# Patient Record
Sex: FEMALE | Race: WHITE | NOT HISPANIC OR LATINO | Employment: OTHER | URBAN - METROPOLITAN AREA
[De-identification: names, ages, dates, MRNs, and addresses within clinical notes are randomized per-mention and may not be internally consistent; named-entity substitution may affect disease eponyms.]

---

## 2018-03-04 ENCOUNTER — HOSPITAL ENCOUNTER (EMERGENCY)
Facility: HOSPITAL | Age: 83
Discharge: HOME | End: 2018-03-04
Attending: EMERGENCY MEDICINE | Admitting: EMERGENCY MEDICINE
Payer: MEDICARE

## 2018-03-04 ENCOUNTER — APPOINTMENT (EMERGENCY)
Dept: RADIOLOGY | Facility: HOSPITAL | Age: 83
End: 2018-03-04
Payer: MEDICARE

## 2018-03-04 VITALS
RESPIRATION RATE: 16 BRPM | TEMPERATURE: 98.7 F | OXYGEN SATURATION: 96 % | HEIGHT: 64 IN | BODY MASS INDEX: 33.29 KG/M2 | DIASTOLIC BLOOD PRESSURE: 70 MMHG | SYSTOLIC BLOOD PRESSURE: 152 MMHG | HEART RATE: 55 BPM | WEIGHT: 195 LBS

## 2018-03-04 DIAGNOSIS — S52.502A CLOSED FRACTURE OF LEFT DISTAL RADIUS: Primary | ICD-10-CM

## 2018-03-04 PROCEDURE — 73110 X-RAY EXAM OF WRIST: CPT

## 2018-03-04 PROCEDURE — 99283 EMERGENCY DEPT VISIT LOW MDM: CPT

## 2018-03-04 NOTE — ED NOTES
Patient given arm sling per Dr Sanchez's order but refused to allow me to put it on due to jacket even after I explained it could be worn with her jacket        Melinda Locus  03/04/18 1124

## 2018-03-04 NOTE — ED PROVIDER NOTES
History  Chief Complaint   Patient presents with   Kayla  Fall     patient states that she fell on thursday  lost her balance and fell foward and landed on her left wrist  denies head injury, neck or back pain  denies LOC      53-year-old female with a history of atrial fibrillation on Coumadin presenting with chief complaint of left wrist injury  She states on Thursday she slipped and fell injuring her left wrist   She lost her balance she did not completely fall to the ground she caught her herself but injured her left wrist in the process  She had immediate pain and swelling localized to the dorsal aspect of her left medial wrist she has had some mild intermittent discomfort since that time however she presents today for evaluation because she still has difficulty picking up objects with that left wrist secondary to the discomfort  The patient's pain is localized to her left distal radial wrist radiates into her immediate hand, is worse when she tries to make a fist and  objects is better with Tylenol and rest she does note some mild swelling and ecchymosis but otherwise denies weakness paresthesias or anesthesia denies elbow pain she denies striking her head or falling completed the ground she denies headache auditory visual or balance complaint weakness paresthesias or anesthesia neck pain chest pain cough shortness of breath abdominal pain other muscle skeletal complaint or injury otherwise denies a complete review systems is negative she does get her INR checked regularly            None       Past Medical History:   Diagnosis Date    Atrial fibrillation ([facility])     Disease of thyroid gland     Hypertension        Past Surgical History:   Procedure Laterality Date    TOTAL THYROIDECTOMY         History reviewed  No pertinent family history  I have reviewed and agree with the history as documented      Social History   Substance Use Topics    Smoking status: Never Smoker    Smokeless tobacco: Never Used    Alcohol use No        Review of Systems   Constitutional: Negative for chills and fever  Eyes: Negative for redness and visual disturbance  Respiratory: Negative for cough and shortness of breath  Cardiovascular: Negative for chest pain and palpitations  Gastrointestinal: Negative for abdominal pain, diarrhea, nausea and vomiting  Genitourinary: Negative for dysuria, frequency and urgency  Musculoskeletal: Positive for joint swelling  Negative for arthralgias, back pain and myalgias  Left wrist pain   Skin: Positive for color change  Negative for rash  Neurological: Negative for dizziness, syncope, weakness, light-headedness and headaches  Hematological: Negative for adenopathy  Does not bruise/bleed easily  Psychiatric/Behavioral: Negative for agitation and behavioral problems  All other systems reviewed and are negative  Physical Exam  ED Triage Vitals [03/04/18 0952]   Temperature Pulse Respirations Blood Pressure SpO2   98 7 °F (37 1 °C) 55 16 152/70 96 %      Temp Source Heart Rate Source Patient Position - Orthostatic VS BP Location FiO2 (%)   Oral Monitor Sitting Right arm --      Pain Score       No Pain           Orthostatic Vital Signs  Vitals:    03/04/18 0952   BP: 152/70   Pulse: 55   Patient Position - Orthostatic VS: Sitting       Physical Exam   Constitutional: She is oriented to person, place, and time  She appears well-developed and well-nourished  No distress  Well-appearing sitting upright conversational no acute distress   HENT:   Head: Normocephalic and atraumatic  Right Ear: External ear normal    Left Ear: External ear normal    Her head is atraumatic   Eyes: EOM are normal  Pupils are equal, round, and reactive to light  Neck: Normal range of motion  Neck supple  No tracheal deviation present  Cardiovascular: Normal rate, regular rhythm and normal heart sounds  Exam reveals no gallop and no friction rub  No murmur heard    Pulmonary/Chest: Effort normal and breath sounds normal  She has no wheezes  She has no rales  Musculoskeletal:   Patient has mild ecchymosis to her left distal radius she is maximally tender left distal radial radius the skin is intact circumferentially though no other bony tenderness of the hand wrist or forearm about tenderness over the elbow muscle strength is 5/5 globally the distal left upper extremity, sensations intact cap refill is brisk no other injuries appreciated on exam   Neurological: She is alert and oriented to person, place, and time  No cranial nerve deficit  She exhibits normal muscle tone  Coordination normal    Skin: Skin is warm and dry  No rash noted  Psychiatric: She has a normal mood and affect  Her behavior is normal    Nursing note and vitals reviewed  ED Medications  Medications - No data to display    Diagnostic Studies  Results Reviewed     None                 XR wrist 3+ views LEFT   Final Result by Cas Bynum MD (03/04 1034)      Impacted mildly displaced intra-articular distal radial metaphysis fracture  The study was marked in [facility] for immediate notification           Workstation performed: [id number]                    Procedures  Procedures       Phone Contacts  ED Phone Contact    ED Course  ED Course as of Mar 04 2257   Mick Vick Mar 04, 2018   1138 Sugar-tong splint applied by technician neurovascularly intact after evaluation by myself understands agrees to discharge return instructions    1140 The patient declined pain medicine on initial evaluation at discharge she has no pain, this is the reason patient was not given pain medicine during this encounter                                MDM  Number of Diagnoses or Management Options  Closed fracture of left distal radius:   Diagnosis management comments: 65-year-old female on Coumadin presenting several days after mechanical fall with outstretched left hand did not completely fall to the ground there was no head strike no loss of consciousness complaining of left wrist pain, persistent since that time the accident she has no headache her head is atraumatic no neck pain or stiffness no weakness paresthesias or anesthesia no other injuries or complaints on exam she has some mild tenderness over her left radial wrist is with some mild ecchymosis otherwise skin is intact circumferentially distal hands neurovascularly intact will get x-ray, will likely require splint orthopedic follow-up pain control, her disposition pending further evaluation reassessment discussion with family    CritCare Time    Disposition  Final diagnoses:   Closed fracture of left distal radius     Time reflects when diagnosis was documented in both MDM as applicable and the Disposition within this note     Time User Action Codes Description Comment    3/4/2018 11:39 AM David Monique Add [[id number]] Closed fracture of left distal radius       ED Disposition     ED Disposition Condition Comment    Discharge  Meliza Brittle discharge to home/self care  Condition at discharge: Good        Follow-up Information     Follow up With Specialties Details Why Contact Info Additional [address] [facility] Emergency Department Emergency Medicine  If symptoms worsen [address]  [phone] [place] ED, [address] [facility], [address] [facility], [place] Orthopedic Surgery In 1 week Please call for an appointment on Monday be seen within 1 week as discussed [address] [facility] [address]  [phone]           There are no discharge medications for this patient  No discharge procedures on file      ED Provider  Electronically Signed by           Sean Meyers DO  03/04/18 1264

## 2018-03-04 NOTE — DISCHARGE INSTRUCTIONS
Please keep the arm in a splint and sling return if he develops severe pain not controlled at home worsening symptoms or other concerning symptoms otherwise your to follow up with the orthopedic surgeon 1 week for re-evaluation and further management as instructed     Arm Fracture in [facility]:   An arm fracture is a crack or break in one or more of the bones in your arm  An arm fracture may be caused by a fall onto an outstretched hand  It may also be caused by trauma from a car accident or a sports injury  Osteoporosis (brittle bones) can increase your risk for a fracture  DISCHARGE INSTRUCTIONS:   Return to the emergency department if:   · The pain in your injured arm does not get better or gets worse, even after you rest and take medicine  · Your injured arm, hand, or fingers feel numb  · Your arm is swollen, red, and feels warm  · Your skin over the arm fracture is swollen, cold, or pale  · You cannot move your arm, hand, or fingers  Contact your healthcare provider if:   · You have a fever  · Your brace or splint becomes wet, damaged, or comes off  · You have questions or concerns about your injury, treatment, or care  Medicines:   · NSAIDs , such as ibuprofen, help decrease swelling and pain  This medicine is available with or without a doctor's order  NSAIDs can cause stomach bleeding or kidney problems in certain people  If you take blood thinner medicine, always ask your healthcare provider if NSAIDs are safe for you  Always read the medicine label and follow directions  · Acetaminophen  decreases pain  It is available without a doctor's order  Ask how much to take and how often to take it  Follow directions  Acetaminophen can cause liver damage if not taken correctly  · Prescription pain medicine  may be given  Ask how to take this medicine safely  · Take your medicine as directed    Contact your healthcare provider if you think your medicine is not helping or if you have side effects  Tell him or her if you are allergic to any medicine  Keep a list of the medicines, vitamins, and herbs you take  Include the amounts, and when and why you take them  Bring the list or the pill bottles to follow-up visits  Carry your medicine list with you in case of an emergency  Follow up with your healthcare provider within 1 week: You may need to see a bone specialist within 3 to 4 days if you need surgery or further treatment for your arm fracture  Write down your questions so you remember to ask them during your visits  Rest:  You should rest your arm as much as possible  Ask your healthcare provider when you can put pressure or weight on your arm  Also ask when you can return to sports or vigorous exercises  Ice:  Apply ice on your arm for 15 to 20 minutes every hour or as directed  Use an ice pack, or put crushed ice in a plastic bag  Cover it with a towel  Ice helps prevent tissue damage and decreases swelling and pain  Elevate:  Elevate your arm above the level of your heart as often as you can  This will help decrease swelling and pain  Prop your arm on pillows or blankets to keep it elevated comfortably  Care for your cast or splint:  Ask your healthcare provider when it is okay to bathe  Do not get your cast or splint wet  Before you take a bath or shower, cover your cast or splint with a plastic bag  Tape the bag to your skin to help keep water out  Hold your arm away from the water in case the bag leaks  · Check the skin around your cast or splint each day for any redness or open skin  · Do not use a sharp or pointed object to scratch your skin under the cast or splint  Physical therapy:  A physical therapist teaches you exercises to help improve movement and strength, and to decrease pain  © 2017 [employer] Information is for End User's use only and may not be sold, redistributed or otherwise used for commercial purposes   All illustrations and images included in CareNotes® are the copyrighted property of A D A M [employer]  or Jean Lewis  The above information is an  only  It is not intended as medical advice for individual conditions or treatments  Talk to your doctor, nurse or pharmacist before following any medical regimen to see if it is safe and effective for you  Wrist Fracture in Adults   WHAT YOU NEED TO KNOW:   A wrist fracture is a break in one or more of the bones in your wrist    DISCHARGE INSTRUCTIONS:   Return to the emergency department if:   · Your pain gets worse or does not get better after you take pain medicine  · Your cast or splint breaks, gets wet, or is damaged  · Your hand or fingers feel numb or cold  · Your hand or fingers turn white or blue  · Your splint or cast feels too tight  · You have more pain or swelling after the cast or splint is put on  Contact your healthcare provider if:   · You have a fever  · There is a foul smell or blood coming from under the cast     · You have questions or concerns about your condition or care  Medicines:   · Prescription pain medicine  may be given  Ask your healthcare provider how to take this medicine safely  Some prescription pain medicines contain acetaminophen  Do not take other medicines that contain acetaminophen without talking to your healthcare provider  Too much acetaminophen may cause liver damage  Prescription pain medicine may cause constipation  Ask your healthcare provider how to prevent or treat constipation  · NSAIDs , such as ibuprofen, help decrease swelling, pain, and fever  NSAIDs can cause stomach bleeding or kidney problems in certain people  If you take blood thinner medicine, always ask your healthcare provider if NSAIDs are safe for you  Always read the medicine label and follow directions  · Acetaminophen  decreases pain and fever  It is available without a doctor's order   Ask how much to take and how often to take it  Follow directions  Read the labels of all other medicines you are using to see if they also contain acetaminophen, or ask your doctor or pharmacist  Acetaminophen can cause liver damage if not taken correctly  Do not use more than 4 grams (4,000 milligrams) total of acetaminophen in one day  · Take your medicine as directed  Contact your healthcare provider if you think your medicine is not helping or if you have side effects  Tell him or her if you are allergic to any medicine  Keep a list of the medicines, vitamins, and herbs you take  Include the amounts, and when and why you take them  Bring the list or the pill bottles to follow-up visits  Carry your medicine list with you in case of an emergency  Self-care:   · Rest  as much as possible  Do not play contact sports until the healthcare provider says it is okay  · Apply ice  on your wrist for 15 to 20 minutes every hour or as directed  Use an ice pack, or put crushed ice in a plastic bag  Cover it with a towel before you place it on your skin  Ice helps prevent tissue damage and decreases swelling and pain  · Elevate  your wrist above the level of your heart as often as possible  This will help decrease swelling and pain  Prop your wrist on pillows or blankets to keep it elevated comfortably  Cast or splint care:   · You may take a bath or shower as directed  Do not let your cast or splint get wet  Before bathing, cover the cast or splint with 2 plastic trash bags  Tape the bags to your skin above the cast or splint to seal out the water  Keep your arm out of the water in case the bag breaks  If a plaster cast gets wet and soft, call your healthcare provider  · Check the skin around the cast or splint every day  You may put lotion on any red or sore areas  · Do not push down or lean on the cast or brace because it may break      · Do not  scratch the skin under the cast by putting a sharp or pointed object inside the cast   Go to physical therapy as directed: You may need physical therapy after your wrist heals and the cast is removed  A physical therapist can teach you exercises to help improve movement and strength and to decrease pain  Follow up with your healthcare provider or bone specialist as directed: You may need to return to have your cast removed  You may also need an x-ray to check how well the bone has healed  Write down your questions so you remember to ask them during your visits  © 2017 [address] [facility] Information is for End User's use only and may not be sold, redistributed or otherwise used for commercial purposes  All illustrations and images included in CareNotes® are the copyrighted property of A D A M , Inc  or Jean Lewis  The above information is an  only  It is not intended as medical advice for individual conditions or treatments  Talk to your doctor, nurse or pharmacist before following any medical regimen to see if it is safe and effective for you

## 2018-03-05 ENCOUNTER — OFFICE VISIT (OUTPATIENT)
Dept: OBGYN CLINIC | Facility: CLINIC | Age: 83
End: 2018-03-05
Payer: MEDICARE

## 2018-03-05 VITALS
HEART RATE: 54 BPM | SYSTOLIC BLOOD PRESSURE: 131 MMHG | HEIGHT: 64 IN | WEIGHT: 186 LBS | BODY MASS INDEX: 31.76 KG/M2 | DIASTOLIC BLOOD PRESSURE: 79 MMHG

## 2018-03-05 DIAGNOSIS — S52.572A OTHER CLOSED INTRA-ARTICULAR FRACTURE OF DISTAL END OF LEFT RADIUS, INITIAL ENCOUNTER: Primary | ICD-10-CM

## 2018-03-05 PROCEDURE — 25600 CLTX DST RDL FX/EPHYS SEP WO: CPT | Performed by: FAMILY MEDICINE

## 2018-03-05 PROCEDURE — 99203 OFFICE O/P NEW LOW 30 MIN: CPT | Performed by: FAMILY MEDICINE

## 2018-03-05 RX ORDER — AMLODIPINE BESYLATE 5 MG/1
5 TABLET ORAL
COMMUNITY
Start: 2017-05-19

## 2018-03-05 RX ORDER — METOPROLOL SUCCINATE 100 MG/1
100 TABLET, EXTENDED RELEASE ORAL
COMMUNITY
Start: 2017-05-19

## 2018-03-05 RX ORDER — FUROSEMIDE 20 MG/1
20 TABLET ORAL DAILY PRN
Status: ON HOLD | COMMUNITY
Start: 2017-05-19 | End: 2018-03-27

## 2018-03-05 RX ORDER — MULTIVIT WITH MINERALS/LUTEIN
TABLET ORAL
Status: ON HOLD | COMMUNITY
Start: 2004-07-13 | End: 2018-03-27

## 2018-03-05 RX ORDER — WARFARIN SODIUM 1 MG/1
1-2 TABLET ORAL
COMMUNITY
Start: 2017-12-01 | End: 2018-03-14 | Stop reason: HOSPADM

## 2018-03-05 RX ORDER — ATORVASTATIN CALCIUM 10 MG/1
10 TABLET, FILM COATED ORAL
COMMUNITY
Start: 2017-05-19

## 2018-03-05 RX ORDER — LEVOTHYROXINE SODIUM 88 UG/1
88 TABLET ORAL
COMMUNITY
Start: 2017-05-19

## 2018-03-05 NOTE — PROGRESS NOTES
Assessment/Plan:  Assessment/Plan   Diagnoses and all orders for this visit:    Other closed intra-articular fracture of distal end of left radius, initial encounter  -     Orthopedic injury treatment    Other orders  -     metoprolol succinate (TOPROL-XL) 100 mg 24 hr tablet; Take 100 mg by mouth  -     levothyroxine 88 mcg tablet; Take 88 mcg by mouth  -     furosemide (LASIX) 20 mg tablet; Take 20 mg by mouth  -     Multiple Vitamins-Minerals (CENTRUM SILVER) tablet; Take by mouth  -     atorvastatin (LIPITOR) 10 mg tablet; Take 10 mg by mouth  -     aspirin 81 MG tablet; Take by mouth  -     amLODIPine (NORVASC) 5 mg tablet; Take 5 mg by mouth  -     warfarin (COUMADIN) 1 mg tablet; Take 1-2 mg by mouth       51-year-old right-hand-dominant female with left distal radius fracture  Discussed with patient physical exam, radiographs, impression, and plan  X-rays noted for mildly displaced impacted fracture of the distal radius with intra-articular extension  I discussed with her treatment option of immobilization in short-arm cast to which she agreed  She is to avoid any heavy lifting or bearing weight on the left upper extremity  She will be placed in short arm cast and she will follow up in 4 weeks at which point we will remove cast and repeat x-rays  Subjective:   Patient ID: Meliza Nguyen is a [de-identified] y o  female  Chief Complaint   Patient presents with    Left Wrist - Pain, Swelling       [de-identified]year old right-hand-dominant female presents for evaluation of left wrist pain from fall injury at home 4 days ago  Patient states that she lost her and fell bracing her fall with her outstretched left hand  She had immediate pain of the left wrist described as localized dorsal aspect, nonradiating, achy, mild in intensity, and associated with swelling  She continued with activity despite the discomfort as she thought it was a sprain and it would get better  She took Tylenol with some mild improvement   She has A-fib and is currently taking Coumadin so she doesn't take NSAIDs  Her pain worsened and ecchymosis developed  She presented to the emergency room yesterday (3 days after date of injury) where she was evaluated and found to have fracture of the distal radius  She was placed in a splint and advised to follow-up with orthopedic care  Since being in the splint she has not had any pain of the wrist       Wrist Pain   This is a new problem  Episode onset: 4 days  The problem occurs constantly  The problem has been unchanged  Associated symptoms include arthralgias and joint swelling  Pertinent negatives include no abdominal pain, chest pain, chills, fever, numbness, rash, sore throat or weakness  Exacerbated by: Wrist movement  She has tried immobilization and acetaminophen for the symptoms  The treatment provided moderate relief  The following portions of the patient's history were reviewed and updated as appropriate: She  has a past medical history of Atrial fibrillation ([address] ); Disease of thyroid gland; and Hypertension  She  has a past surgical history that includes Total thyroidectomy  Her family history is not on file  She  reports that she has never smoked  She has never used smokeless tobacco  She reports that she does not drink alcohol or use drugs  She has No Known Allergies       Review of Systems   Constitutional: Negative for chills and fever  HENT: Negative for sore throat  Eyes: Negative for visual disturbance  Respiratory: Negative for shortness of breath  Cardiovascular: Negative for chest pain  Gastrointestinal: Negative for abdominal pain  Genitourinary: Negative for difficulty urinating  Musculoskeletal: Positive for arthralgias and joint swelling  Skin: Negative for rash and wound  Neurological: Negative for weakness and numbness  Hematological: Does not bruise/bleed easily  Psychiatric/Behavioral: Negative for self-injury         Objective:  Vitals:    03/05/18 1416   BP: 131/79   Pulse: (!) 54   Weight: 84 4 kg (186 lb)   Height: 5' 4" (1 626 m)         Observations     Left Wrist/Hand   Negative for deformity  Additional Observation Details  Left: Mild swelling of the wrist, ecchymosis about the wrist and dorsum of the hand    Palpation     Additional Palpation Details  Left: palpable radial pulse    Tenderness     Left Wrist/Hand   No tenderness in the triangular fibrocartilage complex  and scaphoid  Additional Tenderness Details  Left: Distal radius volar, dorsal, and radial aspects    Active Range of Motion     Additional Active Range of Motion Details  Deferred  Left: normal active range of motion of fingers and thumb    Passive Range of Motion     Additional Passive Range of Motion Details  Deferred    Strength/Myotome Testing     Additional Strength Details  Left: Mild swelling of the wrist, ecchymosis about the wrist and dorsum of the hand      Physical Exam   Constitutional: She is oriented to person, place, and time  She appears well-developed  No distress  HENT:   Head: Normocephalic  Eyes: Conjunctivae are normal    Neck: No tracheal deviation present  Pulmonary/Chest: Effort normal  No respiratory distress  Abdominal: She exhibits no distension  Musculoskeletal:        Left hand: She exhibits no deformity  Neurological: She is alert and oriented to person, place, and time  Skin: Skin is warm and dry  Psychiatric: She has a normal mood and affect  Her behavior is normal        I have personally reviewed pertinent films in PACS and my interpretation is mildly displaced impacted intra-articular fracture of left distal radius  Fracture / Dislocation Treatment  Date/Time: 3/5/2018 2:36 PM  Performed by: Geovanny Duque  Authorized by: Geovanny Duque   Consent: Verbal consent obtained    Risks and benefits: risks, benefits and alternatives were discussed  Consent given by: patient  Patient understanding: patient states understanding of the procedure being performed  Patient consent: the patient's understanding of the procedure matches consent given  Procedure consent: procedure consent matches procedure scheduled  Relevant documents: relevant documents present and verified  Test results: test results available and properly labeled  Site marked: the operative site was marked  Imaging studies: imaging studies available  Required items: required blood products, implants, devices, and special equipment available  Patient identity confirmed: verbally with patient  Time out: Immediately prior to procedure a "time out" was called to verify the correct patient, procedure, equipment, support staff and site/side marked as required    Injury location: wrist  Location details: left wrist  Injury type: fracture  Fracture type: distal radius  Pre-procedure neurovascular assessment: neurovascularly intact  Pre-procedure distal perfusion: normal  Pre-procedure neurological function: normal  Pre-procedure range of motion: reduced    Anesthesia:  Local anesthesia used: no  Manipulation performed: no  Immobilization: cast    Cast type: short armSupplies used: fiberglass  Post-procedure neurovascular assessment: post-procedure neurovascularly intact  Post-procedure distal perfusion: normal  Post-procedure neurological function: normal  Post-procedure range of motion: unchanged  Patient tolerance: Patient tolerated the procedure well with no immediate complications

## 2018-03-05 NOTE — PATIENT INSTRUCTIONS
Arm Fracture in Adults   AMBULATORY CARE:   An arm fracture  is a crack or break in one or more of the bones in your arm  An arm fracture may be caused by a fall onto an outstretched hand  It may also be caused by trauma from a car accident or a sports injury  Osteoporosis (brittle bones) can increase your risk for a fracture  Common signs and symptoms include the following:   · Arm and shoulder pain    · Swelling and bruising    · Abnormal arm position or shape    · Severe pain when you move your arm    · Weakness or numbness in your arm, hand, or fingers  Seek care immediately if:   · The pain in your injured arm does not get better or gets worse, even after you rest and take medicine  · Your injured arm, hand, or fingers feel numb  · Your arm is swollen, red, and feels warm  · Your skin over the arm fracture is swollen, cold, or pale  · You cannot move your arm, hand, or fingers  Contact your healthcare provider if:   · You have a fever  · Your brace or splint becomes wet, damaged, or comes off  · You have questions or concerns about your injury, treatment, or care  Treatment  Treatment will depend on what kind of fracture you have, and how bad it is  You may need any of the following:  · A support device , such as a brace, cast, or splint may be needed to hold your broken bones in place  It will decrease your arm movement and allow it to heal      · NSAIDs , such as ibuprofen, help decrease swelling and pain  This medicine is available with or without a doctor's order  NSAIDs can cause stomach bleeding or kidney problems in certain people  If you take blood thinner medicine, always ask your healthcare provider if NSAIDs are safe for you  Always read the medicine label and follow directions  · Acetaminophen  decreases pain  It is available without a doctor's order  Ask how much to take and how often to take it  Follow directions   Acetaminophen can cause liver damage if not taken correctly  · Prescription pain medicine  may be given  Ask how to take this medicine safely  · Closed reduction  may be done to put your bones back into the correct position without surgery  · Open reduction surgery  may be needed to put your bones back into the correct position  An incision is made and the bones are put back in the correct position  This may include the use of special wires, pins, plates or screws  Manage your symptoms:   · Elevate  your arm above the level of your heart as often as you can  This will help decrease swelling and pain  Prop your arm on pillows or blankets to keep it elevated comfortably  · Rest   You should rest your arm as much as possible  Ask your healthcare provider when you can put pressure or weight on your arm  Also ask when you can return to sports or vigorous exercises  · Apply ice  on your arm for 15 to 20 minutes every hour or as directed  Use an ice pack, or put crushed ice in a plastic bag  Cover it with a towel  Ice helps prevent tissue damage and decreases swelling and pain  · Go to physical therapy as directed  A physical therapist teaches you exercises to help improve movement and strength, and to decrease pain  Follow up with your healthcare provider within 1 week: You may need to see a bone specialist within 3 to 4 days if you need surgery or further treatment for your arm fracture  Write down your questions so you remember to ask them during your visits  © 2017 [employer]  Information is for End User's use only and may not be sold, redistributed or otherwise used for commercial purposes  All illustrations and images included in CareNotes® are the copyrighted property of [employer]  or [employer]  The above information is an  only  It is not intended as medical advice for individual conditions or treatments   Talk to your doctor, nurse or pharmacist before following any medical regimen to see if it is safe and effective for you

## 2018-03-08 ENCOUNTER — APPOINTMENT (INPATIENT)
Dept: RADIOLOGY | Facility: HOSPITAL | Age: 83
DRG: 493 | End: 2018-03-08
Payer: MEDICARE

## 2018-03-08 ENCOUNTER — APPOINTMENT (EMERGENCY)
Dept: RADIOLOGY | Facility: HOSPITAL | Age: 83
DRG: 493 | End: 2018-03-08
Payer: MEDICARE

## 2018-03-08 ENCOUNTER — HOSPITAL ENCOUNTER (INPATIENT)
Facility: HOSPITAL | Age: 83
LOS: 6 days | DRG: 493 | End: 2018-03-14
Attending: EMERGENCY MEDICINE | Admitting: FAMILY MEDICINE
Payer: MEDICARE

## 2018-03-08 DIAGNOSIS — K59.00 CONSTIPATION: ICD-10-CM

## 2018-03-08 DIAGNOSIS — S82.841D CLOSED BIMALLEOLAR FRACTURE OF RIGHT ANKLE WITH ROUTINE HEALING, SUBSEQUENT ENCOUNTER: ICD-10-CM

## 2018-03-08 DIAGNOSIS — S82.841A ANKLE FRACTURE, BIMALLEOLAR, CLOSED, RIGHT, INITIAL ENCOUNTER: Primary | ICD-10-CM

## 2018-03-08 DIAGNOSIS — S52.502A DISTAL RADIUS FRACTURE, LEFT: ICD-10-CM

## 2018-03-08 DIAGNOSIS — R26.2 AMBULATORY DYSFUNCTION: ICD-10-CM

## 2018-03-08 DIAGNOSIS — Z47.89 CAST DISCOMFORT: ICD-10-CM

## 2018-03-08 DIAGNOSIS — I48.20 CHRONIC ATRIAL FIBRILLATION (HCC): ICD-10-CM

## 2018-03-08 PROBLEM — D72.829 LEUCOCYTOSIS: Status: ACTIVE | Noted: 2018-03-08

## 2018-03-08 PROBLEM — I48.91 ATRIAL FIBRILLATION (HCC): Status: ACTIVE | Noted: 2018-03-08

## 2018-03-08 PROBLEM — N18.30 CKD (CHRONIC KIDNEY DISEASE), STAGE III (HCC): Status: ACTIVE | Noted: 2018-03-08

## 2018-03-08 PROBLEM — E78.5 HYPERLIPIDEMIA: Status: ACTIVE | Noted: 2018-03-08

## 2018-03-08 PROBLEM — E03.9 HYPOTHYROIDISM: Status: ACTIVE | Noted: 2018-03-08

## 2018-03-08 PROBLEM — I10 HYPERTENSION: Status: ACTIVE | Noted: 2018-03-08

## 2018-03-08 PROBLEM — E89.0 POST-SURGICAL HYPOTHYROIDISM: Status: ACTIVE | Noted: 2018-03-08

## 2018-03-08 LAB
ANION GAP SERPL CALCULATED.3IONS-SCNC: 9 MMOL/L (ref 4–13)
APTT PPP: 30 SECONDS (ref 23–35)
ATRIAL RATE: 82 BPM
BASOPHILS # BLD AUTO: 0.04 THOUSANDS/ΜL (ref 0–0.1)
BASOPHILS NFR BLD AUTO: 0 % (ref 0–1)
BUN SERPL-MCNC: 17 MG/DL (ref 5–25)
CALCIUM SERPL-MCNC: 9 MG/DL (ref 8.3–10.1)
CHLORIDE SERPL-SCNC: 104 MMOL/L (ref 100–108)
CO2 SERPL-SCNC: 28 MMOL/L (ref 21–32)
CREAT SERPL-MCNC: 1.05 MG/DL (ref 0.6–1.3)
EOSINOPHIL # BLD AUTO: 0.01 THOUSAND/ΜL (ref 0–0.61)
EOSINOPHIL NFR BLD AUTO: 0 % (ref 0–6)
ERYTHROCYTE [DISTWIDTH] IN BLOOD BY AUTOMATED COUNT: 14 % (ref 11.6–15.1)
GFR SERPL CREATININE-BSD FRML MDRD: 50 ML/MIN/1.73SQ M
GLUCOSE SERPL-MCNC: 103 MG/DL (ref 65–140)
HCT VFR BLD AUTO: 40.5 % (ref 34.8–46.1)
HGB BLD-MCNC: 13.1 G/DL (ref 11.5–15.4)
INR PPP: 1.67 (ref 0.86–1.16)
LYMPHOCYTES # BLD AUTO: 0.65 THOUSANDS/ΜL (ref 0.6–4.47)
LYMPHOCYTES NFR BLD AUTO: 6 % (ref 14–44)
MCH RBC QN AUTO: 30.4 PG (ref 26.8–34.3)
MCHC RBC AUTO-ENTMCNC: 32.3 G/DL (ref 31.4–37.4)
MCV RBC AUTO: 94 FL (ref 82–98)
MONOCYTES # BLD AUTO: 0.83 THOUSAND/ΜL (ref 0.17–1.22)
MONOCYTES NFR BLD AUTO: 7 % (ref 4–12)
NEUTROPHILS # BLD AUTO: 9.91 THOUSANDS/ΜL (ref 1.85–7.62)
NEUTS SEG NFR BLD AUTO: 86 % (ref 43–75)
NRBC BLD AUTO-RTO: 0 /100 WBCS
PLATELET # BLD AUTO: 223 THOUSANDS/UL (ref 149–390)
PMV BLD AUTO: 10.2 FL (ref 8.9–12.7)
POTASSIUM SERPL-SCNC: 4 MMOL/L (ref 3.5–5.3)
PROTHROMBIN TIME: 20.2 SECONDS (ref 12.1–14.4)
QRS AXIS: -18 DEGREES
QRSD INTERVAL: 104 MS
QT INTERVAL: 408 MS
QTC INTERVAL: 473 MS
RBC # BLD AUTO: 4.31 MILLION/UL (ref 3.81–5.12)
SODIUM SERPL-SCNC: 141 MMOL/L (ref 136–145)
T WAVE AXIS: 69 DEGREES
VENTRICULAR RATE: 81 BPM
WBC # BLD AUTO: 11.48 THOUSAND/UL (ref 4.31–10.16)

## 2018-03-08 PROCEDURE — 85610 PROTHROMBIN TIME: CPT | Performed by: EMERGENCY MEDICINE

## 2018-03-08 PROCEDURE — 73610 X-RAY EXAM OF ANKLE: CPT

## 2018-03-08 PROCEDURE — 93005 ELECTROCARDIOGRAM TRACING: CPT

## 2018-03-08 PROCEDURE — 85025 COMPLETE CBC W/AUTO DIFF WBC: CPT | Performed by: EMERGENCY MEDICINE

## 2018-03-08 PROCEDURE — 36415 COLL VENOUS BLD VENIPUNCTURE: CPT | Performed by: EMERGENCY MEDICINE

## 2018-03-08 PROCEDURE — 99285 EMERGENCY DEPT VISIT HI MDM: CPT

## 2018-03-08 PROCEDURE — 85730 THROMBOPLASTIN TIME PARTIAL: CPT | Performed by: EMERGENCY MEDICINE

## 2018-03-08 PROCEDURE — 99222 1ST HOSP IP/OBS MODERATE 55: CPT | Performed by: ORTHOPAEDIC SURGERY

## 2018-03-08 PROCEDURE — 72170 X-RAY EXAM OF PELVIS: CPT

## 2018-03-08 PROCEDURE — 73552 X-RAY EXAM OF FEMUR 2/>: CPT

## 2018-03-08 PROCEDURE — 71046 X-RAY EXAM CHEST 2 VIEWS: CPT

## 2018-03-08 PROCEDURE — 80048 BASIC METABOLIC PNL TOTAL CA: CPT | Performed by: EMERGENCY MEDICINE

## 2018-03-08 PROCEDURE — 93010 ELECTROCARDIOGRAM REPORT: CPT | Performed by: INTERNAL MEDICINE

## 2018-03-08 RX ORDER — TRAMADOL HYDROCHLORIDE 50 MG/1
50 TABLET ORAL EVERY 6 HOURS PRN
Status: DISCONTINUED | OUTPATIENT
Start: 2018-03-08 | End: 2018-03-14 | Stop reason: HOSPADM

## 2018-03-08 RX ORDER — ONDANSETRON 2 MG/ML
4 INJECTION INTRAMUSCULAR; INTRAVENOUS EVERY 6 HOURS PRN
Status: DISCONTINUED | OUTPATIENT
Start: 2018-03-08 | End: 2018-03-14 | Stop reason: HOSPADM

## 2018-03-08 RX ORDER — LEVOTHYROXINE SODIUM 88 UG/1
88 TABLET ORAL
Status: DISCONTINUED | OUTPATIENT
Start: 2018-03-09 | End: 2018-03-14 | Stop reason: HOSPADM

## 2018-03-08 RX ORDER — ATORVASTATIN CALCIUM 10 MG/1
10 TABLET, FILM COATED ORAL
Status: DISCONTINUED | OUTPATIENT
Start: 2018-03-08 | End: 2018-03-14 | Stop reason: HOSPADM

## 2018-03-08 RX ORDER — FUROSEMIDE 20 MG/1
20 TABLET ORAL DAILY
Status: DISCONTINUED | OUTPATIENT
Start: 2018-03-08 | End: 2018-03-13

## 2018-03-08 RX ORDER — AMLODIPINE BESYLATE 5 MG/1
5 TABLET ORAL DAILY
Status: DISCONTINUED | OUTPATIENT
Start: 2018-03-08 | End: 2018-03-14 | Stop reason: HOSPADM

## 2018-03-08 RX ORDER — METOPROLOL SUCCINATE 100 MG/1
100 TABLET, EXTENDED RELEASE ORAL DAILY
Status: DISCONTINUED | OUTPATIENT
Start: 2018-03-08 | End: 2018-03-14 | Stop reason: HOSPADM

## 2018-03-08 RX ORDER — ACETAMINOPHEN 325 MG/1
650 TABLET ORAL EVERY 6 HOURS SCHEDULED
Status: DISCONTINUED | OUTPATIENT
Start: 2018-03-08 | End: 2018-03-10

## 2018-03-08 RX ORDER — ACETAMINOPHEN 325 MG/1
650 TABLET ORAL ONCE
Status: COMPLETED | OUTPATIENT
Start: 2018-03-08 | End: 2018-03-08

## 2018-03-08 RX ORDER — MORPHINE SULFATE 2 MG/ML
2 INJECTION, SOLUTION INTRAMUSCULAR; INTRAVENOUS EVERY 4 HOURS PRN
Status: DISCONTINUED | OUTPATIENT
Start: 2018-03-08 | End: 2018-03-14 | Stop reason: HOSPADM

## 2018-03-08 RX ORDER — ASPIRIN 81 MG/1
81 TABLET, CHEWABLE ORAL DAILY
Status: DISCONTINUED | OUTPATIENT
Start: 2018-03-08 | End: 2018-03-14 | Stop reason: HOSPADM

## 2018-03-08 RX ORDER — HEPARIN SODIUM 5000 [USP'U]/ML
5000 INJECTION, SOLUTION INTRAVENOUS; SUBCUTANEOUS EVERY 8 HOURS SCHEDULED
Status: DISPENSED | OUTPATIENT
Start: 2018-03-08 | End: 2018-03-11

## 2018-03-08 RX ORDER — DOCUSATE SODIUM 100 MG/1
100 CAPSULE, LIQUID FILLED ORAL 2 TIMES DAILY
Status: DISCONTINUED | OUTPATIENT
Start: 2018-03-08 | End: 2018-03-14 | Stop reason: HOSPADM

## 2018-03-08 RX ADMIN — ASPIRIN 81 MG 81 MG: 81 TABLET ORAL at 17:05

## 2018-03-08 RX ADMIN — DOCUSATE SODIUM 100 MG: 100 CAPSULE, LIQUID FILLED ORAL at 17:04

## 2018-03-08 RX ADMIN — METOPROLOL SUCCINATE 100 MG: 100 TABLET, EXTENDED RELEASE ORAL at 17:05

## 2018-03-08 RX ADMIN — FUROSEMIDE 20 MG: 20 TABLET ORAL at 17:05

## 2018-03-08 RX ADMIN — ACETAMINOPHEN 650 MG: 325 TABLET, FILM COATED ORAL at 17:04

## 2018-03-08 RX ADMIN — HEPARIN SODIUM 5000 UNITS: 5000 INJECTION, SOLUTION INTRAVENOUS; SUBCUTANEOUS at 23:03

## 2018-03-08 RX ADMIN — ACETAMINOPHEN 650 MG: 325 TABLET, FILM COATED ORAL at 23:03

## 2018-03-08 RX ADMIN — ACETAMINOPHEN 650 MG: 325 TABLET, FILM COATED ORAL at 09:20

## 2018-03-08 RX ADMIN — AMLODIPINE BESYLATE 5 MG: 5 TABLET ORAL at 17:05

## 2018-03-08 NOTE — ASSESSMENT & PLAN NOTE
Discussed with Orthopedics  patient to be scheduled for surgery  on 03/12/18  Continue supportive care, pain medications  Nonweightbearing to right lower extremity    Consult P T /OT

## 2018-03-08 NOTE — ED PROVIDER NOTES
History  Chief Complaint   Patient presents with    Fall     Patient states she fell at home this morning, slipped on ice while taking out the dog  Patient now c/o right ankle pain  Patient states she normally has leg and ankle swelling in her right leg, however today it is more swollen after the fall  Patient is an 68-year-old female with past medical history of atrial fibrillation on Coumadin, hypothyroidism after total thyroidectomy, hypertension, hyperlipidemia, presents to the emergency department by ambulance complaining of right lower extremity pain, particularly in the right ankle after she slipped and fell on ice  Patient states she awoke and went to go take her dog out and slipped on an icy spot on the ground  She fell on her buttocks and banged her right ankle into a flower pot  She reports feeling sore all over from her right upper thigh all the way down to her right ankle  She has subjective tingling and numbness in the entire right ankle and foot  She denies significant pain but has noted slight swelling in the ankle  She denies hitting her head or loss of consciousness  Patient has not been able to weight bear on the right lower extremity since the injury  Other than the right lower extremity pain and soreness she denies any other injuries or complaints  She denies headache, dizziness or near syncope, neck or back pain, recent URI symptoms, cough, chest pain, palpitations, shortness of breath, abdominal pain, nausea, vomiting, recent change in bowel habits, dysuria, frequency, hematuria, flank pain, skin rash or color change, extremity weakness or other focal neurologic deficits    Of note, patient recently fractured her left distal radius and is in a LUE cast         History provided by:  Patient   used: No    Fall   Associated symptoms: no abdominal pain, no back pain, no chest pain, no headaches, no hearing loss, no nausea, no neck pain and no vomiting Prior to Admission Medications   Prescriptions Last Dose Informant Patient Reported? Taking? Multiple Vitamins-Minerals (CENTRUM SILVER) tablet   Yes Yes   Sig: Take by mouth   amLODIPine (NORVASC) 5 mg tablet   Yes Yes   Sig: Take 5 mg by mouth   aspirin 81 MG tablet   Yes Yes   Sig: Take by mouth   atorvastatin (LIPITOR) 10 mg tablet   Yes Yes   Sig: Take 10 mg by mouth   furosemide (LASIX) 20 mg tablet   Yes Yes   Sig: Take 20 mg by mouth   levothyroxine 88 mcg tablet   Yes Yes   Sig: Take 88 mcg by mouth   metoprolol succinate (TOPROL-XL) 100 mg 24 hr tablet   Yes Yes   Sig: Take 100 mg by mouth   warfarin (COUMADIN) 1 mg tablet   Yes Yes   Sig: Take 1-2 mg by mouth      Facility-Administered Medications: None       Past Medical History:   Diagnosis Date    Atrial fibrillation (HCC)     Disease of thyroid gland     Hypertension        Past Surgical History:   Procedure Laterality Date    TOTAL THYROIDECTOMY         History reviewed  No pertinent family history  I have reviewed and agree with the history as documented  Social History   Substance Use Topics    Smoking status: Never Smoker    Smokeless tobacco: Never Used    Alcohol use No        Review of Systems   Constitutional: Negative for chills and fever  HENT: Negative for congestion, ear pain, hearing loss, rhinorrhea, sore throat and tinnitus  Eyes: Negative for photophobia, pain and visual disturbance  Respiratory: Negative for cough, chest tightness, shortness of breath and wheezing  Cardiovascular: Negative for chest pain and palpitations  Gastrointestinal: Negative for abdominal pain, constipation, diarrhea, nausea and vomiting  Genitourinary: Negative for dysuria, flank pain, frequency and hematuria  Musculoskeletal: Positive for joint swelling  Negative for back pain, neck pain and neck stiffness  + Right thigh and ankle pain  Skin: Negative for color change, pallor and rash     Allergic/Immunologic: Negative for immunocompromised state  Neurological: Negative for dizziness, syncope, weakness, light-headedness, numbness and headaches  Hematological: Bruises/bleeds easily  Psychiatric/Behavioral: Negative for confusion and decreased concentration  All other systems reviewed and are negative  Physical Exam  ED Triage Vitals   Temperature Pulse Respirations Blood Pressure SpO2   03/08/18 0810 03/08/18 0810 03/08/18 0810 03/08/18 0810 03/08/18 0812   98 6 °F (37 °C) 99 18 (!) 172/90 90 %      Temp Source Heart Rate Source Patient Position - Orthostatic VS BP Location FiO2 (%)   03/08/18 0810 03/08/18 0810 03/08/18 0810 03/08/18 0810 --   Oral Monitor Lying Right arm       Pain Score       03/08/18 0812       No Pain         Vitals:    03/08/18 0810 03/08/18 0812 03/08/18 0945   BP: (!) 172/90  139/63   BP Location: Right arm     Pulse: 99  81   Resp: 18  18   Temp: 98 6 °F (37 °C)     TempSrc: Oral     SpO2:  90% 90%   Weight: 87 9 kg (193 lb 12 6 oz)     Height: 5' 4" (1 626 m)       Physical Exam   Constitutional: She is oriented to person, place, and time  She appears well-developed and well-nourished  No distress  HENT:   Head: Normocephalic and atraumatic  Right Ear: External ear normal    Left Ear: External ear normal    Mouth/Throat: Oropharynx is clear and moist  No oropharyngeal exudate  Eyes: Conjunctivae and EOM are normal  Pupils are equal, round, and reactive to light  Neck: Normal range of motion  Neck supple  No JVD present  Cardiovascular: Normal rate, regular rhythm, normal heart sounds and intact distal pulses  Exam reveals no gallop and no friction rub  No murmur heard  Pulmonary/Chest: Effort normal and breath sounds normal  No respiratory distress  She has no wheezes  She has no rales  She exhibits no tenderness  Abdominal: Soft  Bowel sounds are normal  She exhibits no distension  There is no tenderness  There is no rebound and no guarding     Musculoskeletal: Normal range of motion  She exhibits no edema or tenderness  No midline cervical, thoracic or lumbar spine tenderness  No step-offs  No significant bilateral paravertebral muscle tenderness or hypertonicity  Pelvis stable to compression and nontender  Mild tenderness over the right lateral thigh  Tenderness over the right medial and lateral malleoli with diffuse right ankle swelling  Decreased range of motion actively and passively right ankle secondary to pain  No ankle joint laxity  No gross motor or sensory deficits in the right lower extremity  2+ DP and PT pulses in the right lower extremity  Left upper extremity is in a cast   Normal capillary refill in the left hand  Sensation and motor of digits intact of the left upper extremity  All other extremities are atraumatic and unremarkable  Lymphadenopathy:     She has no cervical adenopathy  Neurological: She is alert and oriented to person, place, and time  No cranial nerve deficit  No gross motor or sensory deficits  Skin: Skin is warm and dry  No rash noted  She is not diaphoretic  No erythema  No pallor  Psychiatric: She has a normal mood and affect  Her behavior is normal    Nursing note and vitals reviewed  ED Medications  Medications   acetaminophen (TYLENOL) tablet 650 mg (650 mg Oral Given 3/8/18 0920)       Diagnostic Studies  Results Reviewed     Procedure Component Value Units Date/Time    Protime-INR [[id number]]  (Abnormal) Collected:  03/08/18 1006    Lab Status:  Final result Specimen:  Blood from Arm, Right Updated:  03/08/18 1033     Protime 20 2 (H) seconds      INR 1 67 (H)    APTT [[id number]]  (Normal) Collected:  03/08/18 1006    Lab Status:  Final result Specimen:  Blood from Arm, Right Updated:  03/08/18 1033     PTT 30 seconds     Narrative:          Therapeutic Heparin Range = 60-90 seconds    Basic metabolic panel [[id number]] Collected:  03/08/18 1006    Lab Status:  Final result Specimen:  Blood from Arm, Right Updated:  03/08/18 1024     Sodium 141 mmol/L      Potassium 4 0 mmol/L      Chloride 104 mmol/L      CO2 28 mmol/L      Anion Gap 9 mmol/L      BUN 17 mg/dL      Creatinine 1 05 mg/dL      Glucose 103 mg/dL      Calcium 9 0 mg/dL      eGFR 50 ml/min/1 73sq m     Narrative:         National Kidney Disease Education Program recommendations are as follows:  GFR calculation is accurate only with a steady state creatinine  Chronic Kidney disease less than 60 ml/min/1 73 sq  meters  Kidney failure less than 15 ml/min/1 73 sq  meters  CBC and differential [[id number]]  (Abnormal) Collected:  03/08/18 1006    Lab Status:  Final result Specimen:  Blood from Arm, Right Updated:  03/08/18 1013     WBC 11 48 (H) Thousand/uL      RBC 4 31 Million/uL      Hemoglobin 13 1 g/dL      Hematocrit 40 5 %      MCV 94 fL      MCH 30 4 pg      MCHC 32 3 g/dL      RDW 14 0 %      MPV 10 2 fL      Platelets 685 Thousands/uL      nRBC 0 /100 WBCs      Neutrophils Relative 86 (H) %      Lymphocytes Relative 6 (L) %      Monocytes Relative 7 %      Eosinophils Relative 0 %      Basophils Relative 0 %      Neutrophils Absolute 9 91 (H) Thousands/µL      Lymphocytes Absolute 0 65 Thousands/µL      Monocytes Absolute 0 83 Thousand/µL      Eosinophils Absolute 0 01 Thousand/µL      Basophils Absolute 0 04 Thousands/µL                  XR femur 2 views RIGHT   ED Interpretation by Artem Soriano DO (03/08 2310)   No acute fracture      Final Result by Mihaela Pride DO (03/08 0932)      No fracture  Mild osteoarthritis right knee  Workstation performed: [id number]         XR ankle 3+ views RIGHT   Final Result by Mihaela Pride DO (03/08 0930)      Nondisplaced bimalleolar fractures  Workstation performed: [id number]         XR pelvis ap only 1 or 2 vw   Final Result by Mihaela Pride DO (03/08 7112)      No fracture             Workstation performed: [id number]         XR chest pa & lateral    (Results Pending) Procedures  Static Splint Application  Date/Time: 3/8/2018 11:28 AM  Performed by: Ty Pallas by: Tara Acosta     Patient location:  Bedside  Procedure performed by emergency physician: ED attending supervised ED technician who applied the splint  Other Assisting Provider: Yes (comment) (ED tech)    Consent:     Consent obtained:  Verbal    Consent given by:  Patient    Risks discussed:  Discoloration, numbness, pain and swelling    Alternatives discussed:  Referral and delayed treatment  Universal protocol:     Procedure explained and questions answered to patient or proxy's satisfaction: yes      Imaging studies available: yes      Patient identity confirmed:  Verbally with patient  Indication:     Indications: fracture    Pre-procedure details:     Sensation:  Normal    Skin color:  Normal/pink  Procedure details:     Laterality:  Right    Location:  Ankle    Ankle:  R ankle    Splint type: Ankle stirrup    Supplies:  Ortho-Glass  Post-procedure details:     Pain:  Unchanged    Sensation:  Normal    Neurovascular Exam: skin pink, capillary refill <2 sec, normal pulses and skin intact, warm, and dry      Patient tolerance of procedure: Tolerated well, no immediate complications    ECG 12 Lead Documentation  Date/Time: 3/8/2018 1:44 PM  Performed by: Tara Acosta  Authorized by: Tara Acosta     ECG reviewed by me, the ED Provider: yes    Patient location:  ED  Rate:     ECG rate:  81    ECG rate assessment: normal    Rhythm:     Rhythm comment:  Undetermined    Ectopy:     Ectopy: none    QRS:     QRS axis:  Normal    QRS intervals:  Normal  Conduction:     Conduction: normal    ST segments:     ST segments:  Normal  T waves:     T waves: non-specific    Other findings:     Other findings: prolonged qTc interval             Phone Contacts  ED Phone Contact    ED Course  ED Course as of Mar 08 1346   Thu Mar 08, 2018   0932 Reviewed x-ray of ankle and radiology report and I am in agreement with nondisplaced/minimally displaced bimalleolar fracture  Given the significance of this fracture and the fact that patient is elderly with already 1 extremity in a cast, I will admit and consult Orthopedics  Will place in a splint  [address] with orthopedic SOFIA Salgado) who will come down after current OR case to evaluate patient and evaluate her present LUE cast as she is complaining of discomfort from it  MDM  Number of Diagnoses or Management Options  Diagnosis management comments: 61-year-old female presents with right leg and ankle injury status post slip and fall on ice  Will obtain x-rays of the right hip and pelvis, right femur and right ankle  Will give Tylenol for pain  Given that patient is unable to bear weight and is already in the left upper extremity cast, most likely will admit for safety reasons given that she will be unable to use crutches at home  Orthopedics can evaluate patient inpatient and they can set up possibly acute rehab versus physical therapy  Amount and/or Complexity of Data Reviewed  Tests in the radiology section of CPT®: ordered and reviewed  Independent visualization of images, tracings, or specimens: yes      CritCare Time    Disposition  Final diagnoses:    Ankle fracture, bimalleolar, closed, right, initial encounter   Ambulatory dysfunction   Cast discomfort   Distal radius fracture, left     Time reflects when diagnosis was documented in both MDM as applicable and the Disposition within this note     Time User Action Codes Description Comment    3/8/2018  9:51 AM Onnie Colace E Add [R89 624K] Ankle fracture, bimalleolar, closed, right, initial encounter     3/8/2018  9:51 AM Onnie Colace E Add [R26 2] Ambulatory dysfunction     3/8/2018  9:51 AM Onnie Colace E Add [Z47 89] Cast discomfort     3/8/2018  9:52 AM Onnie Colace E Add [S52 502A] Distal radius fracture, left       ED Disposition     ED Disposition Condition Comment    Admit  Case was discussed with Dr Ananth Johnson and the patient's admission status was agreed to be Admission Status: inpatient status to the service of Dr Ananth Johnson   Follow-up Information    None       Patient's Medications   Discharge Prescriptions    No medications on file     No discharge procedures on file      ED Provider  Electronically Signed by           Bayron Strange DO  03/08/18 [facility],   03/08/18 8977

## 2018-03-08 NOTE — ED NOTES
Repositioned pt in bed, rle elevated on 1 pillow  Pt reports comfort       Gasper Frias, VENECIA  03/08/18 4097

## 2018-03-08 NOTE — H&P
H&P- Lodema Left 1935, [de-identified] y o  female MRN: [id number]    Unit/Bed#: ED 24 Encounter: [id number]    Primary Care Provider: Dong Alanis DO   Date and time admitted to hospital: 3/8/2018  8:01 AM        * Bimalleolar fracture of right ankle   Assessment & Plan    Discussed with Orthopedics  patient to be scheduled for surgery  on 03/12/18  Continue supportive care, pain medications  Nonweightbearing to right lower extremity  Consult P T /OT        Hypertension   Assessment & Plan    Blood pressure acceptable  Continue home medications amlodipine, Lasix, metoprolol  Fracture of distal end of left radius   Assessment & Plan    Continue supportive care and pain management  Treatment as per Ortho  Atrial fibrillation (HCC)   Assessment & Plan    Stable  Will hold off Coumadin for now  Recheck INR in the a m  as patient is scheduled for surgery 4 days later, we will hold off on vitamin K for now  Will reassess the INR in the a m  Continue home dose metoprolol for rate control  Post-surgical hypothyroidism   Assessment & Plan    Continue home dose of levothyroxine  Check TSH in the a m  Leucocytosis   Assessment & Plan    Likely reactive  Monitor CBC and temp  CKD (chronic kidney disease), stage III   Assessment & Plan    Monitor creatinine        Hyperlipidemia   Assessment & Plan    Continue statin              VTE Prophylaxis: pt currently anticoagulated  / sequential compression device   Code Status:  Code status explained and discussed with patient in detail patient preferred to be at level 3  POLST: There is no POLST form on file for this patient (pre-hospital)  Discussion with family: with patient and her  at bedside    Anticipated Length of Stay:  Patient will be admitted on an Inpatient basis with an anticipated length of stay of  2 midnights     Justification for Hospital Stay: ankle surgery    Total Time for Visit, including Counseling / Coordination of Care: 45 minutes  Greater than 50% of this total time spent on direct patient counseling and coordination of care  Chief Complaint:   Pain in the right ankle    History of Present Illness:    Chely Alexandre is a [de-identified] y o  female with past medical history of atrial fibrillation on Coumadin, hypothyroidism after total thyroidectomy, hypertension, hyperlipidemia presents to emergency department complaining of right lower extremity pain particularly in the right ankle after she slipped and fell on ice  Patient states she woke up and went to take her dog out and slipped on an icy spot on the ground  She reports feeling sore all over from her right upper thigh down to right ankle  She has subjective tingling and numbness in the entire right ankle and foot  She denies significant pain but has noted  slight swelling in in the ankle  She denies hitting her head or loss of consciousness  She denies headache, dizziness, syncope, neck or back pain, chest pain, shortness of breath, nausea, abdominal pain, vomiting, extremity weakness, or focal neurological deficits  Of note patient recently fractured her left distal radius and is on left upper extremity cast   In the ED patient underwent x-ray of right ankle which showed nondisplaced bimalleolar fracture  Orthopedic evaluated the patient recommended ORIF right ankle on 03/12/2018  Patient to be admitted for medical management and pain control until the surgery  Review of Systems:    Review of Systems   Constitutional: Negative for activity change, appetite change, chills, fatigue and fever  Eyes: Negative for photophobia  Respiratory: Negative for apnea, cough, choking, chest tightness and shortness of breath  Cardiovascular: Negative for chest pain and palpitations  Gastrointestinal: Negative for abdominal pain, blood in stool, diarrhea, nausea and vomiting  Genitourinary: Negative for dysuria     Musculoskeletal: Negative for arthralgias and back pain  Skin: Negative for color change  Neurological: Positive for numbness  Negative for dizziness and weakness  Hematological: Negative for adenopathy  Psychiatric/Behavioral: Negative for agitation  Past Medical and Surgical History:     Past Medical History:   Diagnosis Date    Atrial fibrillation ([address] )     Disease of thyroid gland     Hypertension        Past Surgical History:   Procedure Laterality Date    TOTAL THYROIDECTOMY         Meds/Allergies:    Prior to Admission medications    Medication Sig Start Date End Date Taking? Authorizing Provider   amLODIPine (NORVASC) 5 mg tablet Take 5 mg by mouth 5/19/17  Yes Historical Provider, MD   aspirin 81 MG tablet Take by mouth 7/13/04  Yes Historical Provider, MD   atorvastatin (LIPITOR) 10 mg tablet Take 10 mg by mouth 5/19/17  Yes Historical Provider, MD   furosemide (LASIX) 20 mg tablet Take 20 mg by mouth 5/19/17  Yes Historical Provider, MD   levothyroxine 88 mcg tablet Take 88 mcg by mouth 5/19/17  Yes Historical Provider, MD   metoprolol succinate (TOPROL-XL) 100 mg 24 hr tablet Take 100 mg by mouth 5/19/17  Yes Historical Provider, MD   Multiple Vitamins-Minerals (CENTRUM SILVER) tablet Take by mouth 7/13/04  Yes Historical Provider, MD   warfarin (COUMADIN) 1 mg tablet Take 1-2 mg by mouth 12/1/17  Yes Historical Provider, MD     I have reviewed home medications with a medical source (PCP, Pharmacy, other)  EPIC    Allergies: No Known Allergies    Social History:     Marital Status: /Civil Union   Occupation:   Patient Pre-hospital Living Situation:   Patient Pre-hospital Level of Mobility: independent  Patient Pre-hospital Diet Restrictions:   Substance Use History:   History   Alcohol Use No     History   Smoking Status    Never Smoker   Smokeless Tobacco    Never Used     History   Drug Use No       Family History:    non-contributory    Physical Exam:     Vitals:   Blood Pressure: 139/63 (03/08/18 0945)  Pulse: 81 (03/08/18 0945)  Temperature: 98 6 °F (37 °C) (03/08/18 0810)  Temp Source: Oral (03/08/18 0810)  Respirations: 18 (03/08/18 0945)  Height: 5' 4" (162 6 cm) (03/08/18 0810)  Weight - Scale: 87 9 kg (193 lb 12 6 oz) (03/08/18 0810)  SpO2: 90 % (03/08/18 0945)    Physical Exam   Constitutional: She is oriented to person, place, and time  She appears well-developed and well-nourished  HENT:   Head: Normocephalic and atraumatic  Eyes: EOM are normal  Pupils are equal, round, and reactive to light  Neck: Normal range of motion  Neck supple  Cardiovascular: Normal rate  Irregular rhythm   Pulmonary/Chest: Effort normal and breath sounds normal    Abdominal: Soft  Bowel sounds are normal    Musculoskeletal: She exhibits tenderness  RLE below the knee in splint, skin under the splint appears normal   Tenderness to palpation on the medial and lateral malleoli  DP +2 on both sides   Neurological: She is alert and oriented to person, place, and time  Skin: Skin is warm and dry  Additional Data:     Lab Results: I have personally reviewed pertinent reports  Results from last 7 days  Lab Units 03/08/18  1006   WBC Thousand/uL 11 48*   HEMOGLOBIN g/dL 13 1   HEMATOCRIT % 40 5   PLATELETS Thousands/uL 223   NEUTROS PCT % 86*   LYMPHS PCT % 6*   MONOS PCT % 7   EOS PCT % 0       Results from last 7 days  Lab Units 03/08/18  1006   SODIUM mmol/L 141   POTASSIUM mmol/L 4 0   CHLORIDE mmol/L 104   CO2 mmol/L 28   BUN mg/dL 17   CREATININE mg/dL 1 05   CALCIUM mg/dL 9 0   GLUCOSE RANDOM mg/dL 103       Results from last 7 days  Lab Units 03/08/18  1006   INR  1 67*       Imaging: I have personally reviewed pertinent reports  XR femur 2 views RIGHT   ED Interpretation by Adolfo Lomax DO (03/08 2891)   No acute fracture      Final Result by Yeison Aguillon DO (03/08 0932)      No fracture  Mild osteoarthritis right knee           Workstation performed: [id number]         XR ankle 3+ views RIGHT   Final Result by Monica Najera DO (03/08 0930)      Nondisplaced bimalleolar fractures  Workstation performed: [id number]         XR pelvis ap only 1 or 2 vw   Final Result by Monica Najera DO (03/08 0956)      No fracture  Workstation performed: [id number]         XR chest pa & lateral    (Results Pending)       EKG, Pathology, and Other Studies Reviewed on Admission:   · EKG: pending     Allscripts / Epic Records Reviewed: Yes     ** Please Note: This note has been constructed using a voice recognition system   **

## 2018-03-08 NOTE — CONSULTS
Orthopedics   Kaylin Cadena [de-identified] y o  female MRN: [id number]  Unit/Bed#: MO XRAY      Chief Complaint:   right ankle pain    HPI:  [de-identified] y  o female status post fall complaining of right ankle pain and inability to bear weight  Patient fell injuring right ankle  The patient was brought to the hospital and was admitted  Patient also has a history of left wrist fracture and has been treated with Dr Matty Palumbo with short-arm cast   Left wrist fracture and he was from a fall on March 1, 2018  Patient states she has been having some pain in the left wrist as well from the cast   Leave the cast is too tight  Patient is also complaining of right ankle pain  Denies any numbness or tingling in the lower extremity  Able to move toes  Pain with motion of the ankle  Review Of Systems:   · Skin: Normal  · Neuro: See HPI  · Musculoskeletal: See HPI  · 14 point review of systems negative except as stated above     Past Medical History:   Past Medical History:   Diagnosis Date    Atrial fibrillation ([address] )     Disease of thyroid gland     Hypertension        Past Surgical History:   Past Surgical History:   Procedure Laterality Date    TOTAL THYROIDECTOMY         Family History:  Family history reviewed and non-contributory  History reviewed  No pertinent family history      Social History:  Social History     Social History    Marital status: /Civil Union     Spouse name: N/A    Number of children: N/A    Years of education: N/A     Social History Main Topics    Smoking status: Never Smoker    Smokeless tobacco: Never Used    Alcohol use No    Drug use: No    Sexual activity: Not Asked     Other Topics Concern    None     Social History Narrative    None       Allergies:   No Known Allergies        Labs:    0  Lab Value Date/Time   HCT 40 5 03/08/2018 1006   HGB 13 1 03/08/2018 1006   INR 1 67 (H) 03/08/2018 1006   WBC 11 48 (H) 03/08/2018 1006       Meds:  No current facility-administered medications for this encounter  Current Outpatient Prescriptions:     amLODIPine (NORVASC) 5 mg tablet, Take 5 mg by mouth, Disp: , Rfl:     aspirin 81 MG tablet, Take by mouth, Disp: , Rfl:     atorvastatin (LIPITOR) 10 mg tablet, Take 10 mg by mouth, Disp: , Rfl:     furosemide (LASIX) 20 mg tablet, Take 20 mg by mouth, Disp: , Rfl:     levothyroxine 88 mcg tablet, Take 88 mcg by mouth, Disp: , Rfl:     metoprolol succinate (TOPROL-XL) 100 mg 24 hr tablet, Take 100 mg by mouth, Disp: , Rfl:     Multiple Vitamins-Minerals (CENTRUM SILVER) tablet, Take by mouth, Disp: , Rfl:     warfarin (COUMADIN) 1 mg tablet, Take 1-2 mg by mouth, Disp: , Rfl:     Blood Culture:   No results found for: BLOODCX    Wound Culture:   No results found for: WOUNDCULT    Ins and Outs:  No intake/output data recorded  Physical Exam:   /63   Pulse 81   Temp 98 6 °F (37 °C) (Oral)   Resp 18   Ht 5' 4" (1 626 m)   Wt 87 9 kg (193 lb 12 6 oz)   SpO2 90%   BMI 33 26 kg/m²   Gen: Alert and oriented to person, place, time  HEENT: EOMI, eyes clear, moist mucus membranes, hearing intact  Respiratory: Bilateral chest rise  No audible wheezing found  Cardiovascular: Regular Rate and Rhythm  Abdomen: soft nontender/nondistended  Musculoskeletal: right lower extremity  · Skin intact under the splint  · Tender to palpation over medial and lateral malleoli  · Painful ankle range of motion  · 2+ DP  · Sensation intact L1-S1  · Positive knee flexion/extension, EHL/FHL      Radiology:   I personally reviewed the films  X-rays right ankle shows bimalleolar ankle fracture      _*_*_*_*_*_*_*_*_*_*_*_*_*_*_*_*_*_*_*_*_*_*_*_*_*_*_*_*_*_*_*_*_*_*_*_*_*_*_*_*_*    Assessment:  [de-identified] y  o female status post fall with right ankle fracture  patient on Coumadin for history of AFib  Will need to hold Coumadin  today and will be able to schedule surgery on Monday      Plan:   · NWB right lower extremity in splint  · To OR for ORIF of right ankle fracture on Monday, 3/12/2018  · Hold Coumadin  May use heparin until Sunday night and then hold all anticoagulation after midnight  · Please administer one dose of Vit K   · NPO after midnight Sunday night  · Analgesics for pain  · Elevate right lower extremity   · Informed consent obtained for ORIF right ankle  · Ordered CXR and EKG     · Medicine team for medical management and  for clearance to OR  · Dispo: Ortho will follow        Reji Mazariegos PA-C

## 2018-03-08 NOTE — ED NOTES
Inpatient orthopedics at the bedside, evaluating the patient        Devon Daugherty RN  03/08/18 4073

## 2018-03-08 NOTE — ED NOTES
Orthopedics in and split wrist cast  Presently hard split cast in place wrapped with ace bandage       Yash Abbasi RN  03/08/18 0621

## 2018-03-08 NOTE — PLAN OF CARE
DISCHARGE PLANNING     Discharge to home or other facility with appropriate resources Progressing        DISCHARGE PLANNING - CARE MANAGEMENT     Discharge to post-acute care or home with appropriate resources Progressing        INFECTION - ADULT     Absence or prevention of progression during hospitalization Progressing        Knowledge Deficit     Patient/family/caregiver demonstrates understanding of disease process, treatment plan, medications, and discharge instructions Progressing        Nutrition/Hydration-ADULT     Nutrient/Hydration intake appropriate for improving, restoring or maintaining nutritional needs Progressing        PAIN - ADULT     Verbalizes/displays adequate comfort level or baseline comfort level Progressing        Potential for Falls     Patient will remain free of falls Progressing        SAFETY ADULT     Maintain or return to baseline ADL function Progressing     Maintain or return mobility status to optimal level Progressing

## 2018-03-08 NOTE — ED NOTES
Pt laying in bed comfortably   at bedside  Plan of care reviewed, questions answered, support provided       Gasper Frias RN  03/08/18 8526

## 2018-03-08 NOTE — ASSESSMENT & PLAN NOTE
Stable  Will hold off Coumadin for now  Recheck INR in the a m  as patient is scheduled for surgery 4 days later, we will hold off on vitamin K for now  Will reassess the INR in the a m  Continue home dose metoprolol for rate control

## 2018-03-09 LAB
ANION GAP SERPL CALCULATED.3IONS-SCNC: 9 MMOL/L (ref 4–13)
BUN SERPL-MCNC: 17 MG/DL (ref 5–25)
CALCIUM SERPL-MCNC: 8.6 MG/DL (ref 8.3–10.1)
CHLORIDE SERPL-SCNC: 103 MMOL/L (ref 100–108)
CO2 SERPL-SCNC: 27 MMOL/L (ref 21–32)
CREAT SERPL-MCNC: 1.06 MG/DL (ref 0.6–1.3)
ERYTHROCYTE [DISTWIDTH] IN BLOOD BY AUTOMATED COUNT: 14.1 % (ref 11.6–15.1)
GFR SERPL CREATININE-BSD FRML MDRD: 50 ML/MIN/1.73SQ M
GLUCOSE SERPL-MCNC: 96 MG/DL (ref 65–140)
HCT VFR BLD AUTO: 36.8 % (ref 34.8–46.1)
HGB BLD-MCNC: 12 G/DL (ref 11.5–15.4)
MCH RBC QN AUTO: 30.5 PG (ref 26.8–34.3)
MCHC RBC AUTO-ENTMCNC: 32.6 G/DL (ref 31.4–37.4)
MCV RBC AUTO: 94 FL (ref 82–98)
PLATELET # BLD AUTO: 193 THOUSANDS/UL (ref 149–390)
PMV BLD AUTO: 10.2 FL (ref 8.9–12.7)
POTASSIUM SERPL-SCNC: 3.8 MMOL/L (ref 3.5–5.3)
RBC # BLD AUTO: 3.93 MILLION/UL (ref 3.81–5.12)
SODIUM SERPL-SCNC: 139 MMOL/L (ref 136–145)
TSH SERPL DL<=0.05 MIU/L-ACNC: 3.17 UIU/ML (ref 0.36–3.74)
WBC # BLD AUTO: 6.92 THOUSAND/UL (ref 4.31–10.16)

## 2018-03-09 PROCEDURE — 85027 COMPLETE CBC AUTOMATED: CPT | Performed by: INTERNAL MEDICINE

## 2018-03-09 PROCEDURE — 80048 BASIC METABOLIC PNL TOTAL CA: CPT | Performed by: INTERNAL MEDICINE

## 2018-03-09 PROCEDURE — 84443 ASSAY THYROID STIM HORMONE: CPT | Performed by: INTERNAL MEDICINE

## 2018-03-09 PROCEDURE — 99232 SBSQ HOSP IP/OBS MODERATE 35: CPT | Performed by: INTERNAL MEDICINE

## 2018-03-09 PROCEDURE — 99024 POSTOP FOLLOW-UP VISIT: CPT | Performed by: PHYSICIAN ASSISTANT

## 2018-03-09 RX ADMIN — LEVOTHYROXINE SODIUM 88 MCG: 88 TABLET ORAL at 05:36

## 2018-03-09 RX ADMIN — HEPARIN SODIUM 5000 UNITS: 5000 INJECTION, SOLUTION INTRAVENOUS; SUBCUTANEOUS at 05:36

## 2018-03-09 RX ADMIN — HEPARIN SODIUM 5000 UNITS: 5000 INJECTION, SOLUTION INTRAVENOUS; SUBCUTANEOUS at 21:39

## 2018-03-09 RX ADMIN — HEPARIN SODIUM 5000 UNITS: 5000 INJECTION, SOLUTION INTRAVENOUS; SUBCUTANEOUS at 15:11

## 2018-03-09 RX ADMIN — METOPROLOL SUCCINATE 100 MG: 100 TABLET, EXTENDED RELEASE ORAL at 09:56

## 2018-03-09 RX ADMIN — ACETAMINOPHEN 650 MG: 325 TABLET, FILM COATED ORAL at 15:11

## 2018-03-09 RX ADMIN — Medication 1 TABLET: at 09:56

## 2018-03-09 RX ADMIN — ATORVASTATIN CALCIUM 10 MG: 10 TABLET, FILM COATED ORAL at 17:16

## 2018-03-09 RX ADMIN — AMLODIPINE BESYLATE 5 MG: 5 TABLET ORAL at 09:56

## 2018-03-09 RX ADMIN — ACETAMINOPHEN 650 MG: 325 TABLET, FILM COATED ORAL at 23:34

## 2018-03-09 RX ADMIN — ASPIRIN 81 MG 81 MG: 81 TABLET ORAL at 09:56

## 2018-03-09 RX ADMIN — DOCUSATE SODIUM 100 MG: 100 CAPSULE, LIQUID FILLED ORAL at 09:56

## 2018-03-09 NOTE — CASE MANAGEMENT
Initial Clinical Review    Admission: Date/Time/Statement: 3/8/18 @ 0952     Orders Placed This Encounter   Procedures    Inpatient Admission (expected length of stay for this patient is greater than two midnights)     Standing Status:   Standing     Number of Occurrences:   1     Order Specific Question:   Admitting Physician     Answer:   Deep Yadav [[id number]]     Order Specific Question:   Level of Care     Answer:   Med Surg [[id number]]     Order Specific Question:   Estimated length of stay     Answer:   More than 2 Midnights     Order Specific Question:   Certification     Answer:   I certify that inpatient services are medically necessary for this patient for a duration of greater than two midnights  See H&P and MD Progress Notes for additional information about the patient's course of treatment  ED: Date/Time/Mode of Arrival:   ED Arrival Information     Expected Arrival Acuity Means of Arrival Escorted By Service Admission Type    - 3/8/2018 08:01 [facility] General Medicine Urgent    Arrival Complaint    Ankle injury          Chief Complaint:   Chief Complaint   Patient presents with    Fall     Patient states she fell at home this morning, slipped on ice while taking out the dog  Patient now c/o right ankle pain  Patient states she normally has leg and ankle swelling in her right leg, however today it is more swollen after the fall          History of Illness:   ED Vital Signs:   ED Triage Vitals   Temperature Pulse Respirations Blood Pressure SpO2   03/08/18 0810 03/08/18 0810 03/08/18 0810 03/08/18 0810 03/08/18 0812   98 6 °F (37 °C) 99 18 (!) 172/90 90 %      Temp Source Heart Rate Source Patient Position - Orthostatic VS BP Location FiO2 (%)   03/08/18 0810 03/08/18 0810 03/08/18 0810 03/08/18 0810 --   Oral Monitor Lying Right arm       Pain Score       03/08/18 0812       No Pain        Wt Readings from Last 1 Encounters:   03/08/18 98 4 kg (216 lb 14 9 oz)       Vital Signs (abnormal): WNL    Abnormal Labs/Diagnostic Test Results:   WBC 11 48  INR 1 67    CXR    The cardiac silhouette is enlarged with uncoiled thoracic aorta  The lungs are clear   No pneumothorax or pleural effusion  Compression fracture deformity lower thoracic vertebra, age indeterminate   As per review of the ED provide note, the patient denies back pain  Surgical clips at the thoracic inlet  Impression:       Cardiomegaly   No acute cardiopulmonary disease  Xray right femur  No fracture  Mild osteoarthritis right knee    Right ankle 3 views  Nondisplaced bimalleolar fractures  3 views left wrist  Impacted mildly displaced intra-articular distal radial metaphysis fracture  ED Treatment:   Medication Administration from 03/08/2018 0800 to 03/08/2018 1625       Date/Time Order Dose Route Action Action by Comments     03/08/2018 0920 acetaminophen (TYLENOL) tablet 650 mg 650 mg Oral Given Sunday VENECIA Peters           Past Medical/Surgical History: Active Ambulatory Problems     Diagnosis Date Noted    No Active Ambulatory Problems     Resolved Ambulatory Problems     Diagnosis Date Noted    No Resolved Ambulatory Problems     Past Medical History:   Diagnosis Date    Atrial fibrillation ([facility] [address]     Disease of thyroid gland     Hypertension        Admitting Diagnosis: Ankle injury [S99 919A]  Cast discomfort [Z47 89]  Ankle fracture, bimalleolar, closed, right, initial encounter [S82 841A]  Distal radius fracture, left [S52 502A]  Ambulatory dysfunction [R26 2]    Age/Sex: [de-identified] y o  female    Assessment/Plan:   * Bimalleolar fracture of right ankle   Assessment & Plan     Discussed with Orthopedics  patient to be scheduled for surgery  on 03/12/18  Continue supportive care, pain medications  Nonweightbearing to right lower extremity  Consult P T /OT          Hypertension   Assessment & Plan     Blood pressure acceptable    Continue home medications amlodipine, Lasix, metoprolol           Fracture of distal end of left radius   Assessment & Plan     Continue supportive care and pain management  Treatment as per Ortho           Atrial fibrillation (HCC)   Assessment & Plan     Stable  Will hold off Coumadin for now  Recheck INR in the a m  as patient is scheduled for surgery 4 days later, we will hold off on vitamin K for now  Will reassess the INR in the a m  Continue home dose metoprolol for rate control           Post-surgical hypothyroidism   Assessment & Plan     Continue home dose of levothyroxine  Check TSH in the a m           Leucocytosis   Assessment & Plan     Likely reactive  Monitor CBC and temp           CKD (chronic kidney disease), stage III   Assessment & Plan     Monitor creatinine          Hyperlipidemia   Assessment & Plan     Continue statin             VTE Prophylaxis: pt currently anticoagulated  / sequential compression device      Anticipated Length of Stay:  Patient will be admitted on an Inpatient basis with an anticipated length of stay of  2 midnights     Justification for Hospital Stay: ankle surgery    Admission Orders:  Scheduled Meds:   Current Facility-Administered Medications:  acetaminophen 650 mg Oral Q6H [address] [facility], MD   amLODIPine 5 mg Oral Daily Cresencio Oropeza MD   aspirin 81 mg Oral Daily Cresencio Oropeza MD   atorvastatin 10 mg Oral Daily With Wilian Del Valle MD   docusate sodium 100 mg Oral BID Cresencio Oropeza MD   furosemide 20 mg Oral Daily Cresencio Oropeza MD   heparin (porcine) 5,000 Units Subcutaneous Q8H [facility] Cresencio Oropeza MD   levothyroxine 88 mcg Oral Early Morning Cresencio Oropeza MD   metoprolol succinate 100 mg Oral Daily Cresencio Oropeza MD   morphine injection 2 mg Intravenous Q4H PRN Cresencio Oropeza MD   multivitamin-minerals 1 tablet Oral Daily Cresencio Oropeza MD   ondansetron 4 mg Intravenous Q6H PRN Cresencio Oropeza MD   traMADol 50 mg Oral Q6H PRN Cresencio Oropeza MD     Continuous Infusions:    PRN Meds: morphine injection    ondansetron    traMADol    Cardiac diet  Up with assist  Splint right ankle  VS q 4hours  PT/OT  SCD's Telemetry  Consult Orthopedics  ------------------------------------------------------------------------------------------------------    Orthopedic progress note  [de-identified] y  o female status post fall complaining of right ankle pain and inability to bear weight  Patient fell injuring right ankle  The patient was brought to the hospital and was admitted  Patient also has a history of left wrist fracture and has been treated with Dr Prem Sun with short-arm cast   Left wrist fracture and he was from a fall on March 1, 2018  Patient states she has been having some pain in the left wrist as well from the cast   Leave the cast is too tight  Patient is also complaining of right ankle pain  Denies any numbness or tingling in the lower extremity  Able to move toes  Pain with motion of the ankle    82 y  o female status post fall with right ankle fracture  patient on Coumadin for history of AFib  Will need to hold Coumadin  today and will be able to schedule surgery on Monday      Plan:   · NWB right lower extremity in splint  · To OR for ORIF of right ankle fracture on Monday, 3/12/2018  · Hold Coumadin  May use heparin until Sunday night and then hold all anticoagulation after midnight  · Please administer one dose of Vit K   · NPO after midnight Sunday night  · Analgesics for pain  · Elevate right lower extremity   · Informed consent obtained for ORIF right ankle  · Ordered CXR and EKG     · Medicine team for medical management and  for clearance to OR  Dispo: Ortho will follow

## 2018-03-09 NOTE — PHYSICAL THERAPY NOTE
Physical Therapy Cancellation Note    Pt c acute R ankle fx  Pending OR for repair 3/12/18  Currently NWB R LE + NWB L UE s/p L wrist fx 2* fall  Will hold PT eval at this time + await post op recommendations per ortho  Please advise       Tosha Christine, PT

## 2018-03-09 NOTE — PROGRESS NOTES
Progress Note - Luis Armando De Los Santos 1935, [de-identified] y o  female MRN: [id number]    Unit/Bed#: -01 Encounter: [id number]    Primary Care Provider: Rigoberto Trevino DO   Date and time admitted to hospital: 3/8/2018  8:01 AM        * Bimalleolar fracture of right ankle   Assessment & Plan    Discussed with Orthopedics consult appreciated  patient to be scheduled for surgery  on 03/12/18  Continue supportive care, pain medications  Nonweightbearing to right lower extremity  Consult P T /OT        Hypertension   Assessment & Plan    Blood pressure acceptable  Continue home medications amlodipine, Lasix, metoprolol  Fracture of distal end of left radius   Assessment & Plan    Continue supportive care and pain management  Treatment as per Ortho  Atrial fibrillation (HCC)   Assessment & Plan    Stable  Will hold off Coumadin for now  Recheck INR in the a m  as patient is scheduled for surgery 4 days later, we will hold off on vitamin K for now  Will reassess the INR in the a m  Continue home dose metoprolol for rate control  Post-surgical hypothyroidism   Assessment & Plan    Continue home dose of levothyroxine  TSH normal        Leucocytosis   Assessment & Plan    Likely reactive  Resolved  Monitor CBC and temp  CKD (chronic kidney disease), stage III   Assessment & Plan    Creatinine at baseline, monitor        Hyperlipidemia   Assessment & Plan    Continue statin            VTE Pharmacologic Prophylaxis:   Pharmacologic: Heparin  Mechanical VTE Prophylaxis in Place: Yes    Patient Centered Rounds: I have performed bedside rounds with nursing staff today  Discussions with Specialists or Other Care Team Provider:  None    Education and Discussions with Family / Patient:  Updated patient on management plan  Time Spent for Care: 30 minutes  More than 50% of total time spent on counseling and coordination of care as described above      Current Length of Stay: 1 day(s)    Current Patient Status: Inpatient   Certification Statement: The patient will continue to require additional inpatient hospital stay due to Planned surgery for right ankle    Discharge Plan:  Continued hospitalization    Code Status: Level 3 - DNAR and DNI      Subjective:   Patient seen and examined  Denies any pain on rest   Notes pain in moving right leg  Denies any acute complaints    Objective:     Vitals:   Temp (24hrs), Av 3 °F (36 8 °C), Min:98 °F (36 7 °C), Max:98 6 °F (37 °C)    HR:  [56-83] 60  Resp:  [14-20] 17  BP: (100-151)/(58-82) 132/60  SpO2:  [90 %-97 %] 97 %  Body mass index is 37 24 kg/m²  Input and Output Summary (last 24 hours): Intake/Output Summary (Last 24 hours) at 18 1046  Last data filed at 18 0222   Gross per 24 hour   Intake              240 ml   Output              450 ml   Net             -210 ml       Physical Exam:     Physical Exam   Constitutional: She is oriented to person, place, and time  She appears well-developed and well-nourished  HENT:   Head: Normocephalic and atraumatic  Eyes: EOM are normal  Pupils are equal, round, and reactive to light  Neck: Normal range of motion  Neck supple  Cardiovascular: Normal rate and regular rhythm  Pulmonary/Chest: Effort normal and breath sounds normal    Abdominal: Soft  Bowel sounds are normal    Musculoskeletal: She exhibits tenderness and deformity  ACE bandage wrapped over right lower extremity down from the knee and left upper extremity  Neurological: She is alert and oriented to person, place, and time       Additional Data:     Labs:      Results from last 7 days  Lab Units 18  0539 18  1006   WBC Thousand/uL 6 92 11 48*   HEMOGLOBIN g/dL 12 0 13 1   HEMATOCRIT % 36 8 40 5   PLATELETS Thousands/uL 193 223   NEUTROS PCT %  --  86*   LYMPHS PCT %  --  6*   MONOS PCT %  --  7   EOS PCT %  --  0       Results from last 7 days  Lab Units 18  0539   SODIUM mmol/L 139   POTASSIUM mmol/L 3  8   CHLORIDE mmol/L 103   CO2 mmol/L 27   BUN mg/dL 17   CREATININE mg/dL 1 06   CALCIUM mg/dL 8 6   GLUCOSE RANDOM mg/dL 96       Results from last 7 days  Lab Units 03/08/18  1006   INR  1 67*       * I Have Reviewed All Lab Data Listed Above  * Additional Pertinent Lab Tests Reviewed: Allison 66 Admission Reviewed    Imaging:    Imaging Reports Reviewed Today Include:  X-ray of the ankle  Imaging Personally Reviewed by Myself Includes:      Recent Cultures (last 7 days):           Last 24 Hours Medication List:     Current Facility-Administered Medications:  acetaminophen 650 mg Oral Q6H Carito Padron MD   amLODIPine 5 mg Oral Daily Aspen Patch, MD   aspirin 81 mg Oral Daily Aspen Patch, MD   atorvastatin 10 mg Oral Daily With Carlo Landaverde MD   docusate sodium 100 mg Oral BID Aspen Patch, MD   furosemide 20 mg Oral Daily Aspen Patch, MD   heparin (porcine) 5,000 Units Subcutaneous Q8H [facility] Aspen Friedman MD   levothyroxine 88 mcg Oral Early Morning Aspen Patch, MD   metoprolol succinate 100 mg Oral Daily Aspen Patch, MD   morphine injection 2 mg Intravenous Q4H PRN Aspen Patch, MD   multivitamin-minerals 1 tablet Oral Daily Aspen Patch, MD   ondansetron 4 mg Intravenous Q6H PRN Aspen Patch, MD   traMADol 50 mg Oral Q6H PRN Aspen Friedman, MD        Today, Patient Was Seen By: Aspen Friedman MD    ** Please Note: Dictation voice to text software may have been used in the creation of this document   **

## 2018-03-09 NOTE — SOCIAL WORK
CM met with pt at bedside  Pt lives with her  in a 2 story house, but her bedroom and bathroom are on the first floor  There are 3 YURY  She recently  (3/4) lost her balance and fell and fractured her L wrist   Yesterday, she slipped on her icy deck and fell and fractured her R ankle  She normally uses a cane prn  She has never been in rehab or used OP/PT or [facility] services  She uses [facility] for prescriptions and [facility] for short term meds  She has no problem with her co-pays  Dr Ian Mcknight is her PCP  She denies substance or mental health issues  She does not have an Advanced Directive or POA and does not want info at this time  She works at [employer] [place] [employer] and she does drive  CM discussed discharge needs  Pt has very little family support-her  expects her to wait on him and she is estranged from her son  Pt is open to Acute Rehab with [facility]  CM will place referral and keep pt informed of decision  CM will continue to follow

## 2018-03-09 NOTE — PLAN OF CARE
Problem: DISCHARGE PLANNING - CARE MANAGEMENT  Goal: Discharge to post-acute care or home with appropriate resources  INTERVENTIONS:  - Conduct assessment to determine patient/family and health care team treatment goals, and need for post-acute services based on payer coverage, community resources, and patient preferences, and barriers to discharge  - Address psychosocial, clinical, and financial barriers to discharge as identified in assessment in conjunction with the patient/family and health care team  - Arrange appropriate level of post-acute services according to patients   needs and preference and payer coverage in collaboration with the physician and health care team  - Communicate with and update the patient/family, physician, and health care team regarding progress on the discharge plan  - Arrange appropriate transportation to post-acute venues   Outcome: Progressing  CM met with pt at bedside  Pt lives with her  in a 2 story house, but her bedroom and bathroom are on the first floor  There are 3 YURY  She recently  (3/4) lost her balance and fell and fractured her L wrist   Yesterday, she slipped on her icy deck and fell and fractured her R ankle  She normally uses a cane prn  She has never been in rehab or used OP/PT or [facility] services  She uses [facility] for prescriptions and [facility] for short term meds  She has no problem with her co-pays  Dr Jose M Zuniga is her PCP  She denies substance or mental health issues  She does not have an Advanced Directive or POA and does not want info at this time  She works at [employer] in [place] and she does drive  CM discussed discharge needs  Pt has very little family support-her  expects her to wait on him and she is estranged from her son  Pt is open to Acute Rehab with [facility]  CM will place referral and keep pt informed of decision  CM will continue to follow

## 2018-03-09 NOTE — ASSESSMENT & PLAN NOTE
Discussed with Orthopedics consult appreciated  patient to be scheduled for surgery  on 03/12/18  Continue supportive care, pain medications  Nonweightbearing to right lower extremity    Consult P T /OT

## 2018-03-09 NOTE — OCCUPATIONAL THERAPY NOTE
OT consult received, chart reviewed however cancelling OT eval, Pt awaiting sx on 3/12/18   Will continues to follow once sx is completed for safe d/c recommendation    Dedra Palmer, OT

## 2018-03-09 NOTE — PROGRESS NOTES
Orthopedics   Maria M Moss [de-identified] y o  female MRN: [id number]  Unit/Bed#: -01      SUBJECTIVE:  In bed comfortable in no acute distress pain well controlled  Posterior splint was placed to high on right lower extremity in the emergency room and patient is having discomfort behind the knee from rubbing on the splint material   Other than that right ankle is doing well  Denies any numbness or tingling in the toes  She has been elevating it to reduce swelling  She also has no complaints with regards to her left wrist fracture  Since the wrist cast has been bivalved she has had no pain and is comfortable  Denies any numbness tingling in the fingers      Allergies:   No Known Allergies        Labs:    0  Lab Value Date/Time   HCT 36 8 03/09/2018 0539   HCT 40 5 03/08/2018 1006   HGB 12 0 03/09/2018 0539   HGB 13 1 03/08/2018 1006   INR 1 67 (H) 03/08/2018 1006   WBC 6 92 03/09/2018 0539   WBC 11 48 (H) 03/08/2018 1006       Meds:    Current Facility-Administered Medications:     acetaminophen (TYLENOL) tablet 650 mg, 650 mg, Oral, Q6H [address], True Huerta MD, 650 mg at 03/08/18 2303    amLODIPine (NORVASC) tablet 5 mg, 5 mg, Oral, Daily, Marlon Nicholson MD, 5 mg at 03/09/18 0956    aspirin chewable tablet 81 mg, 81 mg, Oral, Daily, Marlon Nicholson MD, 81 mg at 03/09/18 0956    atorvastatin (LIPITOR) tablet 10 mg, 10 mg, Oral, Daily With Kam Blue MD    docusate sodium (COLACE) capsule 100 mg, 100 mg, Oral, BID, Marlon Nicholson MD, 100 mg at 03/09/18 0956    furosemide (LASIX) tablet 20 mg, 20 mg, Oral, Daily, Marlon Nicholson MD, 20 mg at 03/08/18 1705    heparin (porcine) subcutaneous injection 5,000 Units, 5,000 Units, Subcutaneous, Q8H [address], Marlon Nicholson MD, 5,000 Units at 03/09/18 0536    levothyroxine tablet 88 mcg, 88 mcg, Oral, Early Morning, True Huerta MD, 88 mcg at 03/09/18 0536    metoprolol succinate (TOPROL-XL) 24 hr tablet 100 mg, 100 mg, Oral, Daily, True Ritu Menjivar MD, 100 mg at 03/09/18 0956    morphine injection 2 mg, 2 mg, Intravenous, Q4H PRN, Jason Caruso MD    multivitamin-minerals (CENTRUM) tablet 1 tablet, 1 tablet, Oral, Daily, Jason Caruso MD, 1 tablet at 03/09/18 0956    ondansetron (ZOFRAN) injection 4 mg, 4 mg, Intravenous, Q6H PRN, Jason Caruso MD    traMADol (ULTRAM) tablet 50 mg, 50 mg, Oral, Q6H PRN, Jason Caruso MD    Blood Culture:   No results found for: BLOODCX    Wound Culture:   No results found for: WOUNDCULT    Ins and Outs:  I/O last 24 hours: In: 56 [P O :490]  Out: 450 [Urine:450]        Physical Exam:   /70 (BP Location: Right arm)   Pulse 64   Temp 97 8 °F (36 6 °C) (Oral)   Resp 17   Ht 5' 4" (1 626 m)   Wt 98 4 kg (216 lb 14 9 oz)   SpO2 94%   BMI 37 24 kg/m²   Gen: Alert and oriented to person, place, time    ORTHO EXAM:  Musculoskeletal: right lower extremity  · Splint removed, skin intact with just mild diffuse swelling  No ecchymosis or erythema noted  · Tender to palpation over medial and lateral malleoli  · Painful ankle range of motion  · 2+ DP  · Sensation intact L1-S1  · Positive knee flexion/extension, EHL/FHL      Radiology:   I personally reviewed the films  X-rays right ankle shows bimalleolar ankle fracture      _*_*_*_*_*_*_*_*_*_*_*_*_*_*_*_*_*_*_*_*_*_*_*_*_*_*_*_*_*_*_*_*_*_*_*_*_*_*_*_*_*    Assessment:  [de-identified] y  o female status post fall with right ankle fracture  Patient with left distal radius fracture from prior fall in a short-arm car cast which has been bivalved and wrapped in Ace wrap  Plan:   · NWB right lower extremity in splint  · Nonweightbearing right upper extremity in short-arm cast  · To OR for ORIF of right ankle fracture on Monday, 3/12/2018  · Hold Coumadin  May use heparin until Sunday night and then hold all anticoagulation after midnight  · Please administer one dose of Vit K    · NPO after midnight Sunday night     · Analgesics for pain  · Elevate right lower extremity to reduce swelling   · Informed consent obtained for ORIF right ankle done  · Medicine team for medical management  · Dispo: Ortho will follow        Consolidated ASHLEY Jane

## 2018-03-10 PROBLEM — K59.00 CONSTIPATION: Status: ACTIVE | Noted: 2018-03-10

## 2018-03-10 LAB
INR PPP: 1.77 (ref 0.86–1.16)
PROTHROMBIN TIME: 21.1 SECONDS (ref 12.1–14.4)

## 2018-03-10 PROCEDURE — 85610 PROTHROMBIN TIME: CPT | Performed by: INTERNAL MEDICINE

## 2018-03-10 PROCEDURE — 99232 SBSQ HOSP IP/OBS MODERATE 35: CPT | Performed by: INTERNAL MEDICINE

## 2018-03-10 RX ORDER — PHYTONADIONE 5 MG/1
2.5 TABLET ORAL ONCE
Status: COMPLETED | OUTPATIENT
Start: 2018-03-10 | End: 2018-03-10

## 2018-03-10 RX ORDER — ACETAMINOPHEN 325 MG/1
650 TABLET ORAL EVERY 6 HOURS PRN
Status: DISCONTINUED | OUTPATIENT
Start: 2018-03-10 | End: 2018-03-14 | Stop reason: HOSPADM

## 2018-03-10 RX ORDER — SENNOSIDES 8.6 MG
1 TABLET ORAL 2 TIMES DAILY
Status: DISCONTINUED | OUTPATIENT
Start: 2018-03-10 | End: 2018-03-14 | Stop reason: HOSPADM

## 2018-03-10 RX ADMIN — ACETAMINOPHEN 650 MG: 325 TABLET, FILM COATED ORAL at 05:54

## 2018-03-10 RX ADMIN — HEPARIN SODIUM 5000 UNITS: 5000 INJECTION, SOLUTION INTRAVENOUS; SUBCUTANEOUS at 15:56

## 2018-03-10 RX ADMIN — AMLODIPINE BESYLATE 5 MG: 5 TABLET ORAL at 08:04

## 2018-03-10 RX ADMIN — SENNOSIDES 8.6 MG: 8.6 TABLET, FILM COATED ORAL at 08:12

## 2018-03-10 RX ADMIN — Medication 1 TABLET: at 08:04

## 2018-03-10 RX ADMIN — METOPROLOL SUCCINATE 100 MG: 100 TABLET, EXTENDED RELEASE ORAL at 08:04

## 2018-03-10 RX ADMIN — HEPARIN SODIUM 5000 UNITS: 5000 INJECTION, SOLUTION INTRAVENOUS; SUBCUTANEOUS at 21:44

## 2018-03-10 RX ADMIN — DOCUSATE SODIUM 100 MG: 100 CAPSULE, LIQUID FILLED ORAL at 08:04

## 2018-03-10 RX ADMIN — SENNOSIDES 8.6 MG: 8.6 TABLET, FILM COATED ORAL at 17:33

## 2018-03-10 RX ADMIN — LEVOTHYROXINE SODIUM 88 MCG: 88 TABLET ORAL at 05:54

## 2018-03-10 RX ADMIN — DOCUSATE SODIUM 100 MG: 100 CAPSULE, LIQUID FILLED ORAL at 17:33

## 2018-03-10 RX ADMIN — ATORVASTATIN CALCIUM 10 MG: 10 TABLET, FILM COATED ORAL at 15:55

## 2018-03-10 RX ADMIN — ASPIRIN 81 MG 81 MG: 81 TABLET ORAL at 08:04

## 2018-03-10 RX ADMIN — PHYTONADIONE 2.5 MG: 5 TABLET ORAL at 08:10

## 2018-03-10 RX ADMIN — HEPARIN SODIUM 5000 UNITS: 5000 INJECTION, SOLUTION INTRAVENOUS; SUBCUTANEOUS at 05:54

## 2018-03-10 RX ADMIN — ACETAMINOPHEN 650 MG: 325 TABLET, FILM COATED ORAL at 21:43

## 2018-03-10 NOTE — ASSESSMENT & PLAN NOTE
Orthopedics consult appreciated  patient to be scheduled for surgery  on 03/12/18  Continue supportive care, pain medications    Nonweightbearing to right lower extremity   - P T /OT

## 2018-03-10 NOTE — PHYSICIAN ADVISOR
Current patient class: Inpatient  The patient is currently on Hospital Day: 2      The patient was admitted to the hospital at 4215 on 3/8/18 for the following diagnosis:  Ankle injury [S99 919A]  Cast discomfort [Z47 89]  Ankle fracture, bimalleolar, closed, right, initial encounter [S82 841A]  Distal radius fracture, left [S52 502A]  Ambulatory dysfunction [R26 2]       There is documentation in the medical record of an expected length of stay of at least 2 midnights  The patient is therefore expected to satisfy the 2 midnight benchmark and given the 2 midnight presumption is appropriate for INPATIENT ADMISSION  Given this expectation of a satisfying stay, CMS instructs us that the patient is most often appropriate for inpatient admission under part A provided medical necessity is documented in the chart  After review of the relevant documentation, labs, vital signs and test results, the patient is appropriate for INPATIENT ADMISSION  Admission to the hospital as an inpatient is a complex decision making process which requires the practitioner to consider the patients presenting complaint, history and physical examination and all relevant testing  With this in mind, in this case, the patient was deemed appropriate for INPATIENT ADMISSION  After review of the documentation and testing available at the time of the admission I concur with this clinical determination of medical necessity  Rationale is as follows: The patient is a [de-identified] yrs old Female who presented to the ED at 3/8/2018  8:01 AM with a chief complaint of Fall (Patient states she fell at home this morning, slipped on ice while taking out the dog  Patient now c/o right ankle pain    Patient states she normally has leg and ankle swelling in her right leg, however today it is more swollen after the fall  )    The patients vitals on arrival were ED Triage Vitals   Temperature Pulse Respirations Blood Pressure SpO2   03/08/18 0810 03/08/18 9961 03/08/18 0810 03/08/18 0810 03/08/18 0812   98 6 °F (37 °C) 99 18 (!) 172/90 90 %      Temp Source Heart Rate Source Patient Position - Orthostatic VS BP Location FiO2 (%)   03/08/18 0810 03/08/18 0810 03/08/18 0810 03/08/18 0810 --   Oral Monitor Lying Right arm       Pain Score       03/08/18 0812       No Pain           Past Medical History:   Diagnosis Date    Atrial fibrillation ([address] )     Disease of thyroid gland     Hypertension      Past Surgical History:   Procedure Laterality Date    TOTAL THYROIDECTOMY             Consults have been placed to:   IP CONSULT TO ORTHOPEDIC SURGERY  IP CONSULT TO CASE MANAGEMENT    Vitals:    03/09/18 0956 03/09/18 1100 03/09/18 1459 03/09/18 1905   BP: 132/60 133/70 118/63 121/75   BP Location:  Right arm Right arm Right arm   Pulse: 60 64 97 97   Resp:  17 16 16   Temp:  97 8 °F (36 6 °C) 97 8 °F (36 6 °C) 97 8 °F (36 6 °C)   TempSrc:  Oral Oral Oral   SpO2:  94% 90% 92%   Weight:       Height:           Most recent labs:    Recent Labs      03/08/18   1006  03/09/18   0539   WBC  11 48*  6 92   HGB  13 1  12 0   HCT  40 5  36 8   PLT  223  193   K  4 0  3 8   NA  141  139   CALCIUM  9 0  8 6   BUN  17  17   CREATININE  1 05  1 06   INR  1 67*   --        Scheduled Meds:  Current Facility-Administered Medications:  acetaminophen 650 mg Oral Q6H [facility] True Huerta MD   amLODIPine 5 mg Oral Daily True Huerta MD   aspirin 81 mg Oral Daily Darius Serrano MD   atorvastatin 10 mg Oral Daily With Marcela Zhang MD   docusate sodium 100 mg Oral BID Darius Serrano MD   furosemide 20 mg Oral Daily Darius Serrano MD   heparin (porcine) 5,000 Units Subcutaneous Q8H [facility] Nino Rodriguez PA-C   levothyroxine 88 mcg Oral Early Morning Darius Serrano MD   metoprolol succinate 100 mg Oral Daily Darius Serrano MD   morphine injection 2 mg Intravenous Q4H PRN Darius Serrano MD   multivitamin-minerals 1 tablet Oral Daily Darius Serrano MD   ondansetron 4 mg Intravenous Q6H PRN Tien Campbell MD   traMADol 50 mg Oral Q6H PRN Tien Campbell MD     Continuous Infusions:   PRN Meds: morphine injection    ondansetron    traMADol    Surgical procedures (if appropriate):  Procedure(s):  OPEN REDUCTION W/ INTERNAL FIXATION (ORIF) ANKLE  Open reduction internal fixation lateral malleolus with possible fixation of medial malleolus

## 2018-03-10 NOTE — ASSESSMENT & PLAN NOTE
Patient did not have bowel movement for last 3 days  She is refusing stool softeners for the fear of going to the bathroom in the bed pan  Patient counseled on importance of bowel movement before surgery  Will add milk of magnesium and senna

## 2018-03-10 NOTE — PLAN OF CARE
INFECTION - ADULT     Absence or prevention of progression during hospitalization Progressing        Knowledge Deficit     Patient/family/caregiver demonstrates understanding of disease process, treatment plan, medications, and discharge instructions Progressing        Nutrition/Hydration-ADULT     Nutrient/Hydration intake appropriate for improving, restoring or maintaining nutritional needs Progressing        PAIN - ADULT     Verbalizes/displays adequate comfort level or baseline comfort level Progressing        Potential for Falls     Patient will remain free of falls Progressing        Prexisting or High Potential for Compromised Skin Integrity     Skin integrity is maintained or improved Progressing        SAFETY ADULT     Maintain or return to baseline ADL function Progressing     Maintain or return mobility status to optimal level Progressing

## 2018-03-10 NOTE — PROGRESS NOTES
Progress Note - Minal Bush 1935, [de-identified] y o  female MRN: [id number]    Unit/Bed#: -01 Encounter: [id number]    Primary Care Provider: Merly Chatterjee DO   Date and time admitted to hospital: 3/8/2018  8:01 AM        * Bimalleolar fracture of right ankle   Assessment & Plan     Orthopedics consult appreciated  patient to be scheduled for surgery  on 03/12/18  Continue supportive care, pain medications  Nonweightbearing to right lower extremity   - P T /OT        Hypertension   Assessment & Plan    Blood pressure acceptable  Continue home medications amlodipine, Lasix, metoprolol  Fracture of distal end of left radius   Assessment & Plan    Continue supportive care and pain management  Treatment as per Ortho  Atrial fibrillation (HCC)   Assessment & Plan    Stable  Will hold off Coumadin for now  INR 1 77 today, will give 1 dose of oral vitamin K, 2 5 mg today, check INR tomorrow  Continue home dose metoprolol for rate control  Post-surgical hypothyroidism   Assessment & Plan    Continue home dose of levothyroxine  TSH normal        Constipation   Assessment & Plan    Patient did not have bowel movement for last 3 days  She is refusing stool softeners for the fear of going to the bathroom in the bed pan  Patient counseled on importance of bowel movement before surgery  Will add milk of magnesium and senna  Leucocytosis   Assessment & Plan    Likely reactive  Resolved  Monitor CBC and temp  CKD (chronic kidney disease), stage III   Assessment & Plan    Creatinine at baseline, monitor        Hyperlipidemia   Assessment & Plan    Continue statin            VTE Pharmacologic Prophylaxis:   Pharmacologic: Heparin  Mechanical VTE Prophylaxis in Place: Yes    Patient Centered Rounds: I have performed bedside rounds with nursing staff today      Discussions with Specialists or Other Care Team Provider:  Reviewed orthopedic note    Education and Discussions with Family / Patient:  Discussed management plan with the patient  When offered to the patient to call any family member for update, patient refused saying that has been will be here later today and she can provide update  Time Spent for Care: 20 minutes  More than 50% of total time spent on counseling and coordination of care as described above  Current Length of Stay: 2 day(s)    Current Patient Status: Inpatient   Certification Statement: The patient will continue to require additional inpatient hospital stay due to Up coming ankle surgery on 2018    Discharge Plan:  Continued hospitalization for pain management, Coumadin management  Code Status: Level 3 - DNAR and DNI      Subjective:   Patient seen and examined denies any acute pain  She did not have any bowel movement for last 3 days  Denies abdominal pain, nausea, vomiting  Objective:     Vitals:   Temp (24hrs), Av 1 °F (36 7 °C), Min:97 8 °F (36 6 °C), Max:98 8 °F (37 1 °C)    HR:  [60-97] 61  Resp:  [16-18] 18  BP: (118-139)/(60-75) 139/73  SpO2:  [90 %-95 %] 91 %  Body mass index is 37 24 kg/m²  Input and Output Summary (last 24 hours): Intake/Output Summary (Last 24 hours) at 03/10/18 4471  Last data filed at 18 1912   Gross per 24 hour   Intake              460 ml   Output              800 ml   Net             -340 ml       Physical Exam:     Physical Exam   Constitutional: She is oriented to person, place, and time  She appears well-developed and well-nourished  HENT:   Head: Normocephalic and atraumatic  Eyes: EOM are normal  Pupils are equal, round, and reactive to light  Neck: Normal range of motion  Neck supple  Cardiovascular: Normal rate and regular rhythm  Pulmonary/Chest: Effort normal and breath sounds normal    Abdominal: Soft  Bowel sounds are normal    Musculoskeletal: She exhibits tenderness     ACE bandage wrapped over right lower extremity down the knee and left upper extremity Neurological: She is alert and oriented to person, place, and time  Skin: Skin is warm and dry  Additional Data:     Labs:      Results from last 7 days  Lab Units 03/09/18  0539 03/08/18  1006   WBC Thousand/uL 6 92 11 48*   HEMOGLOBIN g/dL 12 0 13 1   HEMATOCRIT % 36 8 40 5   PLATELETS Thousands/uL 193 223   NEUTROS PCT %  --  86*   LYMPHS PCT %  --  6*   MONOS PCT %  --  7   EOS PCT %  --  0       Results from last 7 days  Lab Units 03/09/18  0539   SODIUM mmol/L 139   POTASSIUM mmol/L 3 8   CHLORIDE mmol/L 103   CO2 mmol/L 27   BUN mg/dL 17   CREATININE mg/dL 1 06   CALCIUM mg/dL 8 6   GLUCOSE RANDOM mg/dL 96       Results from last 7 days  Lab Units 03/10/18  0443   INR  1 77*       * I Have Reviewed All Lab Data Listed Above  * Additional Pertinent Lab Tests Reviewed:  Allison Hernandez Admission Reviewed    Imaging:    Imaging Reports Reviewed Today Include:  X-ray of right ankle  Imaging Personally Reviewed by Myself Includes:    Recent Cultures (last 7 days):           Last 24 Hours Medication List:     Current Facility-Administered Medications:  acetaminophen 650 mg Oral Q6H PRN Jason Caruso MD   amLODIPine 5 mg Oral Daily Jason Caruso MD   aspirin 81 mg Oral Daily Jason Caruso MD   atorvastatin 10 mg Oral Daily With Froilan Black MD   docusate sodium 100 mg Oral BID Jason Caruso MD   furosemide 20 mg Oral Daily Jason Caruso MD   heparin (porcine) 5,000 Units Subcutaneous Q8H [facility]   levothyroxine 88 mcg Oral Early Morning Jason Caruso MD   magnesium hydroxide 30 mL Oral BID PRN Jason Caruso MD   metoprolol succinate 100 mg Oral Daily Jason Caruso MD   morphine injection 2 mg Intravenous Q4H PRN Jason Caruso MD   multivitamin-minerals 1 tablet Oral Daily Jason Caruso MD   ondansetron 4 mg Intravenous Q6H PRN Jason Caruso MD   phytonadione 2 5 mg Oral Once Jason Caruso MD   senna 1 tablet Oral BID Syamala Simone Beltran MD   traMADol 50 mg Oral Q6H PRN Diane Sharp MD        Today, Patient Was Seen By: Diane Sharp MD    ** Please Note: Dictation voice to text software may have been used in the creation of this document   **

## 2018-03-10 NOTE — ASSESSMENT & PLAN NOTE
Stable  Will hold off Coumadin for now  INR 1 77 today, will give 1 dose of oral vitamin K, 2 5 mg today, check INR tomorrow  Continue home dose metoprolol for rate control

## 2018-03-11 ENCOUNTER — ANESTHESIA EVENT (INPATIENT)
Dept: PERIOP | Facility: HOSPITAL | Age: 83
DRG: 493 | End: 2018-03-11
Payer: MEDICARE

## 2018-03-11 PROBLEM — G47.34 NOCTURNAL HYPOXIA: Status: ACTIVE | Noted: 2018-03-11

## 2018-03-11 LAB
INR PPP: 1.23 (ref 0.86–1.16)
PROTHROMBIN TIME: 15.8 SECONDS (ref 12.1–14.4)

## 2018-03-11 PROCEDURE — 85610 PROTHROMBIN TIME: CPT | Performed by: INTERNAL MEDICINE

## 2018-03-11 PROCEDURE — 99232 SBSQ HOSP IP/OBS MODERATE 35: CPT | Performed by: INTERNAL MEDICINE

## 2018-03-11 RX ORDER — SODIUM CHLORIDE 9 MG/ML
75 INJECTION, SOLUTION INTRAVENOUS CONTINUOUS
Status: DISCONTINUED | OUTPATIENT
Start: 2018-03-12 | End: 2018-03-13

## 2018-03-11 RX ADMIN — FUROSEMIDE 20 MG: 20 TABLET ORAL at 09:00

## 2018-03-11 RX ADMIN — DOCUSATE SODIUM 100 MG: 100 CAPSULE, LIQUID FILLED ORAL at 09:00

## 2018-03-11 RX ADMIN — HEPARIN SODIUM 5000 UNITS: 5000 INJECTION, SOLUTION INTRAVENOUS; SUBCUTANEOUS at 05:17

## 2018-03-11 RX ADMIN — AMLODIPINE BESYLATE 5 MG: 5 TABLET ORAL at 09:00

## 2018-03-11 RX ADMIN — ACETAMINOPHEN 650 MG: 325 TABLET, FILM COATED ORAL at 15:39

## 2018-03-11 RX ADMIN — ASPIRIN 81 MG 81 MG: 81 TABLET ORAL at 09:00

## 2018-03-11 RX ADMIN — ATORVASTATIN CALCIUM 10 MG: 10 TABLET, FILM COATED ORAL at 15:35

## 2018-03-11 RX ADMIN — LEVOTHYROXINE SODIUM 88 MCG: 88 TABLET ORAL at 05:17

## 2018-03-11 RX ADMIN — HEPARIN SODIUM 5000 UNITS: 5000 INJECTION, SOLUTION INTRAVENOUS; SUBCUTANEOUS at 21:17

## 2018-03-11 RX ADMIN — Medication 1 TABLET: at 09:00

## 2018-03-11 RX ADMIN — HEPARIN SODIUM 5000 UNITS: 5000 INJECTION, SOLUTION INTRAVENOUS; SUBCUTANEOUS at 15:34

## 2018-03-11 RX ADMIN — ACETAMINOPHEN 650 MG: 325 TABLET, FILM COATED ORAL at 23:59

## 2018-03-11 RX ADMIN — METOPROLOL SUCCINATE 100 MG: 100 TABLET, EXTENDED RELEASE ORAL at 09:00

## 2018-03-11 NOTE — ASSESSMENT & PLAN NOTE
Patient had 1 episode of oxygen sats dropping to 84%  during sleep which spontaneously resolved  May have a component of sleep apnea  Will need outpatient evaluation after discharge   Continue to monitor

## 2018-03-11 NOTE — PROGRESS NOTES
Progress Note - Suzi Abraham 1935, [de-identified] y o  female MRN: [id number]    Unit/Bed#: -01 Encounter: [id number]    Primary Care Provider: Josy Paul DO   Date and time admitted to hospital: 3/8/2018  8:01 AM        * Bimalleolar fracture of right ankle   Assessment & Plan     Orthopedics consult appreciated  patient to be scheduled for surgery  on 03/12/18  Continue supportive care, pain medications  Nonweightbearing to right lower extremity  -NPO after midnight   - P T /OT        Nocturnal hypoxia   Assessment & Plan    Patient had 1 episode of oxygen sats dropping to 84%  during sleep which spontaneously resolved  May have a component of sleep apnea  Will need outpatient evaluation after discharge  Continue to monitor        Hypertension   Assessment & Plan    Blood pressure acceptable  Continue home medications amlodipine, Lasix, metoprolol  Fracture of distal end of left radius   Assessment & Plan    Continue supportive care and pain management  Treatment as per Ortho  Atrial fibrillation (HCC)   Assessment & Plan    Stable  Coumadin held  Patient received 1 dose of oral vitamin K 2 5 mg on 3/10/18, INR down to 1 2  Continue home dose metoprolol for rate control  Post-surgical hypothyroidism   Assessment & Plan    Continue home dose of levothyroxine  TSH normal        Constipation   Assessment & Plan    Resolved  Continue bowel regimen p r n  CKD (chronic kidney disease), stage III   Assessment & Plan    Creatinine at baseline, monitor            VTE Pharmacologic Prophylaxis:   Pharmacologic: Heparin  Mechanical VTE Prophylaxis in Place: Yes    Patient Centered Rounds: I have performed bedside rounds with nursing staff today  Discussions with Specialists or Other Care Team Provider:  Reviewed our tunnel    Education and Discussions with Family / Patient:  Informed patient about management plan    Time Spent for Care: 20 minutes    More than 50% of total time spent on counseling and coordination of care as described above  Current Length of Stay: 3 day(s)    Current Patient Status: Inpatient   Certification Statement: The patient will continue to require additional inpatient hospital stay due to Patient is scheduled for ankle surgery on 18    Discharge Plan:  Continued hospitalization plan for upcoming surgery tomorrow    Code Status: Level 3 - DNAR and DNI      Subjective:   Patient seen and examined  Patient feels better today  She had bowel movement last night as well as this morning  Able to tolerate diet well  Denies any other acute complaints  As per nursing staff her oxygen sats dropped to 84% during sleep last night which spontaneously improved  Objective:     Vitals:   Temp (24hrs), Av 5 °F (36 9 °C), Min:97 8 °F (36 6 °C), Max:99 °F (37 2 °C)    HR:  [55-68] 68  Resp:  [18] 18  BP: (108-134)/(60-73) 134/68  SpO2:  [92 %-96 %] 95 %  Body mass index is 37 24 kg/m²  Input and Output Summary (last 24 hours): Intake/Output Summary (Last 24 hours) at 18 0954  Last data filed at 18 0725   Gross per 24 hour   Intake              671 ml   Output              300 ml   Net              371 ml       Physical Exam:     Physical Exam   Constitutional: She is oriented to person, place, and time  She appears well-developed and well-nourished  Eyes: EOM are normal  Pupils are equal, round, and reactive to light  Neck: Normal range of motion  Cardiovascular: Normal rate and regular rhythm  Pulmonary/Chest: Effort normal and breath sounds normal    Abdominal: Soft  Bowel sounds are normal    Musculoskeletal:   ACE bandage wrapped over right lower extremity below-the-knee  ACE bandage wrapped over left upper extremity   Neurological: She is alert and oriented to person, place, and time  Patient able to wiggle the toes, sensation appears normal   Skin: Skin is warm           Additional Data:     Labs:      Results from last 7 days  Lab Units 03/09/18  0539 03/08/18  1006   WBC Thousand/uL 6 92 11 48*   HEMOGLOBIN g/dL 12 0 13 1   HEMATOCRIT % 36 8 40 5   PLATELETS Thousands/uL 193 223   NEUTROS PCT %  --  86*   LYMPHS PCT %  --  6*   MONOS PCT %  --  7   EOS PCT %  --  0       Results from last 7 days  Lab Units 03/09/18  0539   SODIUM mmol/L 139   POTASSIUM mmol/L 3 8   CHLORIDE mmol/L 103   CO2 mmol/L 27   BUN mg/dL 17   CREATININE mg/dL 1 06   CALCIUM mg/dL 8 6   GLUCOSE RANDOM mg/dL 96       Results from last 7 days  Lab Units 03/11/18  0433   INR  1 23*       * I Have Reviewed All Lab Data Listed Above  * Additional Pertinent Lab Tests Reviewed:  Allison 66 Admission Reviewed    Imaging:    Imaging Reports Reviewed Today Include:   Imaging Personally Reviewed by Myself Includes:   X-ray of ankle    Recent Cultures (last 7 days):           Last 24 Hours Medication List:     Current Facility-Administered Medications:  acetaminophen 650 mg Oral Q6H PRN Mandie De MD   amLODIPine 5 mg Oral Daily Mandie De MD   aspirin 81 mg Oral Daily Mandie De MD   atorvastatin 10 mg Oral Daily With Mindy Jhaveri MD   docusate sodium 100 mg Oral BID Mandie De MD   furosemide 20 mg Oral Daily Mandie De MD   heparin (porcine) 5,000 Units Subcutaneous Q8H [address] Nino Rodriguez PA-C   levothyroxine 88 mcg Oral Early Morning Mandie De MD   magnesium hydroxide 30 mL Oral BID PRN Mandie De MD   metoprolol succinate 100 mg Oral Daily Mandie De MD   morphine injection 2 mg Intravenous Q4H PRN Mandie De MD   multivitamin-minerals 1 tablet Oral Daily Mandie De MD   ondansetron 4 mg Intravenous Q6H PRN Mandie De MD   senna 1 tablet Oral BID Mandie De MD   [START ON 3/12/2018] sodium chloride 75 mL/hr Intravenous Continuous True Huerta MD   traMADol 50 mg Oral Q6H PRN Mandie De MD        Today, Patient Was Seen By: Mandie De MD    ** Please Note: Dictation voice to text software may have been used in the creation of this document   **

## 2018-03-11 NOTE — ASSESSMENT & PLAN NOTE
Stable  Coumadin held  Patient received 1 dose of oral vitamin K 2 5 mg on 3/10/18, INR down to 1 2  Continue home dose metoprolol for rate control

## 2018-03-11 NOTE — ASSESSMENT & PLAN NOTE
Orthopedics consult appreciated  patient to be scheduled for surgery  on 03/12/18  Continue supportive care, pain medications  Nonweightbearing to right lower extremity    -NPO after midnight   - P T /OT

## 2018-03-11 NOTE — PROGRESS NOTES
Patient Spo2 decreased to 84% while asleep  Elevates 92-94% while awake  Patient denies discomfort or SOB  2L o2 administered and patient SPO2 increased to 96% on 2 L O2  Slim PA made aware

## 2018-03-12 ENCOUNTER — APPOINTMENT (INPATIENT)
Dept: RADIOLOGY | Facility: HOSPITAL | Age: 83
DRG: 493 | End: 2018-03-12
Payer: MEDICARE

## 2018-03-12 ENCOUNTER — ANESTHESIA (INPATIENT)
Dept: PERIOP | Facility: HOSPITAL | Age: 83
DRG: 493 | End: 2018-03-12
Payer: MEDICARE

## 2018-03-12 PROCEDURE — C1713 ANCHOR/SCREW BN/BN,TIS/BN: HCPCS | Performed by: ORTHOPAEDIC SURGERY

## 2018-03-12 PROCEDURE — 0QSJ04Z REPOSITION RIGHT FIBULA WITH INTERNAL FIXATION DEVICE, OPEN APPROACH: ICD-10-PCS | Performed by: ORTHOPAEDIC SURGERY

## 2018-03-12 PROCEDURE — 27814 TREATMENT OF ANKLE FRACTURE: CPT | Performed by: ORTHOPAEDIC SURGERY

## 2018-03-12 PROCEDURE — 73610 X-RAY EXAM OF ANKLE: CPT

## 2018-03-12 PROCEDURE — 99232 SBSQ HOSP IP/OBS MODERATE 35: CPT | Performed by: INTERNAL MEDICINE

## 2018-03-12 DEVICE — 4.0MM CANNULATED SCREW LONG THREAD/50MM: Type: IMPLANTABLE DEVICE | Site: ANKLE | Status: FUNCTIONAL

## 2018-03-12 DEVICE — 3.5MM CORTEX SCREW SELF-TAPPING 14MM: Type: IMPLANTABLE DEVICE | Site: ANKLE | Status: FUNCTIONAL

## 2018-03-12 DEVICE — LCP ONE-THIRD TUBULAR PLATE WITH COLLAR 6 HOLES/69MM
Type: IMPLANTABLE DEVICE | Site: ANKLE | Status: FUNCTIONAL
Brand: LCP

## 2018-03-12 DEVICE — 3.5MM LOCKING SCREW SLF-TPNG W/STARDRIVE(TM) RECESS 14MM: Type: IMPLANTABLE DEVICE | Site: ANKLE | Status: FUNCTIONAL

## 2018-03-12 DEVICE — 1.25MM THREADED GUIDE WIRE 150MM: Type: IMPLANTABLE DEVICE | Site: ANKLE | Status: FUNCTIONAL

## 2018-03-12 DEVICE — 3.5MM CORTEX SCREW SELF-TAPPING 24MM: Type: IMPLANTABLE DEVICE | Site: ANKLE | Status: FUNCTIONAL

## 2018-03-12 DEVICE — 3.5MM LOCKING SCREW SLF-TPNG W/STARDRIVE(TM) RECESS 16MM: Type: IMPLANTABLE DEVICE | Site: ANKLE | Status: FUNCTIONAL

## 2018-03-12 RX ORDER — WARFARIN SODIUM 1 MG/1
1 TABLET ORAL
Status: DISCONTINUED | OUTPATIENT
Start: 2018-03-12 | End: 2018-03-14

## 2018-03-12 RX ORDER — SUCCINYLCHOLINE CHLORIDE 20 MG/ML
INJECTION INTRAMUSCULAR; INTRAVENOUS AS NEEDED
Status: DISCONTINUED | OUTPATIENT
Start: 2018-03-12 | End: 2018-03-12 | Stop reason: SURG

## 2018-03-12 RX ORDER — ROCURONIUM BROMIDE 10 MG/ML
INJECTION, SOLUTION INTRAVENOUS AS NEEDED
Status: DISCONTINUED | OUTPATIENT
Start: 2018-03-12 | End: 2018-03-12 | Stop reason: SURG

## 2018-03-12 RX ORDER — ONDANSETRON 2 MG/ML
4 INJECTION INTRAMUSCULAR; INTRAVENOUS ONCE
Status: DISCONTINUED | OUTPATIENT
Start: 2018-03-12 | End: 2018-03-12 | Stop reason: HOSPADM

## 2018-03-12 RX ORDER — LIDOCAINE HYDROCHLORIDE 10 MG/ML
INJECTION, SOLUTION INFILTRATION; PERINEURAL AS NEEDED
Status: DISCONTINUED | OUTPATIENT
Start: 2018-03-12 | End: 2018-03-12 | Stop reason: SURG

## 2018-03-12 RX ORDER — FENTANYL CITRATE 50 UG/ML
INJECTION, SOLUTION INTRAMUSCULAR; INTRAVENOUS AS NEEDED
Status: DISCONTINUED | OUTPATIENT
Start: 2018-03-12 | End: 2018-03-12 | Stop reason: SURG

## 2018-03-12 RX ORDER — DEXMEDETOMIDINE HYDROCHLORIDE 100 UG/ML
INJECTION, SOLUTION INTRAVENOUS AS NEEDED
Status: DISCONTINUED | OUTPATIENT
Start: 2018-03-12 | End: 2018-03-12 | Stop reason: SURG

## 2018-03-12 RX ORDER — FENTANYL CITRATE/PF 50 MCG/ML
25 SYRINGE (ML) INJECTION
Status: DISCONTINUED | OUTPATIENT
Start: 2018-03-12 | End: 2018-03-12 | Stop reason: HOSPADM

## 2018-03-12 RX ORDER — PROPOFOL 10 MG/ML
INJECTION, EMULSION INTRAVENOUS AS NEEDED
Status: DISCONTINUED | OUTPATIENT
Start: 2018-03-12 | End: 2018-03-12 | Stop reason: SURG

## 2018-03-12 RX ORDER — GLYCOPYRROLATE 0.2 MG/ML
INJECTION INTRAMUSCULAR; INTRAVENOUS AS NEEDED
Status: DISCONTINUED | OUTPATIENT
Start: 2018-03-12 | End: 2018-03-12 | Stop reason: SURG

## 2018-03-12 RX ORDER — MAGNESIUM HYDROXIDE 1200 MG/15ML
LIQUID ORAL AS NEEDED
Status: DISCONTINUED | OUTPATIENT
Start: 2018-03-12 | End: 2018-03-12 | Stop reason: HOSPADM

## 2018-03-12 RX ADMIN — PROPOFOL 150 MG: 10 INJECTION, EMULSION INTRAVENOUS at 12:38

## 2018-03-12 RX ADMIN — ASPIRIN 81 MG 81 MG: 81 TABLET ORAL at 15:27

## 2018-03-12 RX ADMIN — AMLODIPINE BESYLATE 5 MG: 5 TABLET ORAL at 15:28

## 2018-03-12 RX ADMIN — FENTANYL CITRATE 25 MCG: 50 INJECTION INTRAMUSCULAR; INTRAVENOUS at 14:36

## 2018-03-12 RX ADMIN — NEOSTIGMINE METHYLSULFATE 3 MG: 1 INJECTION, SOLUTION INTRAMUSCULAR; INTRAVENOUS; SUBCUTANEOUS at 13:45

## 2018-03-12 RX ADMIN — SODIUM CHLORIDE: 0.9 INJECTION, SOLUTION INTRAVENOUS at 12:20

## 2018-03-12 RX ADMIN — FENTANYL CITRATE 50 MCG: 50 INJECTION, SOLUTION INTRAMUSCULAR; INTRAVENOUS at 12:38

## 2018-03-12 RX ADMIN — Medication 1 TABLET: at 15:27

## 2018-03-12 RX ADMIN — FUROSEMIDE 20 MG: 20 TABLET ORAL at 15:25

## 2018-03-12 RX ADMIN — WARFARIN SODIUM 1 MG: 1 TABLET ORAL at 17:21

## 2018-03-12 RX ADMIN — SODIUM CHLORIDE 75 ML/HR: 0.9 INJECTION, SOLUTION INTRAVENOUS at 05:14

## 2018-03-12 RX ADMIN — GLYCOPYRROLATE 0.4 MG: 0.2 INJECTION, SOLUTION INTRAMUSCULAR; INTRAVENOUS at 13:45

## 2018-03-12 RX ADMIN — ROCURONIUM BROMIDE 10 MG: 10 INJECTION INTRAVENOUS at 13:15

## 2018-03-12 RX ADMIN — SUCCINYLCHOLINE CHLORIDE 100 MG: 20 INJECTION, SOLUTION INTRAMUSCULAR; INTRAVENOUS at 12:38

## 2018-03-12 RX ADMIN — SODIUM CHLORIDE 75 ML/HR: 0.9 INJECTION, SOLUTION INTRAVENOUS at 15:24

## 2018-03-12 RX ADMIN — ROCURONIUM BROMIDE 10 MG: 10 INJECTION INTRAVENOUS at 12:52

## 2018-03-12 RX ADMIN — CEFAZOLIN SODIUM 1000 MG: 1 SOLUTION INTRAVENOUS at 22:34

## 2018-03-12 RX ADMIN — DEXAMETHASONE SODIUM PHOSPHATE 8 MG: 10 INJECTION INTRAMUSCULAR; INTRAVENOUS at 12:45

## 2018-03-12 RX ADMIN — METOPROLOL SUCCINATE 100 MG: 100 TABLET, EXTENDED RELEASE ORAL at 15:28

## 2018-03-12 RX ADMIN — CEFAZOLIN SODIUM 1000 MG: 1 SOLUTION INTRAVENOUS at 16:19

## 2018-03-12 RX ADMIN — DEXMEDETOMIDINE 12 MCG: 100 INJECTION, SOLUTION, CONCENTRATE INTRAVENOUS at 13:35

## 2018-03-12 RX ADMIN — SENNOSIDES 8.6 MG: 8.6 TABLET, FILM COATED ORAL at 15:26

## 2018-03-12 RX ADMIN — CEFAZOLIN SODIUM 2000 MG: 1 SOLUTION INTRAVENOUS at 12:26

## 2018-03-12 RX ADMIN — DOCUSATE SODIUM 100 MG: 100 CAPSULE, LIQUID FILLED ORAL at 15:28

## 2018-03-12 RX ADMIN — FENTANYL CITRATE 25 MCG: 50 INJECTION INTRAMUSCULAR; INTRAVENOUS at 14:31

## 2018-03-12 RX ADMIN — ATORVASTATIN CALCIUM 10 MG: 10 TABLET, FILM COATED ORAL at 17:21

## 2018-03-12 RX ADMIN — FENTANYL CITRATE 50 MCG: 50 INJECTION, SOLUTION INTRAMUSCULAR; INTRAVENOUS at 12:24

## 2018-03-12 RX ADMIN — LEVOTHYROXINE SODIUM 88 MCG: 88 TABLET ORAL at 05:14

## 2018-03-12 RX ADMIN — LIDOCAINE HYDROCHLORIDE 50 MG: 10 INJECTION, SOLUTION INFILTRATION; PERINEURAL at 12:38

## 2018-03-12 NOTE — ANESTHESIA PREPROCEDURE EVALUATION
Review of Systems/Medical History    Chart reviewed      Cardiovascular  EKG reviewed, Hyperlipidemia, Hypertension , Dysrhythmias, atrial fibrillation,   Comment: Undetermined rhythm  Nonspecific T wave abnormality  Prolonged QT  Abnormal ECG  No previous ECGs available  Confirmed by Linh Kiser ([id number]) on 3/8/2018 ,  Pulmonary    Comment: Nocturnal hypoxia     GI/Hepatic      Comment: constipation     Chronic kidney disease stage 3,        Endo/Other  History of thyroid disease , hypothyroidism,      GYN  Negative gynecology ROS          Hematology      Comment: leucocytosis Musculoskeletal    Comment: Bimalleolar fracture Right ankle      Neurology  Negative neurology ROS      Psychology   Negative psychology ROS            Pt offers a hx of difficult a/w at time of thyroidectomy for goiter  Likely related to goiter  No clear markers of diff a/w  Will proceed w/ GA and ETT  Results for Evin Horner (MRN [id number]) as of 3/12/2018 09:09   Ref  Range 3/9/2018 05:39   Sodium Latest Ref Range: 136 - 145 mmol/L 139   Potassium Latest Ref Range: 3 5 - 5 3 mmol/L 3 8   Chloride Latest Ref Range: 100 - 108 mmol/L 103   CO2 Latest Ref Range: 21 - 32 mmol/L 27   Anion Gap Latest Ref Range: 4 - 13 mmol/L 9   BUN Latest Ref Range: 5 - 25 mg/dL 17   Creatinine Latest Ref Range: 0 60 - 1 30 mg/dL 1 06   Glucose Latest Ref Range: 65 - 140 mg/dL 96   Calcium Latest Ref Range: 8 3 - 10 1 mg/dL 8 6   eGFR Latest Units: ml/min/1 73sq m 50   WBC Latest Ref Range: 4 31 - 10 16 Thousand/uL 6 92   RBC Latest Ref Range: 3 81 - 5 12 Million/uL 3 93   Hemoglobin Latest Ref Range: 11 5 - 15 4 g/dL 12 0   Hematocrit Latest Ref Range: 34 8 - 46 1 % 36 8            Anesthesia Plan  ASA Score- 3     Anesthesia Type- general with ASA Monitors  Additional Monitors:   Airway Plan: LMA  Comment: LMA v  ETT  Plan Factors-Patient not instructed to abstain from smoking on day of procedure   Patient did not smoke on day of surgery  Induction- intravenous  Postoperative Plan- Plan for postoperative opioid use  Planned trial extubation    Informed Consent- Anesthetic plan and risks discussed with patient  Pt seen and examined  All labs and diagnostic data personally reviewed  I agree with documentation in the pre-operative assessment performed by the CRNA

## 2018-03-12 NOTE — ANESTHESIA POSTPROCEDURE EVALUATION
Post-Op Assessment Note      CV Status:  Stable    Mental Status:  Alert and awake    Hydration Status:  Euvolemic    PONV Controlled:  Controlled    Airway Patency:  Patent    Post Op Vitals Reviewed: Yes          Staff: CRNA           BP  160/72   Temp   97 7   Pulse  69   Resp   16   SpO2   100

## 2018-03-12 NOTE — PLAN OF CARE
Problem: DISCHARGE PLANNING - CARE MANAGEMENT  Goal: Discharge to post-acute care or home with appropriate resources  INTERVENTIONS:  - Conduct assessment to determine patient/family and health care team treatment goals, and need for post-acute services based on payer coverage, community resources, and patient preferences, and barriers to discharge  - Address psychosocial, clinical, and financial barriers to discharge as identified in assessment in conjunction with the patient/family and health care team  - Arrange appropriate level of post-acute services according to patients   needs and preference and payer coverage in collaboration with the physician and health care team  - Communicate with and update the patient/family, physician, and health care team regarding progress on the discharge plan  - Arrange appropriate transportation to post-acute venues    Outcome: Progressing  Cm spoke to Alanna from [facility]  regarding admission  Alanna will eval after surgery that is being done today and let CM know if she meets the criteria

## 2018-03-12 NOTE — PHYSICAL THERAPY NOTE
Physical Therapy Cancellation Note    PT order received  Chart review performed  At this time, PT evaluation cancelled as pt to OR today for ORIF R ankle  PT will follow and evaluate as appropriate      Anita Granados, PT, DPT

## 2018-03-12 NOTE — PLAN OF CARE
Problem: DISCHARGE PLANNING - CARE MANAGEMENT  Goal: Discharge to post-acute care or home with appropriate resources  INTERVENTIONS:  - Conduct assessment to determine patient/family and health care team treatment goals, and need for post-acute services based on payer coverage, community resources, and patient preferences, and barriers to discharge  - Address psychosocial, clinical, and financial barriers to discharge as identified in assessment in conjunction with the patient/family and health care team  - Arrange appropriate level of post-acute services according to patients   needs and preference and payer coverage in collaboration with the physician and health care team  - Communicate with and update the patient/family, physician, and health care team regarding progress on the discharge plan  - Arrange appropriate transportation to post-acute venues    Outcome: Progressing  Cm spoke to Alanna from [address] [place] [address] regarding admission  Alanna will eval after surgery that is being done today and let CM know if she meets the criteria

## 2018-03-12 NOTE — ASSESSMENT & PLAN NOTE
Orthopedics consult appreciated  patient to be scheduled for surgery today  Continue supportive care, pain medications    Nonweightbearing to right lower extremity   - P T /OT

## 2018-03-12 NOTE — CASE MANAGEMENT
Continued Stay Review    Date: 3/12    Vital Signs: /74 (BP Location: Left arm)   Pulse 63   Temp 98 5 °F (36 9 °C)   Resp 18   Ht 5' 4" (1 626 m)   Wt 98 4 kg (216 lb 14 9 oz)   SpO2 90%   BMI 37 24 kg/m²     Medications:   Scheduled Meds:   Current Facility-Administered Medications:  acetaminophen 650 mg Oral Q6H PRN Mercedes Angulo MD    amLODIPine 5 mg Oral Daily Mercedes Angulo MD    aspirin 81 mg Oral Daily Mercedes Angulo MD    atorvastatin 10 mg Oral Daily With Loretta Brasher MD    cefazolin 1,000 mg Intravenous Q8H Tabatha Wiley MD Last Rate: 1,000 mg (03/12/18 1619)   docusate sodium 100 mg Oral BID Mercedes Angulo MD    furosemide 20 mg Oral Daily Mercedes Angulo MD    levothyroxine 88 mcg Oral Early Morning Mercedes Angulo MD    magnesium hydroxide 30 mL Oral BID PRN Mercedes Angulo MD    metoprolol succinate 100 mg Oral Daily Mercedes Angulo MD    morphine injection 2 mg Intravenous Q4H PRN Mercedes Angulo MD    multivitamin-minerals 1 tablet Oral Daily Mercedes Angulo MD    ondansetron 4 mg Intravenous Q6H PRN Mercedes Angulo MD    senna 1 tablet Oral BID Mercedes Angulo MD    sodium chloride 75 mL/hr Intravenous Continuous Mercedes Angulo MD Last Rate: 75 mL/hr (03/12/18 1524)   traMADol 50 mg Oral Q6H PRN Mercedes Angulo MD    warfarin 1 mg Oral Daily (warfarin) Tabatha Wiley MD      Continuous Infusions:   sodium chloride 75 mL/hr Last Rate: 75 mL/hr (03/12/18 1524)     PRN Meds:   acetaminophen    magnesium hydroxide    morphine injection    ondansetron    traMADol    Abnormal Labs/Diagnostic Results:     INR 3/11 1 23    Age/Sex: [de-identified] y o  female     Assessment/Plan:   * Bimalleolar fracture of right ankle   Assessment & Plan      Orthopedics consult appreciated  patient to be scheduled for surgery today  Continue supportive care, pain medications    Nonweightbearing to right lower extremity   - P T /OT          Nocturnal hypoxia   Assessment & Plan     Patient had 1 episode of oxygen sats dropping to 84%  during sleep which spontaneously resolved  May have a component of sleep apnea  Will need outpatient evaluation after discharge  Continue to monitor          Hypertension   Assessment & Plan     Blood pressure acceptable  Continue home medications amlodipine, Lasix, metoprolol           Atrial fibrillation (HCC)   Assessment & Plan     Stable  Coumadin held  Patient received 1 dose of oral vitamin K 2 5 mg on 3/10/18, INR down to 1 2  Continue home dose metoprolol for rate control           Constipation   Assessment & Plan     Resolved  Continue bowel regimen p r n           CKD (chronic kidney disease), stage III   Assessment & Plan     Creatinine at baseline, monitor          Hyperlipidemia   Assessment & Plan     Continue statin                VTE Pharmacologic Prophylaxis:   Pharmacologic: Heparin  Mechanical VTE Prophylaxis in Place: Yes     Current Length of Stay: 4 day(s)     Current Patient Status: Inpatient   Certification Statement: The patient will continue to require additional inpatient hospital stay due to Patient is scheduled for ankle surgery today     Discharge Plan:  Continue hospitalization, PT OT evaluation after surgery    Follow up with case management for placement     Discharge Plan: TBD    ---------------------------------------------------------------------------------------------------------------------------------  Interim Op Note 3/12  Surgery Date: 3/12/2018     Preop Diagnosis:  Ankle fracture, bimalleolar, closed, right, initial encounter [S82 841A]     Post-Op Diagnosis Codes:     * Ankle fracture, bimalleolar, closed, right, initial encounter [P07 296B]     Procedure(s) (LRB):  OPEN REDUCTION W/ INTERNAL FIXATION (ORIF) ANKLE  Open reduction internal fixation lateral malleolus with possible fixation of medial malleolus (Right)     Surgeon(s) and Role:     * Praful Villanueva MD - Primary     Specimens:  * No specimens in log *     Estimated Blood Loss:   Minimal     Anesthesia Type:   General     Findings:   Good reduction and fixation  Complications:   None

## 2018-03-12 NOTE — PROGRESS NOTES
Progress Note - Manish Quezada 1935, [de-identified] y o  female MRN: [id number]    Unit/Bed#: [facility] Encounter: [id number]    Primary Care Provider: Aly Salas DO   Date and time admitted to hospital: 3/8/2018  8:01 AM        * Bimalleolar fracture of right ankle   Assessment & Plan     Orthopedics consult appreciated  patient to be scheduled for surgery today  Continue supportive care, pain medications  Nonweightbearing to right lower extremity   - P T /OT        Nocturnal hypoxia   Assessment & Plan    Patient had 1 episode of oxygen sats dropping to 84%  during sleep which spontaneously resolved  May have a component of sleep apnea  Will need outpatient evaluation after discharge  Continue to monitor        Hypertension   Assessment & Plan    Blood pressure acceptable  Continue home medications amlodipine, Lasix, metoprolol  Atrial fibrillation (HCC)   Assessment & Plan    Stable  Coumadin held  Patient received 1 dose of oral vitamin K 2 5 mg on 3/10/18, INR down to 1 2  Continue home dose metoprolol for rate control  Constipation   Assessment & Plan    Resolved  Continue bowel regimen p r n  CKD (chronic kidney disease), stage III   Assessment & Plan    Creatinine at baseline, monitor        Hyperlipidemia   Assessment & Plan    Continue statin            VTE Pharmacologic Prophylaxis:   Pharmacologic: Heparin  Mechanical VTE Prophylaxis in Place: Yes    Patient Centered Rounds: I have performed bedside rounds with nursing staff today  Discussions with Specialists or Other Care Team Provider:  Discussed with case management    Education and Discussions with Family / Patient:  Explained to the patient in detail about her management plan    Time Spent for Care: 20 minutes  More than 50% of total time spent on counseling and coordination of care as described above      Current Length of Stay: 4 day(s)    Current Patient Status: Inpatient   Certification Statement: The patient will continue to require additional inpatient hospital stay due to Patient is scheduled for ankle surgery today    Discharge Plan:  Continue hospitalization, PT OT evaluation after surgery  Follow up with case management for placement    Code Status: Level 3 - DNAR and DNI      Subjective:   Patient seen and examined at 8:30 a m  Amanda Leanne She is very anxious about upcoming surgery  Otherwise she denies any other acute complaints    Objective:     Vitals:   Temp (24hrs), Av 5 °F (36 9 °C), Min:98 4 °F (36 9 °C), Max:98 7 °F (37 1 °C)    HR:  [60-70] 70  Resp:  [18] 18  BP: (108-176)/(60-80) 176/80  SpO2:  [91 %-95 %] 92 %  Body mass index is 37 24 kg/m²  Input and Output Summary (last 24 hours): Intake/Output Summary (Last 24 hours) at 18 1405  Last data filed at 18 2100   Gross per 24 hour   Intake              440 ml   Output              400 ml   Net               40 ml       Physical Exam:     Physical Exam   Constitutional: She is oriented to person, place, and time  She appears well-developed and well-nourished  HENT:   Head: Normocephalic and atraumatic  Eyes: EOM are normal  Pupils are equal, round, and reactive to light  Neck: Normal range of motion  Neck supple  Cardiovascular: Normal rate and regular rhythm  Pulmonary/Chest: Effort normal and breath sounds normal    Abdominal: Soft  Bowel sounds are normal    Musculoskeletal: Normal range of motion  Neurological: She is alert and oriented to person, place, and time  Skin: Skin is warm and dry         Additional Data:     Labs:      Results from last 7 days  Lab Units 18  0539 18  1006   WBC Thousand/uL 6 92 11 48*   HEMOGLOBIN g/dL 12 0 13 1   HEMATOCRIT % 36 8 40 5   PLATELETS Thousands/uL 193 223   NEUTROS PCT %  --  86*   LYMPHS PCT %  --  6*   MONOS PCT %  --  7   EOS PCT %  --  0       Results from last 7 days  Lab Units 18  0539   SODIUM mmol/L 139   POTASSIUM mmol/L 3 8   CHLORIDE mmol/L 103   CO2 mmol/L 27   BUN mg/dL 17   CREATININE mg/dL 1 06   CALCIUM mg/dL 8 6   GLUCOSE RANDOM mg/dL 96       Results from last 7 days  Lab Units 03/11/18  0433   INR  1 23*       * I Have Reviewed All Lab Data Listed Above  * Additional Pertinent Lab Tests Reviewed:  Allison 66 Admission Reviewed    Imaging:    Imaging Reports Reviewed Today Include:  Imaging Personally Reviewed by Myself Includes:     Recent Cultures (last 7 days):           Last 24 Hours Medication List:     Current Facility-Administered Medications:  [MAR Hold] acetaminophen 650 mg Oral Q6H PRN Toya Brown MD    [MAR Hold] amLODIPine 5 mg Oral Daily Toya Brown MD    [MAR Hold] aspirin 81 mg Oral Daily Toya Brown MD    [MAR Hold] atorvastatin 10 mg Oral Daily With Emily Malagon MD    [facility] Hold] docusate sodium 100 mg Oral BID Toya Brown MD    [MAR Hold] furosemide 20 mg Oral Daily Toya Brown MD    [MAR Hold] levothyroxine 88 mcg Oral Early Morning Toya Brown MD    [MAR Hold] magnesium hydroxide 30 mL Oral BID PRN Toya Brown MD    [MAR Hold] metoprolol succinate 100 mg Oral Daily Toya Brown MD    [MAR Hold] morphine injection 2 mg Intravenous Q4H PRN Toya Brown MD    [MAR Hold] multivitamin-minerals 1 tablet Oral Daily Toya Brown MD    [MAR Hold] ondansetron 4 mg Intravenous Q6H PRN Toya Brown MD    [MAR Hold] senna 1 tablet Oral BID Toya Brown MD    sodium chloride 75 mL/hr Intravenous Continuous Toya Brown MD Last Rate: 75 mL/hr (03/12/18 0514)   sodium chloride   PRN Prerna Barraza MD    [MAR Hold] traMADol 50 mg Oral Q6H PRN Toya Brown MD      Facility-Administered Medications Ordered in Other Encounters:  ceFAZolin   PRN Ta Tobias, CRNA   dexamethasone   PRN Ta Tobias, CRNA   dexmedetomidine   PRN At Tobias, CRNA   fentanyl citrate (PF)  Intravenous PRN Ta Tobias CRNA glycopyrrolate   PRN Reba Sour, CRNA   lidocaine   PRN Reba Sour, CRNA   neostigmine  Intravenous PRN Reba Sour, CRNA   phenlyephrine   PRN Reba Sour, CRNA   propofol  Intravenous PRN Reba Sour, CRNA   rocuronium   PRN Reba Sour, CRNA   succinylcholine  Intravenous PRN Reba Sour, CRNA        Today, Patient Was Seen By: Madan Hall MD    ** Please Note: Dictation voice to text software may have been used in the creation of this document   **

## 2018-03-12 NOTE — SOCIAL WORK
Cm spoke to [facility] from [facility] regarding admission  Alanna will eval after surgery that is being done today and let CM know if she meets the criteria

## 2018-03-12 NOTE — INTERIM OP NOTE
OPEN REDUCTION W/ INTERNAL FIXATION (ORIF) ANKLE  Open reduction internal fixation lateral malleolus with possible fixation of medial malleolus  Postoperative Note  PATIENT NAME: Ayleen De La Rosa  : 1935  MRN: [id number]  [facility] OR ROOM 03    Surgery Date: 3/12/2018    Preop Diagnosis:  Ankle fracture, bimalleolar, closed, right, initial encounter [S82 841A]    Post-Op Diagnosis Codes:     * Ankle fracture, bimalleolar, closed, right, initial encounter [T67 249N]    Procedure(s) (LRB):  OPEN REDUCTION W/ INTERNAL FIXATION (ORIF) ANKLE  Open reduction internal fixation lateral malleolus with possible fixation of medial malleolus (Right)    Surgeon(s) and Role:     * Tabatha Wiley MD - Primary    Specimens:  * No specimens in log *    Estimated Blood Loss:   Minimal    Anesthesia Type:   General    Findings:   Good reduction and fixation  Complications:   None    SIGNATURE: Tabatha Wiley MD   DATE: 2018   TIME: 2:21 PM

## 2018-03-13 PROCEDURE — 97163 PT EVAL HIGH COMPLEX 45 MIN: CPT

## 2018-03-13 PROCEDURE — 97167 OT EVAL HIGH COMPLEX 60 MIN: CPT

## 2018-03-13 PROCEDURE — G8988 SELF CARE GOAL STATUS: HCPCS

## 2018-03-13 PROCEDURE — G8979 MOBILITY GOAL STATUS: HCPCS

## 2018-03-13 PROCEDURE — 99232 SBSQ HOSP IP/OBS MODERATE 35: CPT | Performed by: INTERNAL MEDICINE

## 2018-03-13 PROCEDURE — G8978 MOBILITY CURRENT STATUS: HCPCS

## 2018-03-13 PROCEDURE — G8987 SELF CARE CURRENT STATUS: HCPCS

## 2018-03-13 PROCEDURE — 99024 POSTOP FOLLOW-UP VISIT: CPT | Performed by: ORTHOPAEDIC SURGERY

## 2018-03-13 RX ORDER — BENAZEPRIL HYDROCHLORIDE 10 MG/1
20 TABLET ORAL DAILY
Status: DISCONTINUED | OUTPATIENT
Start: 2018-03-13 | End: 2018-03-14 | Stop reason: HOSPADM

## 2018-03-13 RX ORDER — BENAZEPRIL HYDROCHLORIDE 20 MG/1
20 TABLET ORAL DAILY
COMMUNITY

## 2018-03-13 RX ADMIN — LEVOTHYROXINE SODIUM 88 MCG: 88 TABLET ORAL at 05:51

## 2018-03-13 RX ADMIN — DOCUSATE SODIUM 100 MG: 100 CAPSULE, LIQUID FILLED ORAL at 08:32

## 2018-03-13 RX ADMIN — AMLODIPINE BESYLATE 5 MG: 5 TABLET ORAL at 08:31

## 2018-03-13 RX ADMIN — SENNOSIDES 8.6 MG: 8.6 TABLET, FILM COATED ORAL at 08:31

## 2018-03-13 RX ADMIN — Medication 1 TABLET: at 08:32

## 2018-03-13 RX ADMIN — METOPROLOL SUCCINATE 100 MG: 100 TABLET, EXTENDED RELEASE ORAL at 08:31

## 2018-03-13 RX ADMIN — CEFAZOLIN SODIUM 1000 MG: 1 SOLUTION INTRAVENOUS at 07:50

## 2018-03-13 RX ADMIN — ATORVASTATIN CALCIUM 10 MG: 10 TABLET, FILM COATED ORAL at 16:18

## 2018-03-13 RX ADMIN — SODIUM CHLORIDE 75 ML/HR: 0.9 INJECTION, SOLUTION INTRAVENOUS at 05:52

## 2018-03-13 RX ADMIN — ACETAMINOPHEN 650 MG: 325 TABLET, FILM COATED ORAL at 20:31

## 2018-03-13 RX ADMIN — ASPIRIN 81 MG 81 MG: 81 TABLET ORAL at 08:32

## 2018-03-13 RX ADMIN — WARFARIN SODIUM 1 MG: 1 TABLET ORAL at 17:01

## 2018-03-13 RX ADMIN — BENAZEPRIL HYDROCHLORIDE 20 MG: 10 TABLET, FILM COATED ORAL at 16:18

## 2018-03-13 RX ADMIN — ACETAMINOPHEN 650 MG: 325 TABLET, FILM COATED ORAL at 10:56

## 2018-03-13 NOTE — OCCUPATIONAL THERAPY NOTE
Occupational Therapy Evaluation        Patient Name: Nubia Lomeli  Today's Date: 3/13/2018       03/13/18 1058   Note Type   Note type Eval only   Restrictions/Precautions   Weight Bearing Precautions Per Order Yes   LUE Weight Bearing Per Order NWB   RLE Weight Bearing Per Order NWB   Braces or Orthoses Splint  (R ankle, L wrist)   Other Precautions Chair Alarm; Bed Alarm;WBS;Multiple lines   Pain Assessment   Pain Assessment 0-10   Pain Score 4   Pain Type Acute pain   Pain Location Ankle   Pain Orientation Right   Pain Descriptors Aching  ("pinching")   Pain Frequency Constant/continuous   Clinical Progression Gradually improving   Home Living   Type of Home House   Home Layout Two level;Performs ADLs on one level; Able to live on main level with bedroom/bathroom;Stairs to enter without rails  (1st floor set up, 1+1+1 YURY)   Bathroom Shower/Tub Walk-in shower   Bathroom Toilet Standard  ("I have problems getting up off")   Bathroom Equipment Grab bars in shower;Grab bars around toilet   [address] Cane;Walker  (uses the [facility] at baseline; owns a standard walker)   Additional Comments Pt POD 1 OPEN REDUCTION W/ INTERNAL FIXATION (ORIF) ANKLE  Open reduction internal fixation lateral malleolus with possible fixation of medial malleolus (Right)  Prior Function   Level of [place] Independent with ADLs and functional mobility   Lives With Spouse   Receives Help From Family  (sister lives in [facility])   [address] [place] [address]  (per pt)   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)   Falls in the last 6 months 1 to 4   Vocational Part time employment  ((+) driving, [employer]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)   Comments Pt assists  at baseline  Lifestyle   Autonomy Patient reporting independent with ADLs/ IADLs, ambulatory with [facility], patient drives and works part time 3-4 days per week    Patient lives with spouse in a 2 story ([employer]), 1st floor set up  with 1+1+1 YURY with no railing  Reciprocal Relationships Patient has a sister that lives in [place], that may be coming in to help, patient is the primary caretaker for her   Psychosocial   Psychosocial (WDL) WDL   ADL   Eating Assistance 5  Supervision/Setup   Eating Deficit Other (Comment)  (assist with bimanual tasks )   Grooming Assistance 4  Minimal Assistance   UB Bathing Assistance 3  Moderate Assistance   LB Bathing Assistance 1  Total Assistance   UB Dressing Assistance 3  Moderate Assistance   LB Dressing Assistance 1  Total Assistance   Toileting Assistance  2  Maximal Assistance   Functional Assistance 2  Maximal Assistance   Bed Mobility   Additional Comments Pt received seated OOB in recliner upon arrival   Transfers   Sit to Stand 2  Maximal assistance   Additional items Assist x 2;Armrests; Increased time required;Verbal cues   Stand to Sit 3  Moderate assistance   Additional items Assist x 2;Armrests; Increased time required;Verbal cues   Functional Mobility   Functional Mobility 2  Maximal assistance   Balance   Static Sitting Fair +  (at edge of Recliner Chair)   Dynamic Sitting Fair   Static Standing Fair -   Activity Tolerance   Activity Tolerance Patient limited by fatigue;Patient limited by pain   Nurse Made Aware RN verbalized patient appropriate for therapy  RUE Assessment   RUE Assessment WFL   LUE Assessment   LUE Assessment X  (shoulder/elbow movements WFL/ no strength testing performed)   LUE Overall AROM   L Wrist Flexion immobilized via short leg cast    Hand Function   Gross Motor Coordination Impaired   Fine Motor Coordination Impaired   Sensation   Light Touch No apparent deficits  (RUE)   Vision-Basic Assessment   Current Vision Wears glasses all the time   Patient Visual Report Other (Comment)  (No significant changes reported)   Cognition   Overall Cognitive Status WFL   Arousal/Participation Alert; Responsive; Cooperative   Attention Within functional limits Orientation Level Oriented X4   Memory Within functional limits   Following Commands Follows all commands and directions without difficulty   AssessmentPatient is a [de-identified] y o  female seen for OT evaluation s/p admit to [address] on 3/8/2018 w/Bimalleolar fracture of right ankle  Pt presents after fall where she sustained fx of R ankle, now POD#1 with ORIF with possible fixation of medial malleolus  Pt with recent ED visit on 3/4/18 s/p fall and landing on L wrist, sustained "impacted mildly displaced intra-articular distal radial metaphysis fracture " A splint was applied and recommended NWB to LUE  Commorbidities affecting patient's functional performance at time of assessment include: A fib, disease of thyroid gland, HTN  Prior to admission, Patient reporting independent with ADLs/ IADLs, ambulatory with [facility], patient drives and works part time 3-4 days per week  Patient lives with spouse in a 2 story ([employer]), 1st floor set up  with 1+1+1 YURY with no railing  Personal factors affecting patient at time of initial evaluation include: limited caregiver support, steps to enter, decreased initiation and engagement, difficulty performing ADLs and difficulty performing IADLs  Upon evaluation, patient requires moderate and maximal assist for UB ADLs, maximal and total assist for LB ADLs, sit to stand transfers with maximal assist, with the use of hand held assist and one point support for RUE    Occupational performance is affected by the following deficits: decreased functional use of BUEs, in hand manipulation deficit with impaired reach, grasp and coordination, limited active ROM, decreased muscle strength, degenerative arthritic joint changes, impaired gross motor coordination, dynamic sit/ stand balance deficit with poor standing tolerance time for self care and functional mobility, decreased activity tolerance, decreased safety awareness, increased pain, orthopedic restrictions and postural control and postural alignment deficit, requiring external assistance to complete transitional movements  Therapist completed  extensive additional review of medical records and additional review of physical, cognitive or psychosocial history, clinical examination identifying 5 or more performance deficits, clinical decision making of a high complexity , consistent with a high complexity level evaluation  Patient to benefit from continued Occupational Therapy treatment while in the hospital to address deficits as defined above and maximize level of functional independence with ADLs and functional mobility  Occupational Performance areas to address include: grooming , bathing/ shower, dressing, toilet hygiene, transfer to all surfaces, functional ambulation, functional mobility, IADLs: safety procedures, IADLs: meal prep/ clean up, Leisure Participation and Social participation  From OT standpoint, recommendation at time of d/c would be Short Term Rehab  Limitation Decreased ADL status; Decreased UE ROM; Decreased UE strength;Decreased Safe judgement during ADL;Decreased endurance;Decreased fine motor control;Decreased self-care trans;Decreased high-level ADLs; Non-func L UE   Prognosis Good   Plan   Treatment Interventions ADL retraining;Functional transfer training;UE strengthening/ROM; Endurance training;Patient/family training;Equipment evaluation/education; Fine motor coordination activities; Compensatory technique education;Continued evaluation; Energy conservation; Activityengagement   Goal Expiration Date 03/27/18   OT Frequency 3-5x/wk   Recommendation   Recommendation Physiatry Consult   OT Discharge Recommendation Short Term Rehab   Equipment Recommended Bedside commode;Tub seat with back   OT - OK to Discharge Yes  (STR when medically cleared)   Barthel Index   Feeding 10   Bathing 0   Grooming Score 0   Dressing Score 5   Bladder Score 10   Bowels Score 10   Toilet Use Score 5   Transfers (Bed/Chair) Score 5 Mobility (Level Surface) Score 0   Stairs Score 0   Barthel Index Score 45   Modified Eli Scale   Modified Platte Scale 4       Occupational Therapy goals: In 7-14 days:     1- Patient will verbalize and demonstrate use of energy conservation/ deep breathing technique and work simplification skills during functional activity with no verbal cues  2-Patient will verbalize and demonstrate good body mechanics and joint protection techniques during  ADLs/ IADLs with no verbal cues  3- Patient will increase OOB/ sitting tolerance to 2-4 hours per day for increased participation in self care and leisure tasks with no s/s of exertion  4-Patient will increase standing tolerance time to 5  minutes with unilateral UE support to complete sink level ADLs@ mod I level   5- Patient will increase sitting tolerance at edge of bed to 20 minutes to complete UB ADLs @ set up assist level  6- Patient will transfer bed to Chair / toilet at Set up assist level with AD as indicated  7- Patient will complete UB ADLs with set up assist  8- Patient will complete LB ADLs with min assist with the use of adaptive equipment  9- Patient will complete toileting hygiene with set up assist/ supervision for thoroughness    10-Patient/ Family  will demonstrate competency with UE Home Exercise Program

## 2018-03-13 NOTE — PROGRESS NOTES
Progress Note - Orthopedics   Evans Judd [de-identified] y o  female MRN: [id number]  Unit/Bed#: -01 Encounter: [id number]    Assessment:  1  POD 1 s/p ORIF right ankle  2  Left distal radius fracture    Plan:  1  Pain control  2  NWB right ankle  3  Platform weight bearing left upper extremity should be ok with this type of fracture to help with mobilization in setting of concurrent ankle fracture  4  DVT prophylaxis - back on coumadin    Weight bearing: as above    Subjective: minimal discomfort today in either wrist or ankle    Vitals: Blood pressure 133/78, pulse 57, temperature 98 7 °F (37 1 °C), temperature source Oral, resp  rate 17, height 5' 4" (1 626 m), weight 98 4 kg (216 lb 14 9 oz), SpO2 94 %  ,Body mass index is 37 24 kg/m²  Intake/Output Summary (Last 24 hours) at 03/13/18 1418  Last data filed at 03/13/18 2135   Gross per 24 hour   Intake             1050 ml   Output             1300 ml   Net             -250 ml       Invasive Devices     Peripheral Intravenous Line            Peripheral IV 03/12/18 Right Hand 1 day                Physical Exam: alert, no distress  Ortho Exam:   Left upper extremity: splint clean and intact  Digits warm and well-perfused  Able to flex and extend all digits  Sensation intact to light touch in all distal nerve distributions  Right ankle: splint clean and intact  Toes warm and well-perfused  Able to flex and extend all toes   Sensation intact to light touch in all distal nerve distributions

## 2018-03-13 NOTE — PLAN OF CARE
Problem: OCCUPATIONAL THERAPY ADULT  Goal: Performs self-care activities at highest level of function for planned discharge setting  See evaluation for individualized goals  Treatment Interventions: ADL retraining, Functional transfer training, UE strengthening/ROM, Endurance training, Patient/family training, Equipment evaluation/education, Fine motor coordination activities, Compensatory technique education, Continued evaluation, Energy conservation, Activityengagement  Equipment Recommended: Bedside commode, Tub seat with back       See flowsheet documentation for full assessment, interventions and recommendations  Limitation: Decreased ADL status, Decreased UE ROM, Decreased UE strength, Decreased Safe judgement during ADL, Decreased endurance, Decreased fine motor control, Decreased self-care trans, Decreased high-level ADLs, Non-func L UE  Prognosis: Good  Assessment: Patient is a [de-identified] y o  female seen for OT evaluation s/p admit to [facility] on 3/8/2018 w/Bimalleolar fracture of right ankle  Pt presents after fall where she sustained fx of R ankle, now POD#1 with ORIF with possible fixation of medial malleolus  Pt with recent ED visit on 3/4/18 s/p fall and landing on L wrist, sustained "impacted mildly displaced intra-articular distal radial metaphysis fracture " A splint was applied and recommended NWB to LUE  Commorbidities affecting patient's functional performance at time of assessment include: A fib, disease of thyroid gland, HTN  Prior to admission, Patient reporting independent with ADLs/ IADLs, ambulatory with [facility], patient drives and works part time 3-4 days per week  Patient lives with spouse in a 2 story (old [facility]), 1st floor set up  with 1+1+1 YURY with no railing  Personal factors affecting patient at time of initial evaluation include: limited caregiver support, steps to enter, decreased initiation and engagement, difficulty performing ADLs and difficulty performing IADLs  Upon evaluation, patient requires moderate and maximal assist for UB ADLs, maximal and total assist for LB ADLs, sit to stand transfers with maximal assist, with the use of hand held assist and one point support for RUE  Occupational performance is affected by the following deficits: decreased functional use of BUEs, in hand manipulation deficit with impaired reach, grasp and coordination, limited active ROM, decreased muscle strength, degenerative arthritic joint changes, impaired gross motor coordination, dynamic sit/ stand balance deficit with poor standing tolerance time for self care and functional mobility, decreased activity tolerance, decreased safety awareness, increased pain, orthopedic restrictions and postural control and postural alignment deficit, requiring external assistance to complete transitional movements  Therapist completed  extensive additional review of medical records and additional review of physical, cognitive or psychosocial history, clinical examination identifying 5 or more performance deficits, clinical decision making of a high complexity , consistent with a high complexity level evaluation  Patient to benefit from continued Occupational Therapy treatment while in the hospital to address deficits as defined above and maximize level of functional independence with ADLs and functional mobility     Recommendation: Physiatry Consult  OT Discharge Recommendation: Short Term Rehab  OT - OK to Discharge: Yes (STR when medically cleared)

## 2018-03-13 NOTE — PLAN OF CARE
Problem: PHYSICAL THERAPY ADULT  Goal: Performs mobility at highest level of function for planned discharge setting  See evaluation for individualized goals  Treatment/Interventions: Functional transfer training, LE strengthening/ROM, Therapeutic exercise, Endurance training, Patient/family training, Equipment eval/education, Bed mobility, Compensatory technique education, Continued evaluation, Spoke to nursing, Spoke to case management, OT, Family  Equipment Recommended:  (TBD at [employer])       See flowsheet documentation for full assessment, interventions and recommendations  Prognosis: Good  Problem List: Decreased strength, Decreased endurance, Impaired balance, Decreased mobility, Decreased safety awareness, Decreased skin integrity, Orthopedic restrictions, Pain  Assessment: Pt is [de-identified] y o  female seen for PT evaluation on 3/13/2018 s/p admit to [facility] on 3/8/2018 w/ Bimalleolar fracture of right ankle  Pt presents after fall where she sustained fx of R ankle, now POD#1 with ORIF with possible fixation of medial malleolus  Pt with recent ED visit on 3/4/18 s/p fall and landing on L wrist, sustained "impacted mildly displaced intra-articular distal radial metaphysis fracture " A splint was applied and recommended NWB to LUE  PT consulted to assess pt's functional mobility and d/c needs  Order placed for PT eval and tx, w/ NWB RLE and LUE; up with crutches order  Performed at least 2 patient identifiers during session: Name and wristband  Comorbidities affecting pt's physical performance at time of assessment include: A fib, disease of thyroid gland, HTN  PTA, pt was independent w/ all functional mobility w/ SPC prn, ambulates unrestricted distances and all terrain, has 1+1+1 YURY, lives w/  in 2 level home and works part time   Personal factors affecting pt at time of IE include: inaccessible home environment, lives in 2 story house, stairs to enter home, inability to navigate level surfaces w/o external assistance, unable to perform dynamic tasks in community, limited home support, positive fall history, decreased initiation and engagement, inability to perform current job functions and limited insight into impairments  Please find objective findings from PT assessment regarding body systems outlined above with impairments and limitations including weakness, impaired balance, decreased endurance, pain, decreased activity tolerance, decreased safety awareness, fall risk, orthopedic restrictions and decreased skin integrity, as well as mobility assessment (need for mod-max of 2 assist w/ all phases of mobility when usually ambulating independently and need for cueing for mobility technique)  The following objective measures performed on IE also reveal limitations: Barthel Index: 45/100 and Modified [place]: 4 (moderate/severe disability)  Pt's clinical presentation is currently unstable/unpredictable seen in pt's presentation of need for input for task focus and mobility technique, need for mod - max of 2 assist w/ all phases of mobility when usually mobilizing independently, R ankle pain impacting overall mobility status, on telemetry monitoring, unstable medical status and NWB restriction to R LE and L UE  Pt to benefit from continued PT tx to address deficits as defined above and maximize level of functional inde  Barriers to Discharge: Inaccessible home environment, Decreased caregiver support  Barriers to Discharge Comments: MD to clarify if platform allowed to LUE prior to trialing, PT will maintain NWB to entire LUE currently  Recommendation: Short-term skilled PT, Rehab consult     PT - OK to Discharge: Yes (when medically cleared if to STR)    See flowsheet documentation for full assessment

## 2018-03-13 NOTE — PHYSICAL THERAPY NOTE
Physical Therapy Evaluation     Patient's Name: Liliana Whaley    Admitting Diagnosis  Ankle injury [S99 918N]  Cast discomfort [Z47 89]  Ankle fracture, bimalleolar, closed, right, initial encounter [S82 841A]  Distal radius fracture, left [S52 502A]  Ambulatory dysfunction [R26 2]    Problem List  Patient Active Problem List   Diagnosis    Bimalleolar fracture of right ankle    Atrial fibrillation ([address] )    Fracture of distal end of left radius    Hypertension    Hyperlipidemia    Post-surgical hypothyroidism    CKD (chronic kidney disease), stage III    Leucocytosis    Ankle fracture, bimalleolar, closed, right, initial encounter    Constipation    Nocturnal hypoxia       Past Medical History  Past Medical History:   Diagnosis Date    Atrial fibrillation ([address] )     Disease of thyroid gland     Hyperlipidemia     Hypertension        Past Surgical History  Past Surgical History:   Procedure Laterality Date    ORIF TIBIA & FIBULA FRACTURES Right 3/12/2018    Procedure: OPEN REDUCTION W/ INTERNAL FIXATION (ORIF) ANKLE  Open reduction internal fixation lateral malleolus with possible fixation of medial malleolus;  Surgeon: Josh Dickerson MD;  Location: [facility];  Service: Orthopedics    TOTAL THYROIDECTOMY        03/13/18 1128   Note Type   Note type Eval/Treat   Pain Assessment   Pain Assessment 0-10   Pain Score 4   Pain Location Ankle   Pain Orientation Right   Pain Descriptors Aching  ("pinching")   Pain Frequency Constant/continuous   Pain Onset Ongoing   Clinical Progression Gradually improving   Effect of Pain on Daily Activities limits tolerance   Home Living   Type of [address] Two level;Performs ADLs on one level; Able to live on main level with bedroom/bathroom;Stairs to enter without rails  (1st floor set up, 1+1+1 YURY)   Bathroom Shower/Tub Walk-in shower   Bathroom Toilet Standard  ("I have problems getting up off")   [employer] Grab bars in shower;Grab bars around toilet   [address] Walker;Cane  (uses the [facility] at baseline; owns a standard walker)   Prior Function   Level of [place] Independent with ADLs and functional mobility   Lives With Spouse   Receives Help From Family  (sister lives in [facility])   [address] [facility]  (per pt)   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)   Falls in the last 6 months 1 to 4   Vocational Part time employment  ((+) driving, [employer]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)   Comments Pt assists  at baseline  Restrictions/Precautions   Weight Bearing Precautions Per Order Yes   LUE Weight Bearing Per Order NWB   RLE Weight Bearing Per Order NWB   Braces or Orthoses Splint  (R ankle, L wrist)   Other Precautions Chair Alarm; Bed Alarm;WBS;Multiple lines   General   Additional Pertinent History Pt POD 1 OPEN REDUCTION W/ INTERNAL FIXATION (ORIF) ANKLE Open reduction internal fixation lateral malleolus with possible fixation of medial malleolus (Right)     Family/Caregiver Present Yes  ( 2nd half of PT IE)   Cognition   Overall Cognitive Status WFL   Arousal/Participation Alert   Attention Within functional limits   Orientation Level Oriented X4   Memory Within functional limits   Following Commands Follows all commands and directions without difficulty   RUE Assessment   RUE Assessment WFL   LUE Assessment   LUE Assessment X  (shoulder/elbow movements WFL/ no strength testing performed)   LUE Overall AROM   L Wrist Flexion immobilized via short leg cast    RLE Assessment   RLE Assessment X  (no strength testing performed 2* recent surgery)   LLE Assessment   LLE Assessment X   Strength LLE   L Knee Extension 3+/5   Coordination   Movements are Fluid and Coordinated 1   Sensation WFL   Light Touch   RLE Light Touch Grossly intact   LLE Light Touch Grossly intact   Bed Mobility   Additional Comments Pt received seated OOB in recliner upon arrival   Transfers   Sit to Stand 2  Maximal assistance Additional items Assist x 2; Increased time required;Verbal cues   Stand to Sit 3  Moderate assistance   Additional items Assist x 2; Increased time required;Verbal cues   Additional Comments Pt able to maintain NWB of RLE and % of the time with vc required for such with standing attempt  HHA to R UE provided for stability  Ambulation/Elevation   Gait pattern Not appropriate; Not tested   Balance   Static Sitting Fair +   Dynamic Sitting Fair   Static Standing Poor +   Dynamic Standing (DNT)   Ambulatory (DNT)   Endurance Deficit   Endurance Deficit Yes   Activity Tolerance   Activity Tolerance Patient limited by fatigue;Patient limited by pain   Medical Staff Made Aware yes OT [place] Scriver; KIM Ty notified of PT recs   Nurse Made Aware yes, RN Rodolfo Campos verbalized pt appropriate to see, made aware of session outcome/recs   Assessment   Prognosis Good   Problem List Decreased strength;Decreased endurance; Impaired balance;Decreased mobility; Decreased safety awareness;Decreased skin integrity;Orthopedic restrictions;Pain   Assessment Pt is [de-identified] y o  female seen for PT evaluation on 3/13/2018 s/p admit to [facility] on 3/8/2018 w/ Bimalleolar fracture of right ankle  Pt presents after fall where she sustained fx of R ankle, now POD#1 with ORIF with possible fixation of medial malleolus  Pt with recent ED visit on 3/4/18 s/p fall and landing on L wrist, sustained "impacted mildly displaced intra-articular distal radial metaphysis fracture " A splint was applied and recommended NWB to LUE  PT consulted to assess pt's functional mobility and d/c needs  Order placed for PT eval and tx, w/ NWB RLE and LUE; up with crutches order  Performed at least 2 patient identifiers during session: Name and wristband  Comorbidities affecting pt's physical performance at time of assessment include: A fib, disease of thyroid gland, HTN   PTA, pt was independent w/ all functional mobility w/ SPC prn, ambulates unrestricted distances and all terrain, has 1+1+1 YURY, lives w/  in 2 level home and works part time  Personal factors affecting pt at time of IE include: inaccessible home environment, lives in 2 story house, stairs to enter home, inability to navigate level surfaces w/o external assistance, unable to perform dynamic tasks in community, limited home support, positive fall history, decreased initiation and engagement, inability to perform current job functions and limited insight into impairments  Please find objective findings from PT assessment regarding body systems outlined above with impairments and limitations including weakness, impaired balance, decreased endurance, pain, decreased activity tolerance, decreased safety awareness, fall risk, orthopedic restrictions and decreased skin integrity, as well as mobility assessment (need for mod-max of 2 assist w/ all phases of mobility when usually ambulating independently and need for cueing for mobility technique)  The following objective measures performed on IE also reveal limitations: Barthel Index: 45/100 and Modified [place]: 4 (moderate/severe disability)  Pt's clinical presentation is currently unstable/unpredictable seen in pt's presentation of need for input for task focus and mobility technique, need for mod - max of 2 assist w/ all phases of mobility when usually mobilizing independently, R ankle pain impacting overall mobility status, on telemetry monitoring, unstable medical status and NWB restriction to R LE and L UE  Pt to benefit from continued PT tx to address deficits as defined above and maximize level of functional independent mobility and consistency  From PT/mobility standpoint, recommendation at time of d/c would be STR pending progress in order to facilitate return to PLOF     Barriers to Discharge Inaccessible home environment;Decreased caregiver support   Barriers to Discharge Comments MD to clarify if platform allowed to LUE prior to trialing, PT will maintain NWB to entire LUE currently  Goals   Patient Goals to return to PLOF   STG Expiration Date 03/23/18   Short Term Goal #1 In 7-10 days: Increase bilateral LE strength 1/2 grade to facilitate independent mobility, Perform all bed mobility tasks with close S to decrease caregiver burden while maintaining NWB restrictions 100% of the time, Perform all transfers with min A of 1 to improve independence while maintaining NWB restrictions 100% of the time, Increase all balance 1/2 grade to decrease risk for falls, Complete exercise program independently, Tolerate 4 hr OOB to faciliate upright tolerance and Improve Barthel Index score to 60 or greater to facilitate independence  PT to trial transfer/sliding board transfer to facilitate independent mobility/transfers OOB  PT to trial w/c mobility while pt maintaining NWB restrictions 100% of the time  Treatment Day 0   Plan   Treatment/Interventions Functional transfer training;LE strengthening/ROM; Therapeutic exercise; Endurance training;Patient/family training;Equipment eval/education; Bed mobility; Compensatory technique education;Continued evaluation;Spoke to nursing;Spoke to case management;OT;Family   PT Frequency 7x/wk   Recommendation   Recommendation Short-term skilled PT;Rehab consult   Equipment Recommended (TBD at STR)   PT - OK to Discharge Yes  (when medically cleared if to STR)   Modified [place] Scale   Modified [place] Scale 4   Barthel Index   Feeding 10   Bathing 0   Grooming Score 0   Dressing Score 5   Bladder Score 10   Bowels Score 10   Toilet Use Score 5   Transfers (Bed/Chair) Score 5   Mobility (Level Surface) Score 0   Stairs Score 0   Barthel Index Score 45       Ria Tierney, PT, DPT

## 2018-03-13 NOTE — SOCIAL WORK
CM asked PT to eval pt today for possible admission to [facility]    Alanna will watch for PT note

## 2018-03-13 NOTE — PLAN OF CARE
Problem: DISCHARGE PLANNING - CARE MANAGEMENT  Goal: Discharge to post-acute care or home with appropriate resources  INTERVENTIONS:  - Conduct assessment to determine patient/family and health care team treatment goals, and need for post-acute services based on payer coverage, community resources, and patient preferences, and barriers to discharge  - Address psychosocial, clinical, and financial barriers to discharge as identified in assessment in conjunction with the patient/family and health care team  - Arrange appropriate level of post-acute services according to patients   needs and preference and payer coverage in collaboration with the physician and health care team  - Communicate with and update the patient/family, physician, and health care team regarding progress on the discharge plan  - Arrange appropriate transportation to post-acute venues    Outcome: Progressing  CM asked PT to eval pt today for possible admission to [facility]    Alanna will watch for PT note

## 2018-03-14 ENCOUNTER — HOSPITAL ENCOUNTER (OUTPATIENT)
Facility: HOSPITAL | Age: 83
Discharge: NURSING, REHAB OR LONG-TERM CARE | End: 2018-03-28
Attending: PHYSICAL MEDICINE & REHABILITATION | Admitting: PHYSICAL MEDICINE & REHABILITATION

## 2018-03-14 VITALS
OXYGEN SATURATION: 93 % | DIASTOLIC BLOOD PRESSURE: 65 MMHG | BODY MASS INDEX: 37.04 KG/M2 | RESPIRATION RATE: 18 BRPM | SYSTOLIC BLOOD PRESSURE: 147 MMHG | TEMPERATURE: 98.5 F | HEIGHT: 64 IN | WEIGHT: 216.93 LBS | HEART RATE: 61 BPM

## 2018-03-14 LAB
ANION GAP SERPL CALCULATED.3IONS-SCNC: 5 MMOL/L (ref 4–13)
BUN SERPL-MCNC: 18 MG/DL (ref 5–25)
CALCIUM SERPL-MCNC: 8.3 MG/DL (ref 8.3–10.1)
CHLORIDE SERPL-SCNC: 104 MMOL/L (ref 100–108)
CO2 SERPL-SCNC: 29 MMOL/L (ref 21–32)
CREAT SERPL-MCNC: 0.99 MG/DL (ref 0.6–1.3)
ERYTHROCYTE [DISTWIDTH] IN BLOOD BY AUTOMATED COUNT: 14.2 % (ref 11.6–15.1)
GFR SERPL CREATININE-BSD FRML MDRD: 54 ML/MIN/1.73SQ M
GLUCOSE SERPL-MCNC: 89 MG/DL (ref 65–140)
HCT VFR BLD AUTO: 32.6 % (ref 34.8–46.1)
HGB BLD-MCNC: 10.3 G/DL (ref 11.5–15.4)
INR PPP: 1.34 (ref 0.86–1.16)
MCH RBC QN AUTO: 30.2 PG (ref 26.8–34.3)
MCHC RBC AUTO-ENTMCNC: 31.6 G/DL (ref 31.4–37.4)
MCV RBC AUTO: 96 FL (ref 82–98)
PLATELET # BLD AUTO: 221 THOUSANDS/UL (ref 149–390)
PMV BLD AUTO: 9.8 FL (ref 8.9–12.7)
POTASSIUM SERPL-SCNC: 4 MMOL/L (ref 3.5–5.3)
PROTHROMBIN TIME: 16.9 SECONDS (ref 12.1–14.4)
RBC # BLD AUTO: 3.41 MILLION/UL (ref 3.81–5.12)
SODIUM SERPL-SCNC: 138 MMOL/L (ref 136–145)
WBC # BLD AUTO: 8.03 THOUSAND/UL (ref 4.31–10.16)

## 2018-03-14 PROCEDURE — 99024 POSTOP FOLLOW-UP VISIT: CPT | Performed by: PHYSICIAN ASSISTANT

## 2018-03-14 PROCEDURE — G8987 SELF CARE CURRENT STATUS: HCPCS

## 2018-03-14 PROCEDURE — 85027 COMPLETE CBC AUTOMATED: CPT | Performed by: INTERNAL MEDICINE

## 2018-03-14 PROCEDURE — 99238 HOSP IP/OBS DSCHRG MGMT 30/<: CPT | Performed by: INTERNAL MEDICINE

## 2018-03-14 PROCEDURE — 97535 SELF CARE MNGMENT TRAINING: CPT

## 2018-03-14 PROCEDURE — 97110 THERAPEUTIC EXERCISES: CPT

## 2018-03-14 PROCEDURE — 97530 THERAPEUTIC ACTIVITIES: CPT

## 2018-03-14 PROCEDURE — 80048 BASIC METABOLIC PNL TOTAL CA: CPT | Performed by: INTERNAL MEDICINE

## 2018-03-14 PROCEDURE — G8988 SELF CARE GOAL STATUS: HCPCS

## 2018-03-14 PROCEDURE — 85610 PROTHROMBIN TIME: CPT | Performed by: INTERNAL MEDICINE

## 2018-03-14 RX ORDER — TRAMADOL HYDROCHLORIDE 50 MG/1
50 TABLET ORAL EVERY 6 HOURS PRN
Qty: 30 TABLET | Refills: 0 | Status: SHIPPED | OUTPATIENT
Start: 2018-03-14 | End: 2018-03-24

## 2018-03-14 RX ORDER — WARFARIN SODIUM 2.5 MG/1
2.5 TABLET ORAL
Status: DISCONTINUED | OUTPATIENT
Start: 2018-03-14 | End: 2018-03-14 | Stop reason: HOSPADM

## 2018-03-14 RX ORDER — WARFARIN SODIUM 2.5 MG/1
TABLET ORAL
Qty: 30 TABLET | Refills: 0 | Status: SHIPPED | OUTPATIENT
Start: 2018-03-14 | End: 2018-08-31

## 2018-03-14 RX ORDER — DOCUSATE SODIUM 100 MG/1
100 CAPSULE, LIQUID FILLED ORAL 2 TIMES DAILY
Qty: 10 CAPSULE | Refills: 0 | Status: SHIPPED | OUTPATIENT
Start: 2018-03-14 | End: 2018-04-24

## 2018-03-14 RX ADMIN — SENNOSIDES 8.6 MG: 8.6 TABLET, FILM COATED ORAL at 10:19

## 2018-03-14 RX ADMIN — ENOXAPARIN SODIUM 100 MG: 100 INJECTION SUBCUTANEOUS at 14:16

## 2018-03-14 RX ADMIN — AMLODIPINE BESYLATE 5 MG: 5 TABLET ORAL at 10:18

## 2018-03-14 RX ADMIN — DOCUSATE SODIUM 100 MG: 100 CAPSULE, LIQUID FILLED ORAL at 10:18

## 2018-03-14 RX ADMIN — Medication 1 TABLET: at 10:18

## 2018-03-14 RX ADMIN — ACETAMINOPHEN 650 MG: 325 TABLET, FILM COATED ORAL at 10:18

## 2018-03-14 RX ADMIN — BENAZEPRIL HYDROCHLORIDE 20 MG: 10 TABLET, FILM COATED ORAL at 10:19

## 2018-03-14 RX ADMIN — METOPROLOL SUCCINATE 100 MG: 100 TABLET, EXTENDED RELEASE ORAL at 10:19

## 2018-03-14 RX ADMIN — LEVOTHYROXINE SODIUM 88 MCG: 88 TABLET ORAL at 05:24

## 2018-03-14 RX ADMIN — ASPIRIN 81 MG 81 MG: 81 TABLET ORAL at 10:18

## 2018-03-14 NOTE — PHYSICAL THERAPY NOTE
Physical Therapy Treatment Note       03/14/18 1203   Pain Assessment   Pain Assessment 0-10   Pain Score 5  (some discomfort to R ankle)   Pain Type Surgical pain   Pain Location Ankle   Pain Orientation Right   Restrictions/Precautions   Weight Bearing Precautions Per Order Yes   LUE Weight Bearing Per Order NWB  (allowing platform weight bearing only per ortho)   RLE Weight Bearing Per Order NWB   Braces or Orthoses (R ankle, L wrist splint)   Other Precautions Chair Alarm; Bed Alarm;WBS; Telemetry; Fall Risk;Pain   General   Chart Reviewed Yes   Response to Previous Treatment Patient with no complaints from previous session  Family/Caregiver Present Yes  ()   Cognition   Overall Cognitive Status WFL   Arousal/Participation Alert; Responsive; Cooperative   Attention Within functional limits   Orientation Level Oriented X4   Memory Within functional limits   Following Commands Follows all commands and directions without difficulty   Comments pt agreeable to PT session   Subjective   Subjective "I am worried"   Bed Mobility   Additional Comments pt received seated OOB in recliner upon arrival, pt noting she just transferred back from commode to recliner via [employer] staff assistance  Note pt able to trial 1 standing attempt with L platform RW, pt able to maintain NWB of R % of the time, platform support of LUE  Visual demonstration performed by PT demonstrating positioning, posture, and maintenance of WBing per ortho recs  Pt requiring mod A of 2 for 1 sit<>stand attempt, tactile cues required for lateral weight shifting as pre gait activity, no forward advancement appropriate at this time  Note 15-20 sec into standing attempt, pt reporting lightheadedness, pt immediately seated back onto recliner safely with assist required for eccentric control   Pt noting improvement once seated with vitals once seated 121/62mmHg, 98% SPO2 on RA, 65bpm   Transfers   Sit to Stand 3  Moderate assistance   Additional items Assist x 2; Increased time required;Verbal cues  (R armrest)   Stand to Sit 3  Moderate assistance   Additional items Assist x 2; Increased time required;Verbal cues   Additional Comments Pt able to maintain NWB of RLE and % of the time with vc required for such with standing attempt   Ambulation/Elevation   Gait pattern Not appropriate; Not tested   Balance   Static Sitting Fair +   Dynamic Sitting Fair   Static Standing Fair -   Dynamic Standing (DNT)   Ambulatory (DNT)   Endurance Deficit   Endurance Deficit Yes   Activity Tolerance   Activity Tolerance Patient limited by fatigue   Medical Staff Made Aware yes, KIM Bishop   Nurse Made Aware yes, VENECIA Campos verbalized pt appropriate to see, made aware of session outcome/recs   Exercises   Heelslides Sitting;10 reps;AROM; Left   Glute Sets Sitting;5 reps;AROM; Bilateral   Hip Abduction Sitting;10 reps;AROM; Left   Knee AROM Long Arc Quad Sitting;10 reps;AROM; Bilateral   Ankle Pumps Sitting;10 reps;AROM; Left   Assessment   Prognosis Good   Problem List Decreased strength;Decreased endurance; Impaired balance;Decreased mobility; Decreased safety awareness;Decreased skin integrity;Orthopedic restrictions;Pain   Assessment Pt seen for PT treatment session this date with interventions consisting of Therapeutic exercise consisting of: AROM 5 B LE in sitting position and therapeutic activity consisting of training: sit<>stand transfers, static standing tolerance for 15 sec w/ R UE support and vc and tactile cues for static standing posture faciliation  Pt agreeable to PT treatment session upon arrival, pt found seated OOB in recliner, in no apparent distress, A&O x 4 and responsive  In comparison to previous session, pt with improvements in initiation of standing trial of 1 attempt, pt able to maintain NWB of R % of the time, platform support of LUE; pt able to initiate B LE Lila in pain free range with verbal cues for technique   Pt w/ fair carryover for HEP, verbalizing understanding  PT to assess carryover next session  and Post session: pt returned back to recliner, chair alarm engaged, all needs in reach and RN notified of session findings/recommendations  Continue to recommend STR at time of d/c in order to maximize pt's functional independence and safety w/ mobility  Pt continues to be functioning below baseline level, and remains limited 2* factors listed above  PT will continue to see pt while here in order to address the deficits listed above and provide interventions consistent w/ POC in effort to achieve STGs  Barriers to Discharge Inaccessible home environment;Decreased caregiver support   Goals   Patient Goals to move better   STG Expiration Date 03/23/18   Treatment Day 1   Plan   Treatment/Interventions Functional transfer training;LE strengthening/ROM; Therapeutic exercise; Endurance training;Patient/family training;Equipment eval/education; Bed mobility; Compensatory technique education;Continued evaluation;Spoke to nursing;Spoke to case management;OT;Family   Progress Slow progress, decreased activity tolerance   PT Frequency 7x/wk   Recommendation   Recommendation Short-term skilled PT;Rehab consult   Equipment Recommended (TBD at STR)   PT - OK to Discharge Yes  (when medically cleared if to STR)       Cloteal Fair, PT, DPT    Time of PT treatment session: [id number]

## 2018-03-14 NOTE — ASSESSMENT & PLAN NOTE
· Stable  Coumadin restarted patient reports she takes coumadin 1 mg every day but Friday when she takes 2 mg  If not therapeutic in am will ask ortho if I can use bridging lovenox or heparin drip  Patient received 1 dose of oral vitamin K 2 5 mg on 3/10/18, INR down to 1 2  May take some time to get back to therapeutic  ·  Continue home dose metoprolol for rate control

## 2018-03-14 NOTE — DISCHARGE SUMMARY
Discharge Summary - [address] [facility]    Patient Information: Fabi Petersen [de-identified] y o  female MRN: [id number]  Unit/Bed#: -01 Encounter: [id number]    Discharging Physician / Practitioner: Zeina Real MD  PCP: Nkechi Henderson DO  Admission Date: 3/8/2018  Discharge Date: 03/14/18    Reason for Admission:  Fall with leg pain    Discharge Diagnoses:     Principal Problem:  ·   Bimalleolar fracture of right ankle Post Op Day # 2, NWB  Tramadol for pain  Active Problems:  ·   Fracture of distal end of left radius: No surgical repair needed  Keep brace and ace wrap and use platform walker  ·   Atrial fibrillation ([address] ): continue metoprolol for rate control  Lovenox 1 mg/kg q 12 with Coumadin 2 5 mg daily until therapeutic  ·   Hypertension: Metoprolol with Ace ,norvasc and lasix  ·   CKD (chronic kidney disease), stage III: continue lasix, monitor I/o  ·   Hyperlipidemia: continue lipitor  ·   Leucocytosis: resolved at discharge  ·   Post-surgical hypothyroidism: continue synthroid  ·   Constipation: continue colace for constipation          Consultations During Hospital Stay:  · Orthopedics    Procedures Performed:     · Surgical repair of bimalleolar fracture of right ankle and     Significant Findings / Test Results:     · As above  · Discharging INR 1 34    Incidental Findings:   · none    Test Results Pending at Discharge (will require follow up):   · none     Outpatient Tests Requested:  · none    Complications:  none    Hospital Course:     Fabi Petersen is a [de-identified] y o  female patient who originally presented to the hospital on 3/8/2018 due to a fall on the ice  Patient presented to the ED and was found to have a nondisplaced bimalleolar fracture  She recently fracture her left radial fracture and has been in a cast  She was admitted and orthopedics deemed her a surgical candiadte but was on the 3/13 to permit her INR to correct  She was on coumadin for atrial fib   Ultimatley she required vitamin K x 1 dose   She was restarted on her coumadin post op day 2 with overlap of lovenox 1 mg/kg q 12  She was approved for [facility] rehab and was transferred on the day of discharge  Condition at Discharge: good     Discharge Day Visit / Exam:     Subjective:  Patient is stressed out regarding injury and need to be away from home and [facility]  Vitals: Blood Pressure: 147/65 (03/14/18 1100)  Pulse: 61 (03/14/18 1100)  Temperature: 98 5 °F (36 9 °C) (03/14/18 1100)  Temp Source: Oral (03/14/18 1100)  Respirations: 18 (03/14/18 1100)  Height: 5' 4" (162 6 cm) (03/08/18 1626)  Weight - Scale: 98 4 kg (216 lb 14 9 oz) (03/08/18 1626)  SpO2: 93 % (03/14/18 1100)  Exam:   Physical Exam  General well-developed moderately overweight female in no acute distress other than emotionally upset about being debilitated  Normocephalic atraumatic pupils equal round reactive to light extraocular muscles intact mucous membranes are moist neck is supple there is no JVD no lymph nodes no carotid bruits chest is decreased but clear to auscultation is no rhonchi rales or wheezes  Cardiovascular regular rate rhythm positive S1 and S2 no S3-S4 murmur or gallop  Abdomen is soft nontender and nondistended with positive bowel sounds no hepatosplenomegaly no guarding or rebound  Extremities no clubbing cyanosis edema  She has good capillary refill in the left upper extremity which is casted and good capillary refill in the right lower extremity which is casted    Discharge instructions/Information to patient and family:   See after visit summary for information provided to patient and family  Provisions for Follow-Up Care:  See after visit summary for information related to follow-up care and any pertinent home health orders        Disposition:     Acute Rehab at [facility]    For Discharges to   [address] SNF:   · Not Applicable to this Patient - Not Applicable to this Patient    Planned Readmission: none Discharge Statement:  I spent 20 minutes discharging the patient  This time was spent on the day of discharge  I had direct contact with the patient on the day of discharge  Greater than 50% of the total time was spent examining patient, answering all patient questions, arranging and discussing plan of care with patient as well as directly providing post-discharge instructions  Additional time then spent on discharge activities  Discharge Medications:  See after visit summary for reconciled discharge medications provided to patient and family        ** Please Note: This note has been constructed using a voice recognition system **

## 2018-03-14 NOTE — PLAN OF CARE
Problem: PHYSICAL THERAPY ADULT  Goal: Performs mobility at highest level of function for planned discharge setting  See evaluation for individualized goals  Treatment/Interventions: Functional transfer training, LE strengthening/ROM, Therapeutic exercise, Endurance training, Patient/family training, Equipment eval/education, Bed mobility, Compensatory technique education, Continued evaluation, Spoke to nursing, Spoke to case management, OT, Family  Equipment Recommended:  (TBD at [facility])       See flowsheet documentation for full assessment, interventions and recommendations  Outcome: Progressing  Prognosis: Good  Problem List: Decreased strength, Decreased endurance, Impaired balance, Decreased mobility, Decreased safety awareness, Decreased skin integrity, Orthopedic restrictions, Pain  Assessment: Pt seen for PT treatment session this date with interventions consisting of Therapeutic exercise consisting of: AROM 5 B LE in sitting position and therapeutic activity consisting of training: sit<>stand transfers, static standing tolerance for 15 sec w/ R UE support and vc and tactile cues for static standing posture faciliation  Pt agreeable to PT treatment session upon arrival, pt found seated OOB in recliner, in no apparent distress, A&O x 4 and responsive  In comparison to previous session, pt with improvements in initiation of standing trial of 1 attempt, pt able to maintain NWB of R % of the time, platform support of LUE; pt able to initiate B LE Lila in pain free range  Pt w/ fair carryover for HEP, verbalizing understanding  PT to assess carryover next session  and Post session: pt returned back to recliner, chair alarm engaged, all needs in reach and RN notified of session findings/recommendations  Continue to recommend STR at time of d/c in order to maximize pt's functional independence and safety w/ mobility   Pt continues to be functioning below baseline level, and remains limited 2* factors listed above  PT will continue to see pt while here in order to address the deficits listed above and provide interventions consistent w/ POC in effort to achieve STGs  Barriers to Discharge: Inaccessible home environment, Decreased caregiver support  Barriers to Discharge Comments: MD to clarify if platform allowed to LUE prior to trialing, PT will maintain NWB to entire LUE currently  Recommendation: Short-term skilled PT, Rehab consult     PT - OK to Discharge: Yes (when medically cleared if to STR)    See flowsheet documentation for full assessment

## 2018-03-14 NOTE — PROGRESS NOTES
Progress Note - Orthopedics   Luis Armando De Los Santos [de-identified] y o  female MRN: [id number]  Unit/Bed#: -01 Encounter: [id number]    Assessment:  1  POD 2 s/p ORIF right ankle  2  Left distal radius fracture treated non-operatively with Dr Tanisha Philip  Plan:  1  Pain control per medical team  2  NWB right lower extremity   3  Platform weight bearing left upper extremity should be ok with this type of fracture to help with mobilization in setting of concurrent ankle fracture  4  DVT prophylaxis - back on coumadin  5  Re-wrapped ace left upper extremity, wrist   extremity   5  Will follow up in office with Dr [facility] next week    Weight bearing: as above    Subjective Feeling better today  Feels like wrist cast slightly loose  Denies any numbness in fingers left hand and right foot  Vitals: Blood pressure 142/67, pulse 56, temperature 98 °F (36 7 °C), temperature source Oral, resp  rate 18, height 5' 4" (1 626 m), weight 98 4 kg (216 lb 14 9 oz), SpO2 91 %  ,Body mass index is 37 24 kg/m²  Intake/Output Summary (Last 24 hours) at 03/14/18 0923  Last data filed at 03/14/18 0300   Gross per 24 hour   Intake              210 ml   Output             1100 ml   Net             -890 ml       Invasive Devices     Peripheral Intravenous Line            Peripheral IV 03/12/18 Right Hand 1 day                Physical Exam: alert, no distress    Ortho Exam:   Left upper extremity: Cast clean and dry  Cast was bivalved and wrapped in ace  Digits warm and well-perfused  Able to flex and extend all digits  Sensation intact to light touch in all distal nerve distributions  Right ankle: splint clean and intact  Toes warm and well-perfused  Able to flex and extend all toes   Sensation intact to light touch in all distal nerve distributions

## 2018-03-14 NOTE — PROGRESS NOTES
Progress Note - Cheryl Charles 1935, [de-identified] y o  female MRN: [id number]    Unit/Bed#: -01 Encounter: [id number]    Primary Care Provider: Su Wang DO   Date and time admitted to hospital: 3/8/2018  8:01 AM        * Bimalleolar fracture of right ankle   Assessment & Plan    Post op day #1  Continue supportive care, pain medications  Nonweightbearing to right lower extremity   - P T /OT evaluation with consideration for Good [place]        Fracture of distal end of left radius   Assessment & Plan    Continue supportive care and pain management  Treatment as per Ortho recommending platform use with pt  Atrial fibrillation ([address] )   Assessment & Plan    · Stable  Coumadin restarted patient reports she takes coumadin 1 mg every day but Friday when she takes 2 mg  If not therapeutic in am will ask ortho if I can use bridging lovenox or heparin drip  Patient received 1 dose of oral vitamin K 2 5 mg on 3/10/18, INR down to 1 2  May take some time to get back to therapeutic  ·  Continue home dose metoprolol for rate control  CKD (chronic kidney disease), stage III   Assessment & Plan    Creatinine at baseline, monitor        Hypertension   Assessment & Plan    Blood pressure acceptable  Continue home medications amlodipine, and metoprolol  Patient brought her med list back in she state she only takes the lasix as needed for leg swelling and asks about her benazepril which was not ordered  Renal function within normal limits can restart ACE  Leucocytosis   Assessment & Plan    Likely reactive  Resolved  Monitor CBC and temp all have been normal         Hyperlipidemia   Assessment & Plan    Continue statin        Constipation   Assessment & Plan    Resolved  Continue bowel regimen p r n  Post-surgical hypothyroidism   Assessment & Plan    Continue home dose of levothyroxine    TSH normal        Nocturnal hypoxia   Assessment & Plan    Patient had 1 episode of oxygen sats dropping to 84%  during sleep which spontaneously resolved  May have a component of sleep apnea  Will need outpatient evaluation after discharge  Continue to monitor            VTE Pharmacologic Prophylaxis:   Pharmacologic: Warfarin (Coumadin)  Mechanical: Mechanical VTE prophylaxis in place  Patient Centered Rounds: I have evaluated patient without nursing staff present due to discussed with after my visit  Discussions with Specialists or Other Care Team Provider: reviewed ortho note  Education and Discussions with Family / Patient: updated patient, she is going to try to get in touch with her sister to help her with her affairs at home and her spouse  Time Spent for Care: 30 minutes  More than 50% of total time spent on counseling and coordination of care as described above  Current Length of Stay: 5 day(s)  Current Patient Status: Inpatient   Certification Statement: The patient will continue to require additional inpatient hospital stay due to will need rehab stay and eval     Discharge Plan: TBD   Code Status: Level 3 - DNAR and DNI    Subjective:   Patient states she is very concerned about getting back to work to supplement her income  Objective:   Vitals:   Temp (24hrs), Av 6 °F (37 °C), Min:97 8 °F (36 6 °C), Max:99 7 °F (37 6 °C)    HR:  [56-66] 56  Resp:  [16-18] 18  BP: (124-159)/(61-88) 134/61  SpO2:  [90 %-96 %] 90 %  Body mass index is 37 24 kg/m²  Input and Output Summary (last 24 hours): Intake/Output Summary (Last 24 hours) at 18 2342  Last data filed at 18 1128   Gross per 24 hour   Intake             1230 ml   Output             1200 ml   Net               30 ml       Physical Exam:     Physical Exam   Constitutional: She is oriented to person, place, and time  She appears well-developed  No distress  Moderately nourished, pale but  No acute distress   HENT:   Head: Normocephalic and atraumatic     Right Ear: External ear normal    Left Ear: External ear normal    Nose: Nose normal    Mouth/Throat: Oropharynx is clear and moist  No oropharyngeal exudate  Eyes: Conjunctivae and EOM are normal  Pupils are equal, round, and reactive to light  Right eye exhibits no discharge  Left eye exhibits no discharge  No scleral icterus  Neck: Normal range of motion  Neck supple  No JVD present  No thyromegaly present  Cardiovascular: Normal rate, regular rhythm, normal heart sounds and intact distal pulses  Exam reveals no gallop and no friction rub  No murmur heard  Pulmonary/Chest: Breath sounds normal  No respiratory distress  She has no wheezes  She has no rales  Abdominal: Soft  Bowel sounds are normal  She exhibits no distension  There is no tenderness  There is no rebound and no guarding  Musculoskeletal: Normal range of motion  She exhibits no edema or deformity  Right lower extremity casted and wrapped good cap refil  Left arm casted and wrapped good cap refil   Lymphadenopathy:     She has no cervical adenopathy  Neurological: She is alert and oriented to person, place, and time  She has normal reflexes  No cranial nerve deficit  She exhibits normal muscle tone  Skin: Skin is warm and dry  No rash noted  She is not diaphoretic  No erythema  Psychiatric: She has a normal mood and affect  Vitals reviewed  Additional Data:   Labs:    Results from last 7 days  Lab Units 03/09/18  0539 03/08/18  1006   WBC Thousand/uL 6 92 11 48*   HEMOGLOBIN g/dL 12 0 13 1   HEMATOCRIT % 36 8 40 5   PLATELETS Thousands/uL 193 223   NEUTROS PCT %  --  86*   LYMPHS PCT %  --  6*   MONOS PCT %  --  7   EOS PCT %  --  0       Results from last 7 days  Lab Units 03/09/18  0539   SODIUM mmol/L 139   POTASSIUM mmol/L 3 8   CHLORIDE mmol/L 103   CO2 mmol/L 27   BUN mg/dL 17   CREATININE mg/dL 1 06   CALCIUM mg/dL 8 6   GLUCOSE RANDOM mg/dL 96       Results from last 7 days  Lab Units 03/11/18  0433   INR  1 23*       * I Have Reviewed All Lab Data Listed Above    * Additional Pertinent Lab Tests Reviewed: All Labs Within Last 24 Hours Reviewed    Imaging:    Imaging Reports Reviewed Today Include: none    Cultures:   Blood Culture: No results found for: BLOODCX  Urine Culture: No results found for: URINECX  Sputum Culture: No components found for: SPUTUMCX  Wound Culture: No results found for: WOUNDCULT    Last 24 Hours Medication List:     Current Facility-Administered Medications:  acetaminophen 650 mg Oral Q6H PRN Rose Bishop MD   amLODIPine 5 mg Oral Daily Rose Bishop MD   aspirin 81 mg Oral Daily Rose Bishop MD   atorvastatin 10 mg Oral Daily With Bailey Pacheco MD   benazepril 20 mg Oral Daily Gin Ponce MD   docusate sodium 100 mg Oral BID Rose Bishop MD   levothyroxine 88 mcg Oral Early Morning Rose Bisohp MD   magnesium hydroxide 30 mL Oral BID PRN Rose Bishop MD   metoprolol succinate 100 mg Oral Daily Rose Bishop MD   morphine injection 2 mg Intravenous Q4H PRN Rose Bishop MD   multivitamin-minerals 1 tablet Oral Daily Rose Bishop MD   ondansetron 4 mg Intravenous Q6H PRN Rose Bishop MD   senna 1 tablet Oral BID Rose Bishop MD   traMADol 50 mg Oral Q6H PRN Rose Bishop MD   warfarin 1 mg Oral Daily (warfarin) Austin Hinton MD        Today, Patient Was Seen By: Gin Ponce MD    ** Please Note: Dragon 360 Dictation voice to text software may have been used in the creation of this document   **

## 2018-03-14 NOTE — ASSESSMENT & PLAN NOTE
Blood pressure acceptable  Continue home medications amlodipine, and metoprolol  Patient brought her med list back in she state she only takes the lasix as needed for leg swelling and asks about her benazepril which was not ordered  Renal function within normal limits can restart ACE

## 2018-03-14 NOTE — OCCUPATIONAL THERAPY NOTE
Occupational Therapy Treatment Note      Kain Joseph    3/14/2018    Patient Active Problem List   Diagnosis    Bimalleolar fracture of right ankle    Atrial fibrillation ([address] )    Fracture of distal end of left radius    Hypertension    Hyperlipidemia    Post-surgical hypothyroidism    CKD (chronic kidney disease), stage III    Leucocytosis    Ankle fracture, bimalleolar, closed, right, initial encounter    Constipation    Nocturnal hypoxia       Past Medical History:   Diagnosis Date    Atrial fibrillation ([address] )     Disease of thyroid gland     Hyperlipidemia     Hypertension        Past Surgical History:   Procedure Laterality Date    ORIF TIBIA & FIBULA FRACTURES Right 3/12/2018    Procedure: OPEN REDUCTION W/ INTERNAL FIXATION (ORIF) ANKLE  Open reduction internal fixation lateral malleolus with possible fixation of medial malleolus;  Surgeon: Ritu Whitfield MD;  Location: [facility];  Service: Orthopedics    TOTAL THYROIDECTOMY          03/14/18 1241   Restrictions/Precautions   Weight Bearing Precautions Per Order Yes   LUE Weight Bearing Per Order NWB   RLE Weight Bearing Per Order NWB   Braces or Orthoses (R ankle, L wrist splint)   Lifestyle   Autonomy Patient reporting independent with ADLs/ IADLs, ambulatory with [facility], patient drives and works part time 3-4 days per week  Patient lives with spouse in a 2 story ([email or web address]), 1st floor set up  with 1+1+1 YURY with no railing  Reciprocal Relationships Patient has a sister that lives in [place], that may be coming in to help, patient is the primary caretaker for her   Pain Assessment   Pain Assessment 0-10   Pain Score 5   Pain Type Surgical pain   Pain Location Ankle   Pain Orientation Right   Cognition   Overall Cognitive Status WFL   Arousal/Participation Alert; Responsive; Cooperative   Attention Within functional limits   Orientation Level Oriented X4   Memory Within functional limits   Following Commands Follows all commands and directions without difficulty   Comments agreeable to OT session   Assessment   Assessment Pt participated in skilled OT session today addressing the following interventions Patient / Family Education, Safety Awareness, Fall Prevention, DME training, Back safety education & training  Patient agreeable to OT treatment session, upon arrival patient was found alert, responsive  and in no apparent distress  Pt seemed depression and tearful during OT session  In comparison to previous session, patient with improvements in knowledge of DME and AE available to her  OT, pt, and spouse had extensive educational discussion on safety, fall prevention, DME teaching, and safety  Patient demonstrated ability to participate in daily activity with increase in functional ind and pt demonstrated significant interest in getting up and moving, stating at one point "I just want to get back to work " Patient continues to be functioning below baseline level, occupational performance remains limited secondary to factors listed above and increased risk for falls and injury  From OT standpoint, recommendation at time of d/c would be STR  Patient to benefit from continued Occupational Therapy treatment while in the hospital to address deficits as defined above and maximize level of functional independence with ADLs and functional mobility  Plan   Treatment Interventions Patient/family training;Equipment evaluation/education; Compensatory technique education; Energy conservation; Activityengagement;Continued evaluation   Goal Expiration Date 03/27/18   Treatment Day 1   OT Frequency 3-5x/wk   Recommendation   Recommendation Physiatry Consult   OT Discharge Recommendation Short Term Rehab   Equipment Recommended Bedside commode;Tub seat with back   OT - OK to Discharge Yes  (when medically cleared and agreeable to STR)   Barthel Index   Feeding 10   Bathing 0   Grooming Score 0   Dressing Score 5   Bladder Score 10   Bowels Score 10   Toilet Use Score 5   Transfers (Bed/Chair) Score 5   Mobility (Level Surface) Score 0   Stairs Score 0   Barthel Index Score 45   Paola Rothman MS OTR/L

## 2018-03-14 NOTE — PLAN OF CARE
Problem: DISCHARGE PLANNING - CARE MANAGEMENT  Goal: Discharge to post-acute care or home with appropriate resources  INTERVENTIONS:  - Conduct assessment to determine patient/family and health care team treatment goals, and need for post-acute services based on payer coverage, community resources, and patient preferences, and barriers to discharge  - Address psychosocial, clinical, and financial barriers to discharge as identified in assessment in conjunction with the patient/family and health care team  - Arrange appropriate level of post-acute services according to patient's   needs and preference and payer coverage in collaboration with the physician and health care team  - Communicate with and update the patient/family, physician, and health care team regarding progress on the discharge plan  - Arrange appropriate transportation to post-acute venues    Outcome: Completed Date Met: 18  Cm met with pt and  at bedside  Pt states that her anxiety is at a high level at the present time  She is worried about hospital bills and worried that she will not be accepted at [facility]  KIM called Lorinda Apgar from [facility] and was told that pt has Medicare and a secondary insurance, so there should be no problem with the hospital bill  CM also contacted Alanna from [facility] and pt has been accepted  She can be transported to [facility] at 15:00  Dr Dick Mauricio and nurse notified  Pt is feeling much better since finding these things out  IMM reviewed and signed  Copy to pt and copy to MR for scanning

## 2018-03-14 NOTE — ASSESSMENT & PLAN NOTE
Continue supportive care and pain management  Treatment as per Ortho recommending platform use with pt

## 2018-03-14 NOTE — ASSESSMENT & PLAN NOTE
Post op day #1  Continue supportive care, pain medications    Nonweightbearing to right lower extremity   - P T /OT evaluation with consideration for Good [place]

## 2018-03-14 NOTE — SOCIAL WORK
Cm met with pt and  at bedside  Pt states that her anxiety is at a high level at the present time  She is worried about hospital bills and worried that she will not be accepted at [facility]  KIM called Ana Connors from [facility] and was told that pt has Medicare and a secondary insurance, so there should be no problem with the hospital bill  CM also contacted Alanna from [facility] and pt has been accepted  She can be transported to [facility] at 15:00  Dr Gisela Mack and nurse notified  Pt is feeling much better since finding these things out  IMM reviewed and signed  Copy to pt and copy to MR for scanning

## 2018-03-15 LAB
ALBUMIN SERPL BCP-MCNC: 2.3 G/DL (ref 3.5–5)
ALP SERPL-CCNC: 58 U/L (ref 46–116)
ALT SERPL W P-5'-P-CCNC: 11 U/L (ref 12–78)
ANION GAP SERPL CALCULATED.3IONS-SCNC: 4 MMOL/L (ref 4–13)
AST SERPL W P-5'-P-CCNC: 27 U/L (ref 5–45)
BASOPHILS # BLD AUTO: 0.07 THOUSANDS/ΜL (ref 0–0.1)
BASOPHILS NFR BLD AUTO: 1 % (ref 0–1)
BILIRUB SERPL-MCNC: 0.6 MG/DL (ref 0.2–1)
BUN SERPL-MCNC: 18 MG/DL (ref 5–25)
CALCIUM SERPL-MCNC: 8.4 MG/DL (ref 8.3–10.1)
CHLORIDE SERPL-SCNC: 103 MMOL/L (ref 100–108)
CO2 SERPL-SCNC: 30 MMOL/L (ref 21–32)
CREAT SERPL-MCNC: 0.83 MG/DL (ref 0.6–1.3)
EOSINOPHIL # BLD AUTO: 0.38 THOUSAND/ΜL (ref 0–0.61)
EOSINOPHIL NFR BLD AUTO: 5 % (ref 0–6)
ERYTHROCYTE [DISTWIDTH] IN BLOOD BY AUTOMATED COUNT: 14.2 % (ref 11.6–15.1)
GFR SERPL CREATININE-BSD FRML MDRD: 67 ML/MIN/1.73SQ M
GLUCOSE P FAST SERPL-MCNC: 90 MG/DL (ref 65–99)
GLUCOSE SERPL-MCNC: 90 MG/DL (ref 65–140)
HCT VFR BLD AUTO: 32.2 % (ref 34.8–46.1)
HGB BLD-MCNC: 10.3 G/DL (ref 11.5–15.4)
INR PPP: 1.43 (ref 0.86–1.16)
LYMPHOCYTES # BLD AUTO: 1.45 THOUSANDS/ΜL (ref 0.6–4.47)
LYMPHOCYTES NFR BLD AUTO: 20 % (ref 14–44)
MCH RBC QN AUTO: 30.4 PG (ref 26.8–34.3)
MCHC RBC AUTO-ENTMCNC: 32 G/DL (ref 31.4–37.4)
MCV RBC AUTO: 95 FL (ref 82–98)
MONOCYTES # BLD AUTO: 0.85 THOUSAND/ΜL (ref 0.17–1.22)
MONOCYTES NFR BLD AUTO: 12 % (ref 4–12)
NEUTROPHILS # BLD AUTO: 4.6 THOUSANDS/ΜL (ref 1.85–7.62)
NEUTS SEG NFR BLD AUTO: 62 % (ref 43–75)
NRBC BLD AUTO-RTO: 0 /100 WBCS
PLATELET # BLD AUTO: 246 THOUSANDS/UL (ref 149–390)
PMV BLD AUTO: 9.7 FL (ref 8.9–12.7)
POTASSIUM SERPL-SCNC: 4.2 MMOL/L (ref 3.5–5.3)
PREALB SERPL-MCNC: 14 MG/DL (ref 18–40)
PROT SERPL-MCNC: 5.7 G/DL (ref 6.4–8.2)
PROTHROMBIN TIME: 17.8 SECONDS (ref 12.1–14.4)
RBC # BLD AUTO: 3.39 MILLION/UL (ref 3.81–5.12)
SODIUM SERPL-SCNC: 137 MMOL/L (ref 136–145)
WBC # BLD AUTO: 7.39 THOUSAND/UL (ref 4.31–10.16)

## 2018-03-15 PROCEDURE — 80053 COMPREHEN METABOLIC PANEL: CPT | Performed by: PHYSICAL MEDICINE & REHABILITATION

## 2018-03-15 PROCEDURE — 84134 ASSAY OF PREALBUMIN: CPT | Performed by: PHYSICAL MEDICINE & REHABILITATION

## 2018-03-15 PROCEDURE — 85610 PROTHROMBIN TIME: CPT | Performed by: PHYSICAL MEDICINE & REHABILITATION

## 2018-03-15 PROCEDURE — 85025 COMPLETE CBC W/AUTO DIFF WBC: CPT | Performed by: PHYSICAL MEDICINE & REHABILITATION

## 2018-03-16 ENCOUNTER — TELEPHONE (OUTPATIENT)
Dept: OBGYN CLINIC | Facility: HOSPITAL | Age: 83
End: 2018-03-16

## 2018-03-16 LAB
INR PPP: 1.73 (ref 0.86–1.16)
PROTHROMBIN TIME: 20.7 SECONDS (ref 12.1–14.4)

## 2018-03-16 PROCEDURE — 85610 PROTHROMBIN TIME: CPT | Performed by: PHYSICAL MEDICINE & REHABILITATION

## 2018-03-16 NOTE — TELEPHONE ENCOUNTER
Dr Carletta Boeck patient - R ankle ORIF 3/12    Patient will be scheduled for patient for 2 week post op suture removal   Wound care would be a dry sterile dressing change daily and as needed  Please advise?

## 2018-03-16 NOTE — TELEPHONE ENCOUNTER
Called Lilliam from [facility] to let her know that the left wrist has a cast which was bivalved  There is no wound she can just leave that just the way it is  The right ankle is in a splint and can be left alone until she sees us for her 1st postop within two weeks  Only change it if it is visibly soiled      [facility]

## 2018-03-16 NOTE — TELEPHONE ENCOUNTER
Patient had surgery on 03/12 with Dr Madhu Vela on her ankle  [facility] calling to see about wound care instructions for the patient and also when Dr Madhu Vela would like to have the patient scheduled for a post op appointment

## 2018-03-17 LAB
INR PPP: 2.17 (ref 0.86–1.16)
PROTHROMBIN TIME: 24.8 SECONDS (ref 12.1–14.4)

## 2018-03-17 PROCEDURE — 85610 PROTHROMBIN TIME: CPT | Performed by: PHYSICAL MEDICINE & REHABILITATION

## 2018-03-19 LAB
ANION GAP SERPL CALCULATED.3IONS-SCNC: 6 MMOL/L (ref 4–13)
BASOPHILS # BLD AUTO: 0.06 THOUSANDS/ΜL (ref 0–0.1)
BASOPHILS NFR BLD AUTO: 1 % (ref 0–1)
BUN SERPL-MCNC: 16 MG/DL (ref 5–25)
CALCIUM SERPL-MCNC: 8.5 MG/DL (ref 8.3–10.1)
CHLORIDE SERPL-SCNC: 100 MMOL/L (ref 100–108)
CO2 SERPL-SCNC: 29 MMOL/L (ref 21–32)
CREAT SERPL-MCNC: 0.87 MG/DL (ref 0.6–1.3)
EOSINOPHIL # BLD AUTO: 0.36 THOUSAND/ΜL (ref 0–0.61)
EOSINOPHIL NFR BLD AUTO: 4 % (ref 0–6)
ERYTHROCYTE [DISTWIDTH] IN BLOOD BY AUTOMATED COUNT: 14.4 % (ref 11.6–15.1)
GFR SERPL CREATININE-BSD FRML MDRD: 63 ML/MIN/1.73SQ M
GLUCOSE P FAST SERPL-MCNC: 95 MG/DL (ref 65–99)
GLUCOSE SERPL-MCNC: 95 MG/DL (ref 65–140)
HCT VFR BLD AUTO: 28.7 % (ref 34.8–46.1)
HGB BLD-MCNC: 9.1 G/DL (ref 11.5–15.4)
INR PPP: 2.54 (ref 0.86–1.16)
LYMPHOCYTES # BLD AUTO: 1.17 THOUSANDS/ΜL (ref 0.6–4.47)
LYMPHOCYTES NFR BLD AUTO: 14 % (ref 14–44)
MCH RBC QN AUTO: 30.1 PG (ref 26.8–34.3)
MCHC RBC AUTO-ENTMCNC: 31.7 G/DL (ref 31.4–37.4)
MCV RBC AUTO: 95 FL (ref 82–98)
MONOCYTES # BLD AUTO: 0.83 THOUSAND/ΜL (ref 0.17–1.22)
MONOCYTES NFR BLD AUTO: 10 % (ref 4–12)
NEUTROPHILS # BLD AUTO: 5.78 THOUSANDS/ΜL (ref 1.85–7.62)
NEUTS SEG NFR BLD AUTO: 70 % (ref 43–75)
NRBC BLD AUTO-RTO: 0 /100 WBCS
PLATELET # BLD AUTO: 348 THOUSANDS/UL (ref 149–390)
PMV BLD AUTO: 9.4 FL (ref 8.9–12.7)
POTASSIUM SERPL-SCNC: 4.5 MMOL/L (ref 3.5–5.3)
PROTHROMBIN TIME: 28.1 SECONDS (ref 12.1–14.4)
RBC # BLD AUTO: 3.02 MILLION/UL (ref 3.81–5.12)
SODIUM SERPL-SCNC: 135 MMOL/L (ref 136–145)
WBC # BLD AUTO: 8.25 THOUSAND/UL (ref 4.31–10.16)

## 2018-03-19 PROCEDURE — 85610 PROTHROMBIN TIME: CPT | Performed by: PHYSICAL MEDICINE & REHABILITATION

## 2018-03-19 PROCEDURE — 85025 COMPLETE CBC W/AUTO DIFF WBC: CPT | Performed by: PHYSICAL MEDICINE & REHABILITATION

## 2018-03-19 PROCEDURE — 80048 BASIC METABOLIC PNL TOTAL CA: CPT | Performed by: PHYSICAL MEDICINE & REHABILITATION

## 2018-03-22 LAB
INR PPP: 2.24 (ref 0.86–1.16)
PROTHROMBIN TIME: 25.4 SECONDS (ref 12.1–14.4)

## 2018-03-22 PROCEDURE — 85610 PROTHROMBIN TIME: CPT | Performed by: NURSE PRACTITIONER

## 2018-03-26 LAB
ANION GAP SERPL CALCULATED.3IONS-SCNC: 8 MMOL/L (ref 4–13)
BASOPHILS # BLD AUTO: 0.1 THOUSANDS/ΜL (ref 0–0.1)
BASOPHILS NFR BLD AUTO: 1 % (ref 0–1)
BUN SERPL-MCNC: 17 MG/DL (ref 5–25)
CALCIUM SERPL-MCNC: 8.6 MG/DL (ref 8.3–10.1)
CHLORIDE SERPL-SCNC: 100 MMOL/L (ref 100–108)
CO2 SERPL-SCNC: 29 MMOL/L (ref 21–32)
CREAT SERPL-MCNC: 0.92 MG/DL (ref 0.6–1.3)
EOSINOPHIL # BLD AUTO: 0.27 THOUSAND/ΜL (ref 0–0.61)
EOSINOPHIL NFR BLD AUTO: 4 % (ref 0–6)
ERYTHROCYTE [DISTWIDTH] IN BLOOD BY AUTOMATED COUNT: 15.3 % (ref 11.6–15.1)
GFR SERPL CREATININE-BSD FRML MDRD: 59 ML/MIN/1.73SQ M
GLUCOSE P FAST SERPL-MCNC: 95 MG/DL (ref 65–99)
GLUCOSE SERPL-MCNC: 95 MG/DL (ref 65–140)
HCT VFR BLD AUTO: 33.1 % (ref 34.8–46.1)
HGB BLD-MCNC: 10.3 G/DL (ref 11.5–15.4)
INR PPP: 2.13 (ref 0.86–1.16)
LYMPHOCYTES # BLD AUTO: 1.41 THOUSANDS/ΜL (ref 0.6–4.47)
LYMPHOCYTES NFR BLD AUTO: 19 % (ref 14–44)
MCH RBC QN AUTO: 29.9 PG (ref 26.8–34.3)
MCHC RBC AUTO-ENTMCNC: 31.1 G/DL (ref 31.4–37.4)
MCV RBC AUTO: 96 FL (ref 82–98)
MONOCYTES # BLD AUTO: 0.8 THOUSAND/ΜL (ref 0.17–1.22)
MONOCYTES NFR BLD AUTO: 11 % (ref 4–12)
NEUTROPHILS # BLD AUTO: 5.01 THOUSANDS/ΜL (ref 1.85–7.62)
NEUTS SEG NFR BLD AUTO: 66 % (ref 43–75)
NRBC BLD AUTO-RTO: 0 /100 WBCS
PLATELET # BLD AUTO: 496 THOUSANDS/UL (ref 149–390)
PMV BLD AUTO: 8.8 FL (ref 8.9–12.7)
POTASSIUM SERPL-SCNC: 4.3 MMOL/L (ref 3.5–5.3)
PROTHROMBIN TIME: 24.4 SECONDS (ref 12.1–14.4)
RBC # BLD AUTO: 3.45 MILLION/UL (ref 3.81–5.12)
SODIUM SERPL-SCNC: 137 MMOL/L (ref 136–145)
WBC # BLD AUTO: 7.62 THOUSAND/UL (ref 4.31–10.16)

## 2018-03-26 PROCEDURE — 85610 PROTHROMBIN TIME: CPT | Performed by: NURSE PRACTITIONER

## 2018-03-26 PROCEDURE — 85025 COMPLETE CBC W/AUTO DIFF WBC: CPT | Performed by: PHYSICAL MEDICINE & REHABILITATION

## 2018-03-26 PROCEDURE — 80048 BASIC METABOLIC PNL TOTAL CA: CPT | Performed by: PHYSICAL MEDICINE & REHABILITATION

## 2018-03-26 NOTE — OP NOTE
OPERATIVE REPORT    Patient Name: Lebron Torre    :  1935  MRN: [id number]  Pt Location: [facility] ROOM 03    Surgery Date:   3/12/2018    Surgeon(s) and Role:     * Genaro Lino MD - Primary    Preop Diagnosis:  Ankle fracture, bimalleolar, closed, right, initial encounter [Z18 921F]  Post-Op Diagnosis Codes:     * Ankle fracture, bimalleolar, closed, right, initial encounter [S71 056Z]  Procedure(s) (LRB):  OPEN REDUCTION W/ INTERNAL FIXATION (ORIF) ANKLE  Open reduction internal fixation lateral malleolus with possible fixation of medial malleolus (Right)    Specimen(s):  * No specimens in log *    Estimated Blood Loss:   Minimal    Drains:  None     Implants:     Implant Name Type Inv  Item Serial No   Lot No  LRB No  Used   LOG [id number] - SYNTHES 3 5MM CANNULATED SCREW SET - 1          LOG [id number] - Rapport 4 0MM CANNULATED SCREW SET - 1          LOG 857212 - Rapport 4 5MM CANNULATED SCREW SET - 1          LOG 052368 - Rapport LOCKING SMALL FRAGMENT SET - 1          LOG 085830 - Rapport LOCKING DISTAL FIBULA SET - 1          GUIDEWIRE 1 25 X 150MM NON-THRD - [id number]  GUIDEWIRE 1 25 X 150MM NON-THRD  Synthes [id number] Right 1   SCREW LCK 3 5 X 14MM SLF TAP STRDRV RECES - [id number]  SCREW LCK 3 5 X 14MM SLF TAP STRDRV RECES 210 103 Synthes  Right 1   SCREW LCK 3 5 X 16MM SLF TAP STRDRV RECES - [id number]  SCREW LCK 3 5 X 16MM SLF TAP STRDRV RECES  Synthes 212 104 Right 1   SCREW CRTX 3 5 X 14MM SS SLF TAP - [id number]  SCREW CRTX 3 5 X 14MM SS SLF TAP  Synthes 204 814 Right 2   SCREW CRTX 3 5 X 24MM SLF TAP - [id number]  SCREW CRTX 3 5 X 24MM SLF TAP  Synthes 204 824 Right 1   SCREW MINO 4 X 50MM LNG THRD - [id number]  SCREW MINO 4 X 50MM LNG THRD  Synthes 207 750 Right 1   PLATE LCP 1/3 TUBL 12LC 6HL - [id number]   PLATE LCP 1/3 TUBL 71US 6HL   Synthes 241 361 Right 1       Anesthesia Type:   General    Operative Indications:   Ankle fracture, bimalleolar, closed, right, initial encounter [F99 124K]    Operative Findings:  Near anatomic reduction fixated with fibular plate and medial malleolus screw and pin  Complications:   None    Procedure and Technique:  Patient had the right ankle marked in the preoperative area  Risks and benefits of the procedure were discussed in detail with the patient and she wished to proceed  All questions answered  Ankle soft tissues were evaluated and skin wrinkles noted  Only mild to moderate residual swelling  Patient taken to the operating room  Placed on the operative room table in the supine position  General anesthesia administered  Prophylactic IV antibiotics administered  Tourniquet placed on patient's right thigh  Bump placed under the right buttock  Right leg ankle and foot prepped and draped in the usual sterile fashion  Time-out performed  Leg elevated and tourniquet elevated to 300 lb pressure  Incision made along the posterior aspect of fibula starting approximately 2 cm proximal to the fracture and carried down to the tip of the lateral malleolus  Sharp dissection utilized down to the fibula  Soft tissues elevated with periosteal elevator around the fracture site  Hematoma evacuated and fracture reduced  using bone reduction forceps  Fluoroscopic views confirmed reduction  6 hole 1/3 semi tubular plate (Synthes) placed along the posterior aspect of the fibula  Plate was fixated with  A lag screw placed from distal posterior to proximal anterior across the fracture site  And then 3 bicortical screws proximally and a  Locking screw distally  Good fixation achieved  We then made an anterior medial incision and dissected down to the tip of the medial malleolus and the small medial malleolar fracture fragment  This was reduced in pin and with 2 guidewires for the 4 0 cannulated screws    However I felt that the fracture fragment was not large enough to safely accommodate to 4 0 cannulated screws a sub the fracture was 1st fixated with a 4 0 cannulated screw and then the other guide pin was bent and impacted into the tip of the medial malleolus and fixation was provided by the combination of screw and pin  Final fluoro views AP lateral mortise revealed excellent reduction  Tourniquet was let down  Hemostasis obtained with Bovie  Wounds irrigated and closed in layers  Dry sterile dressing and a posterior and U splints applied  Patient tolerated the procedure well  No complications  She went to the recovery room in stable condition  I was present for the entire procedure    Patient Disposition:  PACU     SIGNATURE: Ritu Whitfield MD  DATE: March 25, 2018  TIME: 9:02 PM      Portions of the record may have been created with voice recognition software   Occasional wrong word or "sound a like" substitutions may have occurred due to the inherent limitations of voice recognition software   Read the chart carefully and recognize, using context, where substitutions have occurred

## 2018-03-27 ENCOUNTER — APPOINTMENT (OUTPATIENT)
Dept: RADIOLOGY | Facility: CLINIC | Age: 83
End: 2018-03-27

## 2018-03-27 ENCOUNTER — OFFICE VISIT (OUTPATIENT)
Dept: OBGYN CLINIC | Facility: CLINIC | Age: 83
End: 2018-03-27

## 2018-03-27 VITALS — HEART RATE: 78 BPM | DIASTOLIC BLOOD PRESSURE: 64 MMHG | SYSTOLIC BLOOD PRESSURE: 104 MMHG

## 2018-03-27 DIAGNOSIS — Z87.81 STATUS POST ORIF OF FRACTURE OF ANKLE: ICD-10-CM

## 2018-03-27 DIAGNOSIS — Z48.89 AFTERCARE FOLLOWING SURGERY: ICD-10-CM

## 2018-03-27 DIAGNOSIS — Z98.890 STATUS POST ORIF OF FRACTURE OF ANKLE: Primary | ICD-10-CM

## 2018-03-27 DIAGNOSIS — Z98.890 STATUS POST ORIF OF FRACTURE OF ANKLE: ICD-10-CM

## 2018-03-27 DIAGNOSIS — Z87.81 STATUS POST ORIF OF FRACTURE OF ANKLE: Primary | ICD-10-CM

## 2018-03-27 PROCEDURE — 73600 X-RAY EXAM OF ANKLE: CPT

## 2018-03-27 PROCEDURE — 99024 POSTOP FOLLOW-UP VISIT: CPT | Performed by: ORTHOPAEDIC SURGERY

## 2018-03-27 RX ORDER — GABAPENTIN 100 MG/1
100 CAPSULE ORAL 2 TIMES DAILY
COMMUNITY
End: 2018-05-31

## 2018-03-27 RX ORDER — TRAMADOL HYDROCHLORIDE 50 MG/1
50 TABLET ORAL EVERY 6 HOURS PRN
COMMUNITY
End: 2018-07-31

## 2018-03-27 RX ORDER — SENNA PLUS 8.6 MG/1
1 TABLET ORAL DAILY
COMMUNITY
End: 2018-04-24

## 2018-03-27 RX ORDER — POLYETHYLENE GLYCOL 3350 17 G/17G
17 POWDER, FOR SOLUTION ORAL DAILY
COMMUNITY
End: 2018-04-24

## 2018-03-27 RX ORDER — ACETAMINOPHEN 325 MG/1
325 TABLET ORAL EVERY 6 HOURS PRN
COMMUNITY

## 2018-03-27 RX ORDER — LIDOCAINE 40 MG/G
CREAM TOPICAL 2 TIMES DAILY
COMMUNITY
End: 2018-04-24

## 2018-03-27 NOTE — PROGRESS NOTES
CHIEF COMPLAINT:   Chief Complaint   Patient presents with    Right Ankle - Post-op         PROCEDURE: S/P ORIF right ankle March 12, 2018    SUBJECTIVE: Barbara Grullon is a [de-identified] y o  female is here for follow up  Patient currently residing at [facility]  She has been nonweightbearing right lower extremity  Denies any numbness or tingling lower extremity  Has some tenderness noted over the incision  ROS:   General: no fever, no chills  Respiratory:  No coughing, shortness of breath or wheezing  Cardiovascular:  No chest pain, no palpitations  Neurological:  No headaches, no confusion  Review of all other systems is negative    MEDS:   No current outpatient prescriptions on file  No current facility-administered medications for this visit  ALLERGIES: Patient has no known allergies  Vitals:    03/27/18 1525   BP: 104/64   Pulse: 78       PHYSICAL EXAM:    General Appearance:  Alert, cooperative, no distress, appears stated age; nonweightbearing right lower extremity   Lungs:   respirations unlabored   Chest Wall:  No tenderness or deformity   Heart:  Regular rate and rhythm   Extremities: Extremities normal, atraumatic, no cyanosis or edema   Pulses: 2+ and symmetric   Neurologic: Normal     ORTHO EXAM:  Examination of the right ankle  Splint removed medial and lateral incisions both look clean dry and intact with sutures  Sutures were removed today with no complications  No erythema noted around medial and lateral incisions  No evidence of infection  Range of motion and strength both deferred  Sensation intact light touch L1-S1 distributions     IMAGING:  X-rays taken today of the right ankle reviewed by Dr SANCHEZ ROOT [facility] shows hardware in place in acceptable position  ASSESSMENT/PLAN:   S/p 2 weeks above procedure  Meghan Lynn was seen today for post-op      Diagnoses and all orders for this visit:    Status post ORIF of fracture of ankle  -     XR ankle 2 vw right; Future    Aftercare following surgery  -     XR ankle 2 vw right; Future        There are no Patient Instructions on file for this visit  DISCUSSION SUMMARY:  Patient is S/P two weeks ORIF right ankle  Sutures removed medial and lateral malleolus with no complications noted Steri-Strips applied  Well-padded nonweightbearing short-leg cast was applied  Patient will remain nonweightbearing for total of six weeks  She will continue with physical therapy    Follow up in four weeks and have an x-ray of the right ankle out of the cast

## 2018-04-06 ENCOUNTER — OFFICE VISIT (OUTPATIENT)
Dept: OBGYN CLINIC | Facility: CLINIC | Age: 83
End: 2018-04-06

## 2018-04-06 ENCOUNTER — APPOINTMENT (OUTPATIENT)
Dept: RADIOLOGY | Facility: CLINIC | Age: 83
End: 2018-04-06
Payer: COMMERCIAL

## 2018-04-06 VITALS
HEIGHT: 64 IN | DIASTOLIC BLOOD PRESSURE: 79 MMHG | BODY MASS INDEX: 35.85 KG/M2 | HEART RATE: 56 BPM | SYSTOLIC BLOOD PRESSURE: 137 MMHG | WEIGHT: 210 LBS

## 2018-04-06 DIAGNOSIS — M25.632 STIFFNESS OF LEFT WRIST JOINT: ICD-10-CM

## 2018-04-06 DIAGNOSIS — S52.572D OTHER CLOSED INTRA-ARTICULAR FRACTURE OF DISTAL END OF LEFT RADIUS WITH ROUTINE HEALING, SUBSEQUENT ENCOUNTER: Primary | ICD-10-CM

## 2018-04-06 DIAGNOSIS — S52.572D OTHER CLOSED INTRA-ARTICULAR FRACTURE OF DISTAL END OF LEFT RADIUS WITH ROUTINE HEALING, SUBSEQUENT ENCOUNTER: ICD-10-CM

## 2018-04-06 DIAGNOSIS — R29.898 LEFT HAND WEAKNESS: ICD-10-CM

## 2018-04-06 PROCEDURE — 99024 POSTOP FOLLOW-UP VISIT: CPT | Performed by: FAMILY MEDICINE

## 2018-04-06 PROCEDURE — 73100 X-RAY EXAM OF WRIST: CPT

## 2018-04-06 NOTE — PROGRESS NOTES
Assessment/Plan:  Assessment/Plan   Diagnoses and all orders for this visit:    Other closed intra-articular fracture of distal end of left radius with routine healing, subsequent encounter  -     XR wrist 2 vw left; Future  -     Ambulatory referral to Occupational Therapy; Future  -     Cock Up Wrist Splint    Stiffness of left wrist joint  -     Ambulatory referral to Occupational Therapy; Future    Left hand weakness  -     Ambulatory referral to Occupational Therapy; Future      72-year-old right-hand-dominant female with left distal radius fracture  Discussed with patient physical exam, radiographs, impression, and plan  X-rays are noted for bony callus formation at the fracture site  Her physical exam is noted for stiffness of the wrist with restricted motion in wrist flexion, extension and radial and ulnar deviation  She is noted from mild tenderness at the distal radius and ulna  At this time I will transfer her to a cock-up wrist splint and she is to remain non weight-bearing on the left upper extremity  She is to wear the splint at all times except for hygiene  I will also make referral for occupational therapy which she is to start as soon as possible  She will follow up with me in 4 weeks at which point x-rays will be repeated and she will be re-evaluated  Subjective:   Patient ID: Darlene Chaudhary is a [de-identified] y o  female  Chief Complaint   Patient presents with    Left Wrist - Pain       [de-identified]year old right-hand-dominant female presents for evaluation of left wrist pain from fall injury at home on 03/01/2018  She was last seen on 03/05/2018 at which point she was placed in short arm cast for treatment of left distal radius fracture  Today with the cast removed she denies any pain of the wrist, or any pain with movement of the wrist, but she does report stiffness  She denies any numbness/tingling    She does report having had injury for her right ankle which was treated surgically by Dr Renee Castillo and he is following her for this  Wrist Pain   This is a new problem  The current episode started more than 1 month ago  The problem has been gradually improving  Associated symptoms include arthralgias  Pertinent negatives include no joint swelling  She has tried immobilization and rest for the symptoms  The treatment provided moderate relief  Review of Systems   Musculoskeletal: Positive for arthralgias  Negative for joint swelling  Objective:  Vitals:    04/06/18 0847   BP: 137/79   Pulse: 56   Weight: 95 3 kg (210 lb)   Height: 5' 4" (1 626 m)     Left Hand Exam     Muscle Strength   Wrist Extension: 4/5   Wrist Flexion: 4/5           Observations     Left Wrist/Hand   Negative for deformity  Palpation     Additional Palpation Details    Left: Normal radial pulse    Tenderness     Left Wrist/Hand   No tenderness in the scaphoid  Additional Tenderness Details    Left: Mild distal radius at dorsal and radial aspects, mild distal ulna dorsal aspect    Active Range of Motion     Left Wrist   Wrist flexion: 10 degrees   Wrist extension: 15 degrees     Additional Active Range of Motion Details    Left: Normal range of motion of fingers    Strength/Myotome Testing     Left Wrist/Hand   Wrist extension: 4  Wrist flexion: 4     (2nd hand position)     Trial 1: 4      Physical Exam   Constitutional: She is oriented to person, place, and time  She appears well-developed  No distress  HENT:   Head: Normocephalic  Eyes: Conjunctivae are normal    Neck: No tracheal deviation present  Pulmonary/Chest: Effort normal  No respiratory distress  Abdominal: She exhibits no distension  Musculoskeletal:        Left hand: She exhibits no deformity  Neurological: She is alert and oriented to person, place, and time  Skin: Skin is warm and dry  Psychiatric: She has a normal mood and affect   Her behavior is normal        I have personally reviewed pertinent films in PACS and my interpretation is Healing left distal radisu fracture with callus formation

## 2018-04-24 ENCOUNTER — APPOINTMENT (OUTPATIENT)
Dept: RADIOLOGY | Facility: CLINIC | Age: 83
End: 2018-04-24
Payer: COMMERCIAL

## 2018-04-24 ENCOUNTER — OFFICE VISIT (OUTPATIENT)
Dept: OBGYN CLINIC | Facility: CLINIC | Age: 83
End: 2018-04-24

## 2018-04-24 VITALS — SYSTOLIC BLOOD PRESSURE: 99 MMHG | HEART RATE: 96 BPM | DIASTOLIC BLOOD PRESSURE: 66 MMHG

## 2018-04-24 DIAGNOSIS — Z87.81 S/P ORIF (OPEN REDUCTION INTERNAL FIXATION) FRACTURE: ICD-10-CM

## 2018-04-24 DIAGNOSIS — Z98.890 S/P ORIF (OPEN REDUCTION INTERNAL FIXATION) FRACTURE: Primary | ICD-10-CM

## 2018-04-24 DIAGNOSIS — Z98.890 S/P ORIF (OPEN REDUCTION INTERNAL FIXATION) FRACTURE: ICD-10-CM

## 2018-04-24 DIAGNOSIS — Z87.81 S/P ORIF (OPEN REDUCTION INTERNAL FIXATION) FRACTURE: Primary | ICD-10-CM

## 2018-04-24 PROCEDURE — 73600 X-RAY EXAM OF ANKLE: CPT

## 2018-04-24 PROCEDURE — 99024 POSTOP FOLLOW-UP VISIT: CPT | Performed by: ORTHOPAEDIC SURGERY

## 2018-04-24 RX ORDER — LIDOCAINE 50 MG/G
1 PATCH TOPICAL DAILY
COMMUNITY
End: 2018-06-12

## 2018-04-24 RX ORDER — METHOCARBAMOL 500 MG/1
500 TABLET, FILM COATED ORAL 4 TIMES DAILY
COMMUNITY
End: 2018-05-31

## 2018-04-24 NOTE — PROGRESS NOTES
CHIEF COMPLAINT:   Chief Complaint   Patient presents with    Right Ankle - Post-op         PROCEDURE: S/P ORIF right ankle March 12, 2018    SUBJECTIVE: Chaz Snow is a [de-identified] y o  female is here for follow up  Patient has been nonweightbearing in a cast   Has no complaints of pain  Denies numbness or tingling  ROS:   General: no fever, no chills  Respiratory:  No coughing, shortness of breath or wheezing  Cardiovascular:  No chest pain, no palpitations  Neurological:  No headaches, no confusion  Review of all other systems is negative    MEDS:   Current Outpatient Prescriptions   Medication Sig Dispense Refill    acetaminophen (TYLENOL) 325 mg tablet Take 325 mg by mouth every 6 (six) hours as needed for mild pain      amLODIPine (NORVASC) 5 mg tablet Take 5 mg by mouth      aspirin 81 MG tablet Take by mouth      atorvastatin (LIPITOR) 10 mg tablet Take 10 mg by mouth      benazepril (LOTENSIN) 20 mg tablet Take 20 mg by mouth daily      gabapentin (NEURONTIN) 100 mg capsule Take 100 mg by mouth 2 (two) times a day      levothyroxine 88 mcg tablet Take 88 mcg by mouth      lidocaine (LIDODERM) 5 % Place 1 patch on the skin daily Remove & Discard patch within 12 hours or as directed by MD      Magnesium Hydroxide (MILK OF MAGNESIA PO) Take by mouth      methocarbamol (ROBAXIN) 500 mg tablet Take 500 mg by mouth 4 (four) times a day      metoprolol succinate (TOPROL-XL) 100 mg 24 hr tablet Take 100 mg by mouth      Multiple Vitamins-Minerals (CERTAVITE SENIOR/ANTIOXIDANT PO) Take by mouth      traMADol (ULTRAM) 50 mg tablet Take 50 mg by mouth every 6 (six) hours as needed for moderate pain      warfarin (COUMADIN) 2 5 mg tablet Take 2 5 mg daily at dinner until changed by your physician adjust your dose 30 tablet 0     No current facility-administered medications for this visit  ALLERGIES: Patient has no known allergies        Vitals:    04/24/18 1334   BP: 99/66   Pulse: 96       PHYSICAL EXAM:    General Appearance:  Alert, cooperative, no distress, appears stated age; nonweightbearing right lower extremity   Lungs:   respirations unlabored   Chest Wall:  No tenderness or deformity   Heart:  Regular rate and rhythm   Extremities: Extremities normal, atraumatic, no cyanosis or edema   Pulses: 2+ and symmetric   Neurologic: Normal     ORTHO EXAM:  Examination of the right ankle  Medial and lateral incisions both well-healed with no drainage  No erythema noted around medial and lateral incisions  No evidence of infection  Range of motion and strength both deferred  Sensation intact light touch L1-S1 distributions     IMAGING:  X-rays taken 4/24/2018 of the right ankle reviewed by Dr Broderick Hoffman shows hardware in place in acceptable position  ASSESSMENT/PLAN:   S/p 6 weeks above procedure  Latyrese Hurt was seen today for post-op  Diagnoses and all orders for this visit:    S/P ORIF (open reduction internal fixation) fracture  -     XR ankle 2 vw right; Future        There are no Patient Instructions on file for this visit  DISCUSSION SUMMARY:  Patient is S/P 6 weeks ORIF right ankle  Cast removed with no complications noted  Patient will be fitted for long Cam boot  She will now be weight-bearing as tolerated with the boot on  Patient may take the boot off for shower or if sitting down  Patient will start physical therapy working on gentle range of motion   Follow up with us in four weeks and have an x-ray of the right ankle upon arrival

## 2018-05-03 ENCOUNTER — APPOINTMENT (OUTPATIENT)
Dept: RADIOLOGY | Facility: CLINIC | Age: 83
End: 2018-05-03
Payer: COMMERCIAL

## 2018-05-03 ENCOUNTER — OFFICE VISIT (OUTPATIENT)
Dept: OBGYN CLINIC | Facility: CLINIC | Age: 83
End: 2018-05-03

## 2018-05-03 VITALS
SYSTOLIC BLOOD PRESSURE: 95 MMHG | BODY MASS INDEX: 32.78 KG/M2 | HEIGHT: 64 IN | DIASTOLIC BLOOD PRESSURE: 65 MMHG | HEART RATE: 93 BPM | WEIGHT: 192 LBS

## 2018-05-03 DIAGNOSIS — S52.572D OTHER CLOSED INTRA-ARTICULAR FRACTURE OF DISTAL END OF LEFT RADIUS WITH ROUTINE HEALING, SUBSEQUENT ENCOUNTER: ICD-10-CM

## 2018-05-03 DIAGNOSIS — S52.572D OTHER CLOSED INTRA-ARTICULAR FRACTURE OF DISTAL END OF LEFT RADIUS WITH ROUTINE HEALING, SUBSEQUENT ENCOUNTER: Primary | ICD-10-CM

## 2018-05-03 DIAGNOSIS — M25.632 STIFFNESS OF LEFT WRIST JOINT: ICD-10-CM

## 2018-05-03 PROCEDURE — 99024 POSTOP FOLLOW-UP VISIT: CPT | Performed by: FAMILY MEDICINE

## 2018-05-03 PROCEDURE — 73100 X-RAY EXAM OF WRIST: CPT

## 2018-05-03 NOTE — PROGRESS NOTES
Assessment/Plan:  Assessment/Plan    Diagnoses and all orders for this visit:    Other closed intra-articular fracture of distal end of left radius with routine healing, subsequent encounter  -     XR wrist 2 vw left; Future    Stiffness of left wrist joint  -     XR wrist 2 vw left; Future     29-year-old right-hand-dominant female status post left distal radius fracture  Discussed with patient physical exam, radiographs, impression, and plan  Physical exam is unremarkable for any tenderness of the distal radius or wrist   She is noted to have stiffness with range of motion of extension limited to 30° and flexion to 30°  I recommend that she continue with therapy directed at improving range of motion and strength of the wrist  She is currently being treated by Dr Broderick Hoffman for right ankle fracture status post ORIF  I have advised that during physical therapy for her right lower extremity she may bear weight on the left wrist with use of walker  She will follow up with me in 4 weeks for reassessment of wrist strength and range of motion  Subjective:   Patient ID: Pj Lainez is a [de-identified] y o  female  Chief Complaint   Patient presents with    Left Wrist - Follow-up       29-year-old right-hand-dominant female following up for left distal radius fracture  She was last seen 4 weeks ago at which point she was transitioned to cock-up wrist splint referred to occupational therapy  Today she reports she has been doing therapy at rehab facility, however therapy has been limited to hand exercises but no wrist exercises  She has been wearing the wrist splint at most times with intermittently removing it to actively move the wrist   She denies any pain of the wrist but reports stiffness  She has not had new injury since her last visit  Wrist Pain   This is a new problem  The current episode started more than 1 month ago  The problem has been gradually improving   Pertinent negatives include no arthralgias (No pain of the wrist) or joint swelling ( no swelling of the wrist)  She has tried immobilization and rest for the symptoms  The treatment provided significant relief  Review of Systems   Musculoskeletal: Negative for arthralgias (No pain of the wrist) and joint swelling ( no swelling of the wrist)  Objective:  Vitals:    05/03/18 1050   BP: 95/65   Pulse: 93   Weight: 87 1 kg (192 lb)   Height: 5' 4" (1 626 m)     Left Hand Exam     Muscle Strength   Wrist Extension: 4+/5   Wrist Flexion: 4+/5           Observations     Left Wrist/Hand   Negative for deformity  Palpation     Additional Palpation Details  Left-normal radial pulse    Tenderness     Additional Tenderness Details  Left-no tenderness of distal radius or ulna  -no carpal tenderness    Active Range of Motion     Left Wrist   Wrist flexion: 30 degrees   Wrist extension: 30 degrees     Passive Range of Motion     Left Wrist   Wrist flexion: 35 degrees   Wrist extension: 40 degrees     Strength/Myotome Testing     Left Wrist/Hand   Wrist extension: 4+  Wrist flexion: 4+      Physical Exam   Constitutional: She is oriented to person, place, and time  She appears well-developed  No distress  HENT:   Head: Normocephalic  Eyes: Conjunctivae are normal    Neck: No tracheal deviation present  Cardiovascular: Normal rate  Pulmonary/Chest: Effort normal  No respiratory distress  Abdominal: She exhibits no distension  Musculoskeletal:        Left hand: She exhibits no deformity  Neurological: She is alert and oriented to person, place, and time  Skin: Skin is warm and dry  Psychiatric: She has a normal mood and affect  Her behavior is normal        I have personally reviewed pertinent films in PACS

## 2018-05-18 ENCOUNTER — TELEPHONE (OUTPATIENT)
Dept: OBGYN CLINIC | Facility: HOSPITAL | Age: 83
End: 2018-05-18

## 2018-05-18 NOTE — TELEPHONE ENCOUNTER
Spoke to pt  She states the boot is digging into her ankle  She states the boot rubbed the incision and and states a brownish colored liquid is on the bandage but her physical therapist looked at it and there is no puss  She is having some swelling as well  I advised her to put something between the boot and her ankle but she says she has been using paper towels to pad it  She says the padding is making it worse  She's also having pain when walking  I'm going to advise her to try and put a sock or something with more padding than paper towels and try to stay off of it as much as possible  I will give her an appt for Tuesday to be evaluated

## 2018-05-18 NOTE — TELEPHONE ENCOUNTER
Patient is calling   [phone]   Patient sees Dr Hinkle Guardian    Patient is calling to move her appointment sooner because the boot she is wearing is making her ankle extremely sore  Patient is stating that her foot is swollen & part of the boot is digging into her skin  She is also stating that the incision is oozing

## 2018-05-18 NOTE — TELEPHONE ENCOUNTER
Spoke to Christine Espino regarding this  He said that if it is oozing from the incision to have her go to the ER to be evaluated  He also said that if it is the boot that cut her we can have her come in to have the boot checked and readjusted  I LMOM for the pt to call the office ASAP so I can get more information

## 2018-05-18 NOTE — TELEPHONE ENCOUNTER
She says she can't fit the sock in right now but I did tell her to put the sock in and try to stay off of it as much as possible until Tuesday  I also advised her to call if she has any more issues

## 2018-05-22 ENCOUNTER — OFFICE VISIT (OUTPATIENT)
Dept: OBGYN CLINIC | Facility: CLINIC | Age: 83
End: 2018-05-22

## 2018-05-22 ENCOUNTER — APPOINTMENT (OUTPATIENT)
Dept: RADIOLOGY | Facility: CLINIC | Age: 83
End: 2018-05-22
Payer: MEDICARE

## 2018-05-22 VITALS
WEIGHT: 192.02 LBS | HEIGHT: 64 IN | SYSTOLIC BLOOD PRESSURE: 128 MMHG | HEART RATE: 80 BPM | BODY MASS INDEX: 32.78 KG/M2 | DIASTOLIC BLOOD PRESSURE: 83 MMHG

## 2018-05-22 DIAGNOSIS — Z87.81 STATUS POST ORIF OF FRACTURE OF ANKLE: ICD-10-CM

## 2018-05-22 DIAGNOSIS — Z87.81 STATUS POST ORIF OF FRACTURE OF ANKLE: Primary | ICD-10-CM

## 2018-05-22 DIAGNOSIS — Z98.890 STATUS POST ORIF OF FRACTURE OF ANKLE: ICD-10-CM

## 2018-05-22 DIAGNOSIS — Z98.890 STATUS POST ORIF OF FRACTURE OF ANKLE: Primary | ICD-10-CM

## 2018-05-22 PROCEDURE — 73600 X-RAY EXAM OF ANKLE: CPT

## 2018-05-22 PROCEDURE — 99024 POSTOP FOLLOW-UP VISIT: CPT | Performed by: ORTHOPAEDIC SURGERY

## 2018-05-22 NOTE — PROGRESS NOTES
CHIEF COMPLAINT:   Chief Complaint   Patient presents with    Right Ankle - Pain, Post-op         PROCEDURE: S/P ORIF right ankle March 12, 2018    SUBJECTIVE: Joslyn Nguyen is a [de-identified] y o  female is here for follow up  Patient has been in a Cam boot since last visit  She states the outside of the boot has been rubbing against her ankle causing irritation  She has had small area that was oozing from irritation  Has some numbness lateral aspect ankle  She has been walking with boot with no issues         ROS:   General: no fever, no chills  Respiratory:  No coughing, shortness of breath or wheezing  Cardiovascular:  No chest pain, no palpitations  Neurological:  No headaches, no confusion  Review of all other systems is negative    MEDS:   Current Outpatient Prescriptions   Medication Sig Dispense Refill    acetaminophen (TYLENOL) 325 mg tablet Take 325 mg by mouth every 6 (six) hours as needed for mild pain      amLODIPine (NORVASC) 5 mg tablet Take 5 mg by mouth      aspirin 81 MG tablet Take by mouth      atorvastatin (LIPITOR) 10 mg tablet Take 10 mg by mouth      benazepril (LOTENSIN) 20 mg tablet Take 20 mg by mouth daily      gabapentin (NEURONTIN) 100 mg capsule Take 100 mg by mouth 2 (two) times a day      levothyroxine 88 mcg tablet Take 88 mcg by mouth      lidocaine (LIDODERM) 5 % Place 1 patch on the skin daily Remove & Discard patch within 12 hours or as directed by MD      Magnesium Hydroxide (MILK OF MAGNESIA PO) Take by mouth      methocarbamol (ROBAXIN) 500 mg tablet Take 500 mg by mouth 4 (four) times a day      metoprolol succinate (TOPROL-XL) 100 mg 24 hr tablet Take 100 mg by mouth      Multiple Vitamins-Minerals (CERTAVITE SENIOR/ANTIOXIDANT PO) Take by mouth      traMADol (ULTRAM) 50 mg tablet Take 50 mg by mouth every 6 (six) hours as needed for moderate pain      warfarin (COUMADIN) 2 5 mg tablet Take 2 5 mg daily at dinner until changed by your physician adjust your dose 30 tablet 0     No current facility-administered medications for this visit  ALLERGIES: Patient has no known allergies  Vitals:    05/22/18 0807   BP: 128/83   Pulse: 80   Weight: 87 1 kg (192 lb 0 3 oz)   Height: 5' 4 02" (1 626 m)       PHYSICAL EXAM:    General Appearance:  Alert, cooperative, no distress, appears stated age   Lungs:   respirations unlabored   Chest Wall:  No tenderness or deformity   Heart:  Regular rate and rhythm   Extremities: Extremities normal, atraumatic, no cyanosis or edema   Pulses: 2+ and symmetric   Neurologic: Normal     ORTHO EXAM:  Examination of the right ankle  Medial incision well healed  Lateral incision well healed, there is small superficial area open at distal end from irritation from boot  No erythema noted  Range of motion limited from immobiltzatioin  Sensation intact light touch L1-S1 distributions      IMAGING:  X-rays taken 5/22/2018 of the right ankle shows hardware in place in acceptable position  ASSESSMENT/PLAN:   S/p 10 weekss above procedure  [facility] was seen today for pain and post-op  Diagnoses and all orders for this visit:    Status post ORIF of fracture of ankle  -     XR ankle 2 vw right; Future        There are no Patient Instructions on file for this visit  DISCUSSION SUMMARY:  Patient is S/P 10 weeks ORIF right ankle  Small superficial area distal wound on the lateral aspect which appears to be open from being irritated from the Cam boot  Debrided the area and cleansed  Small amount of bacitracin was applied  Patient will apply a small amount of bacitracin daily  Patient will discontinue use of the boot  Weightbearing as tolerated without the boot  Follow up in three weeks, no x-rays needed at this visit

## 2018-05-25 ENCOUNTER — TELEPHONE (OUTPATIENT)
Dept: OBGYN CLINIC | Facility: CLINIC | Age: 83
End: 2018-05-25

## 2018-05-25 NOTE — TELEPHONE ENCOUNTER
Called patient to discuss findings on X-rays report of a staple noted superficial at the plantar aspect of the heel  We did not use staples for closure and the staple if not at the surgical site  Looks like a unused staple as it still has the original "U" shape  This may be something she had on her sock the day of office visit  The Staple was not imaged on the prior months x-rays  She has no pain in the heel with walking and she did a visual inspection and there was no foreign body noted on the plantar surface of her heel       [facility]

## 2018-05-31 ENCOUNTER — OFFICE VISIT (OUTPATIENT)
Dept: OBGYN CLINIC | Facility: CLINIC | Age: 83
End: 2018-05-31

## 2018-05-31 VITALS
SYSTOLIC BLOOD PRESSURE: 118 MMHG | BODY MASS INDEX: 32.78 KG/M2 | WEIGHT: 192.02 LBS | HEIGHT: 64 IN | DIASTOLIC BLOOD PRESSURE: 79 MMHG | HEART RATE: 88 BPM

## 2018-05-31 DIAGNOSIS — S52.572D OTHER CLOSED INTRA-ARTICULAR FRACTURE OF DISTAL END OF LEFT RADIUS WITH ROUTINE HEALING, SUBSEQUENT ENCOUNTER: Primary | ICD-10-CM

## 2018-05-31 PROCEDURE — 99024 POSTOP FOLLOW-UP VISIT: CPT | Performed by: FAMILY MEDICINE

## 2018-05-31 NOTE — PROGRESS NOTES
Assessment/Plan:  Assessment/Plan   Diagnoses and all orders for this visit:    Other closed intra-articular fracture of distal end of left radius with routine healing, subsequent encounter    [de-identified]year old right hand dominant female status post left distal radius fracture from injury at home on 3/1/2018  Discussed with patient physical exam, impression, and plan  Her physical exam is unremarkable for any bony or soft tissue tenderness of the left wrist  Her range of motion in flexion and extension is noted to be within 5 degrees of the contralateral wrist  Clinical impression is that she is recovered from her injury  She is to continue with home exercises for wrist range of motion  She need only follow-up with me on an as needed basis  Stiffness of left wrist joint  Subjective:   Patient ID: Angela Merritt is a [de-identified] y o  female  Chief Complaint   Patient presents with    Left Wrist - Follow-up       21-year-old right-hand-dominant female following up for left distal radius fracture from fall injury at home on 03/01/2018  She was last seen 4 weeks ago at which point she was encouraged to continue with physical therapy for improvement in wrist range of motion and strength  Today she reports she has been doing exercises on her own at home  She has noticed improved range of motion and strength with the left wrist  She has been using rolling walker as she is status post ORIF for right ankle fracture and reports achiness of the wrist with bearing weight  She has not needed to take any pain medication for the achiness of the wrist  She reports being able to utilize utensils, prepare meals, do her hair, and hygiene without issue  She has not had any new injury since previous visit  Wrist Pain   This is a new problem  The current episode started more than 1 month ago  The problem occurs intermittently  The problem has been gradually improving  Associated symptoms include arthralgias   Pertinent negatives include no joint swelling, numbness or weakness  Exacerbated by: Bearing weight  She has tried rest and immobilization (Home exercise) for the symptoms  The treatment provided significant relief  Review of Systems   Musculoskeletal: Positive for arthralgias  Negative for joint swelling  Neurological: Negative for weakness and numbness  Objective:  Vitals:    05/31/18 1101   BP: 118/79   Pulse: 88   Weight: 87 1 kg (192 lb 0 3 oz)   Height: 5' 4 02" (1 626 m)     Left Hand Exam     Muscle Strength   The patient has normal left wrist strength  Observations     Left Wrist/Hand   Negative for deformity  Tenderness     Left Wrist/Hand   No tenderness in the first dorsal compartment, carpometacarpal joint and scaphoid  Additional Tenderness Details  Left  -No tenderness of radius or ulna    Active Range of Motion     Left Wrist   Wrist flexion: 25 degrees   Wrist extension: 40 degrees     Additional Active Range of Motion Details  Left  -Normal range of motion of fingers    Strength/Myotome Testing     Left Wrist/Hand   Normal wrist strength     (2nd hand position)     Trial 1: 5      Physical Exam   Constitutional: She is oriented to person, place, and time  She appears well-developed  No distress  HENT:   Head: Normocephalic  Eyes: Conjunctivae are normal    Neck: No tracheal deviation present  Cardiovascular: Normal rate  Pulmonary/Chest: Effort normal  No respiratory distress  Abdominal: She exhibits no distension  Musculoskeletal:        Left hand: She exhibits no deformity  Neurological: She is alert and oriented to person, place, and time  Skin: Skin is warm and dry  Psychiatric: She has a normal mood and affect  Her behavior is normal    Nursing note and vitals reviewed

## 2018-06-12 ENCOUNTER — OFFICE VISIT (OUTPATIENT)
Dept: OBGYN CLINIC | Facility: CLINIC | Age: 83
End: 2018-06-12
Payer: MEDICARE

## 2018-06-12 VITALS — HEART RATE: 82 BPM | DIASTOLIC BLOOD PRESSURE: 79 MMHG | SYSTOLIC BLOOD PRESSURE: 122 MMHG

## 2018-06-12 DIAGNOSIS — Z87.81 STATUS POST ORIF OF FRACTURE OF ANKLE: Primary | ICD-10-CM

## 2018-06-12 DIAGNOSIS — Z98.890 STATUS POST ORIF OF FRACTURE OF ANKLE: Primary | ICD-10-CM

## 2018-06-12 PROCEDURE — 99213 OFFICE O/P EST LOW 20 MIN: CPT | Performed by: ORTHOPAEDIC SURGERY

## 2018-06-12 NOTE — PROGRESS NOTES
CHIEF COMPLAINT:   Chief Complaint   Patient presents with    Right Ankle - Post-op         PROCEDURE: S/P ORIF right ankle March 12, 2018    SUBJECTIVE: Angela Merritt is a [de-identified] y o  female is here for follow up on her wound  She states it is getting better  She continues to put bacitracin on the wound to help with healing  She denies any numbness or tingling  She denies any constitutional signs or symptoms  ROS:   General: no fever, no chills  Respiratory:  No coughing, shortness of breath or wheezing  Cardiovascular:  No chest pain, no palpitations  Neurological:  No headaches, no confusion  Review of all other systems is negative    MEDS:   Current Outpatient Prescriptions   Medication Sig Dispense Refill    acetaminophen (TYLENOL) 325 mg tablet Take 325 mg by mouth every 6 (six) hours as needed for mild pain      amLODIPine (NORVASC) 5 mg tablet Take 5 mg by mouth      aspirin 81 MG tablet Take by mouth      atorvastatin (LIPITOR) 10 mg tablet Take 10 mg by mouth      benazepril (LOTENSIN) 20 mg tablet Take 20 mg by mouth daily      levothyroxine 88 mcg tablet Take 88 mcg by mouth      metoprolol succinate (TOPROL-XL) 100 mg 24 hr tablet Take 100 mg by mouth      Multiple Vitamins-Minerals (CERTAVITE SENIOR/ANTIOXIDANT PO) Take by mouth      traMADol (ULTRAM) 50 mg tablet Take 50 mg by mouth every 6 (six) hours as needed for moderate pain      warfarin (COUMADIN) 2 5 mg tablet Take 2 5 mg daily at dinner until changed by your physician adjust your dose 30 tablet 0     No current facility-administered medications for this visit  ALLERGIES: Patient has no known allergies        Vitals:    06/12/18 0812   BP: 122/79   Pulse: 82       PHYSICAL EXAM:    General Appearance:  Alert, cooperative, no distress, appears stated age   Lungs:   respirations unlabored   Chest Wall:  No tenderness or deformity   Heart:  Regular rate and rhythm   Extremities: Extremities normal, atraumatic, no cyanosis or edema Pulses: 2+ and symmetric   Neurologic: Normal     ORTHO EXAM:  Examination of the right ankle  Medial incision well healed  Lateral incision well healed  No erythema noted  Range of motion limited due to swelling  Sensation intact light touch L1-S1 distributions          ASSESSMENT/PLAN:   S/p 13 weekss above procedure  Akhil Chaparro was seen today for post-op  Diagnoses and all orders for this visit:    Status post ORIF of fracture of ankle        There are no Patient Instructions on file for this visit  DISCUSSION SUMMARY:  Patient is S/P 13 weeks ORIF right ankle  Her incision is healing well over the lateral aspect of her ankle  She will continue to put the medication over the superior aspect of the incision is there is a small scabbed over the top  She is also describing some knee symptoms that she has been having since being able to weightbear as tolerated  We will get her a hinged knee brace for additional support  She will continue to exercise as she has been at home  We will have her follow up in 4 weeks to see how she is doing  X-rays will be obtained at that visit

## 2018-07-30 ENCOUNTER — TELEPHONE (OUTPATIENT)
Dept: OBGYN CLINIC | Facility: HOSPITAL | Age: 83
End: 2018-07-30

## 2018-07-30 ENCOUNTER — APPOINTMENT (EMERGENCY)
Dept: RADIOLOGY | Facility: HOSPITAL | Age: 83
DRG: 464 | End: 2018-07-30
Payer: MEDICARE

## 2018-07-30 ENCOUNTER — HOSPITAL ENCOUNTER (EMERGENCY)
Facility: HOSPITAL | Age: 83
Discharge: HOME | DRG: 464 | End: 2018-07-30
Attending: EMERGENCY MEDICINE
Payer: MEDICARE

## 2018-07-30 VITALS
HEART RATE: 101 BPM | SYSTOLIC BLOOD PRESSURE: 143 MMHG | OXYGEN SATURATION: 95 % | DIASTOLIC BLOOD PRESSURE: 79 MMHG | TEMPERATURE: 99.1 F | HEIGHT: 64 IN | RESPIRATION RATE: 18 BRPM

## 2018-07-30 DIAGNOSIS — T81.40XA POSTOPERATIVE INFECTION, INITIAL ENCOUNTER: Primary | ICD-10-CM

## 2018-07-30 LAB
ALBUMIN SERPL BCP-MCNC: 3.7 G/DL (ref 3.5–5)
ALP SERPL-CCNC: 85 U/L (ref 46–116)
ALT SERPL W P-5'-P-CCNC: 10 U/L (ref 12–78)
ANION GAP SERPL CALCULATED.3IONS-SCNC: 5 MMOL/L (ref 4–13)
AST SERPL W P-5'-P-CCNC: 20 U/L (ref 5–45)
BASOPHILS # BLD AUTO: 0.05 THOUSANDS/ΜL (ref 0–0.1)
BASOPHILS NFR BLD AUTO: 0 % (ref 0–1)
BILIRUB SERPL-MCNC: 0.8 MG/DL (ref 0.2–1)
BUN SERPL-MCNC: 16 MG/DL (ref 5–25)
CALCIUM SERPL-MCNC: 9.1 MG/DL (ref 8.3–10.1)
CHLORIDE SERPL-SCNC: 98 MMOL/L (ref 100–108)
CO2 SERPL-SCNC: 31 MMOL/L (ref 21–32)
CREAT SERPL-MCNC: 1.13 MG/DL (ref 0.6–1.3)
CRP SERPL QL: >90 MG/L
EOSINOPHIL # BLD AUTO: 0.03 THOUSAND/ΜL (ref 0–0.61)
EOSINOPHIL NFR BLD AUTO: 0 % (ref 0–6)
ERYTHROCYTE [DISTWIDTH] IN BLOOD BY AUTOMATED COUNT: 16.6 % (ref 11.6–15.1)
ERYTHROCYTE [SEDIMENTATION RATE] IN BLOOD: 21 MM/HOUR (ref 0–20)
GFR SERPL CREATININE-BSD FRML MDRD: 46 ML/MIN/1.73SQ M
GLUCOSE SERPL-MCNC: 115 MG/DL (ref 65–140)
HCT VFR BLD AUTO: 41.3 % (ref 34.8–46.1)
HGB BLD-MCNC: 13.6 G/DL (ref 11.5–15.4)
IMM GRANULOCYTES # BLD AUTO: 0.03 THOUSAND/UL (ref 0–0.2)
IMM GRANULOCYTES NFR BLD AUTO: 0 % (ref 0–2)
LYMPHOCYTES # BLD AUTO: 1.12 THOUSANDS/ΜL (ref 0.6–4.47)
LYMPHOCYTES NFR BLD AUTO: 10 % (ref 14–44)
MCH RBC QN AUTO: 29.6 PG (ref 26.8–34.3)
MCHC RBC AUTO-ENTMCNC: 32.9 G/DL (ref 31.4–37.4)
MCV RBC AUTO: 90 FL (ref 82–98)
MONOCYTES # BLD AUTO: 1.03 THOUSAND/ΜL (ref 0.17–1.22)
MONOCYTES NFR BLD AUTO: 9 % (ref 4–12)
NEUTROPHILS # BLD AUTO: 9.01 THOUSANDS/ΜL (ref 1.85–7.62)
NEUTS SEG NFR BLD AUTO: 81 % (ref 43–75)
NRBC BLD AUTO-RTO: 0 /100 WBCS
PLATELET # BLD AUTO: 234 THOUSANDS/UL (ref 149–390)
PMV BLD AUTO: 10 FL (ref 8.9–12.7)
POTASSIUM SERPL-SCNC: 3.7 MMOL/L (ref 3.5–5.3)
PROT SERPL-MCNC: 8 G/DL (ref 6.4–8.2)
RBC # BLD AUTO: 4.6 MILLION/UL (ref 3.81–5.12)
SODIUM SERPL-SCNC: 134 MMOL/L (ref 136–145)
WBC # BLD AUTO: 11.27 THOUSAND/UL (ref 4.31–10.16)

## 2018-07-30 PROCEDURE — 86140 C-REACTIVE PROTEIN: CPT | Performed by: EMERGENCY MEDICINE

## 2018-07-30 PROCEDURE — 85652 RBC SED RATE AUTOMATED: CPT | Performed by: EMERGENCY MEDICINE

## 2018-07-30 PROCEDURE — 73610 X-RAY EXAM OF ANKLE: CPT

## 2018-07-30 PROCEDURE — 96365 THER/PROPH/DIAG IV INF INIT: CPT

## 2018-07-30 PROCEDURE — 36415 COLL VENOUS BLD VENIPUNCTURE: CPT | Performed by: EMERGENCY MEDICINE

## 2018-07-30 PROCEDURE — 85025 COMPLETE CBC W/AUTO DIFF WBC: CPT | Performed by: EMERGENCY MEDICINE

## 2018-07-30 PROCEDURE — 99284 EMERGENCY DEPT VISIT MOD MDM: CPT

## 2018-07-30 PROCEDURE — 93005 ELECTROCARDIOGRAM TRACING: CPT

## 2018-07-30 PROCEDURE — 80053 COMPREHEN METABOLIC PANEL: CPT | Performed by: EMERGENCY MEDICINE

## 2018-07-30 RX ORDER — CEPHALEXIN 250 MG/1
500 CAPSULE ORAL 4 TIMES DAILY
Qty: 56 CAPSULE | Refills: 0 | Status: SHIPPED | OUTPATIENT
Start: 2018-07-30 | End: 2018-08-10 | Stop reason: HOSPADM

## 2018-07-30 RX ADMIN — CEFAZOLIN SODIUM 2000 MG: 2 SOLUTION INTRAVENOUS at 17:26

## 2018-07-30 NOTE — DISCHARGE INSTRUCTIONS
Surgical Site Infections   WHAT YOU NEED TO KNOW:   A surgical site infection (SSI) is often caused by bacteria  It may develop 10 days to several weeks after surgery  Without treatment, the infection may spread to deeper tissues or to organs close to the surgical site  DISCHARGE INSTRUCTIONS:   Return to the emergency department if:   · You feel short of breath  · Your heart is beating faster than usual      · You are confused  · Blood soaks through your bandages  · Your wound comes apart or feels like it is ripping  · You have severe pain  · You see red streaks coming from the infected area  Contact your healthcare provider if:   · You have a fever or chills  · You have more pain, redness, or swelling near your wound  · Your symptoms do not improve  · You have new drainage or a bad odor coming from the wound  · You have questions or concerns about your condition or care  Medicines: You may need any of the following:  · NSAIDs , such as ibuprofen, help decrease swelling, pain, and fever  This medicine is available with or without a doctor's order  NSAIDs can cause stomach bleeding or kidney problems in certain people  If you take blood thinner medicine, always ask your healthcare provider if NSAIDs are safe for you  Always read the medicine label and follow directions  · Antibiotics  help treat a bacterial infection  · Take your medicine as directed  Contact your healthcare provider if you think your medicine is not helping or if you have side effects  Tell him or her if you are allergic to any medicine  Keep a list of the medicines, vitamins, and herbs you take  Include the amounts, and when and why you take them  Bring the list or the pill bottles to follow-up visits  Carry your medicine list with you in case of an emergency  Care for your wound as directed:  Keep your wound clean and dry  You may need to cover your wound when you bathe so it does not get wet   Clean your wound as directed with soap and water or wound   Put on new, clean bandages as directed  Change your bandages when they get wet or dirty  Help your wound heal:   · Eat a variety of healthy foods  Examples include fruits, vegetables, whole-grain breads, low-fat dairy products, beans, lean meats, and fish  Healthy foods may help you heal faster  You may also need to take vitamins and minerals  Ask if you need to be on a special diet  · Manage other health conditions  Follow your healthcare provider's directions to manage health conditions that can cause slow wound healing  Examples are high blood pressure and diabetes  · Do not smoke  Nicotine and other chemicals in cigarettes and cigars can cause slow wound healing  Ask your healthcare provider for information if you currently smoke and need help to quit  E-cigarettes or smokeless tobacco still contain nicotine  Talk to your healthcare provider before you use these products  Follow up with your healthcare provider in 1 to 2 days:  Write down your questions so you remember to ask them during your visits  © 2017 [facility] Information is for End User's use only and may not be sold, redistributed or otherwise used for commercial purposes  All illustrations and images included in CareNotes® are the copyrighted property of [employer]  or [employer]  The above information is an  only  It is not intended as medical advice for individual conditions or treatments  Talk to your doctor, nurse or pharmacist before following any medical regimen to see if it is safe and effective for you

## 2018-07-30 NOTE — TELEPHONE ENCOUNTER
Lmom to advise patient of her appt tomorrow  I also left message if temp elevates she should seek attention

## 2018-07-30 NOTE — ED PROVIDER NOTES
Pt Name: Aris Meyers  MRN: [id number]  [place] 1935  Age/Sex: [de-identified] y o  female  Date of evaluation: 7/30/2018  PCP: Live Tompkins DO    CHIEF COMPLAINT    Chief Complaint   Patient presents with    Wound Drain Evaluation     Pt experiencing drainage and discoloration from surgical wound (march 2018)         HPI    [de-identified] y o  female presenting with persistent drainage from surgical wound status post and ankle repair with placement of hardware performed in late March of this year  Patient states the ankle has always been draining fluid, but that in the past few days fluids turned from clear to thickened opaque, accompanied by redness in the skin, and a small area of swelling and pus  She denies fever, nausea, vomiting, further trauma, other symptoms  HPI      Past Medical and Surgical History    Past Medical History:   Diagnosis Date    Atrial fibrillation ([address] )     Disease of thyroid gland     Hyperlipidemia     Hypertension        Past Surgical History:   Procedure Laterality Date    ORIF TIBIA & FIBULA FRACTURES Right 3/12/2018    Procedure: OPEN REDUCTION W/ INTERNAL FIXATION (ORIF) ANKLE  Open reduction internal fixation lateral malleolus with possible fixation of medial malleolus;  Surgeon: Sheila Valentine MD;  Location: [facility];  Service: Orthopedics    TOTAL THYROIDECTOMY         History reviewed  No pertinent family history  Social History   Substance Use Topics    Smoking status: Never Smoker    Smokeless tobacco: Never Used    Alcohol use No           Allergies    No Known Allergies    Home Medications    Prior to Admission medications    Medication Sig Start Date End Date Taking?  Authorizing Provider   acetaminophen (TYLENOL) 325 mg tablet Take 325 mg by mouth every 6 (six) hours as needed for mild pain    Historical Provider, MD   amLODIPine (NORVASC) 5 mg tablet Take 5 mg by mouth 5/19/17   Historical Provider, MD   aspirin 81 MG tablet Take by mouth 7/13/04   Historical Provider, MD   atorvastatin (LIPITOR) 10 mg tablet Take 10 mg by mouth 5/19/17   Historical Provider, MD   benazepril (LOTENSIN) 20 mg tablet Take 20 mg by mouth daily    Historical Provider, MD   levothyroxine 88 mcg tablet Take 88 mcg by mouth 5/19/17   Historical Provider, MD   metoprolol succinate (TOPROL-XL) 100 mg 24 hr tablet Take 100 mg by mouth 5/19/17   Historical Provider, MD   Multiple Vitamins-Minerals (CERTAVITE SENIOR/ANTIOXIDANT PO) Take by mouth    Historical Provider, MD   traMADol (ULTRAM) 50 mg tablet Take 50 mg by mouth every 6 (six) hours as needed for moderate pain    Historical Provider, MD   warfarin (COUMADIN) 2 5 mg tablet Take 2 5 mg daily at dinner until changed by your physician adjust your dose 3/14/18   Pool Ryan MD           Review of Systems    Review of Systems   Constitutional: Negative for activity change, chills and fever  HENT: Negative for drooling and facial swelling  Eyes: Negative for pain, discharge and visual disturbance  Respiratory: Negative for apnea, cough, chest tightness, shortness of breath and wheezing  Cardiovascular: Negative for chest pain and leg swelling  Gastrointestinal: Negative for abdominal pain, constipation, diarrhea, nausea and vomiting  Genitourinary: Negative for difficulty urinating, dysuria and urgency  Musculoskeletal: Negative for arthralgias, back pain and gait problem  Skin: Positive for wound  Negative for color change and rash  Neurological: Negative for dizziness, speech difficulty, weakness and headaches  Psychiatric/Behavioral: Negative for agitation, behavioral problems and confusion  All other systems reviewed and negative      Physical Exam      ED Triage Vitals [07/30/18 1541]   Temperature Pulse Respirations Blood Pressure SpO2   99 1 °F (37 3 °C) 100 20 (!) 147/106 96 %      Temp Source Heart Rate Source Patient Position - Orthostatic VS BP Location FiO2 (%)   Oral Monitor Sitting Left arm -- Pain Score       No Pain               Physical Exam   Constitutional: She is oriented to person, place, and time  She appears well-developed and well-nourished  HENT:   Head: Normocephalic and atraumatic  Eyes: Conjunctivae and EOM are normal  Pupils are equal, round, and reactive to light  Neck: Normal range of motion  Neck supple  Cardiovascular: Normal rate, regular rhythm, normal heart sounds and intact distal pulses  Pulmonary/Chest: Effort normal and breath sounds normal  No respiratory distress  She has no wheezes  She has no rales  Abdominal: Soft  She exhibits no distension  There is no tenderness  There is no rebound and no guarding  Musculoskeletal: Normal range of motion  She exhibits tenderness  She exhibits no edema or deformity  Erythema and tenderness palpation along the lateral aspect of the right ankle, with small fluctuant area with some cause about 0 5 cm diameter along the surgical scar  Surrounding erythema with tenderness circumferentially around the angle for few cm proximal to the ankle  Neurological: She is alert and oriented to person, place, and time  Skin: Skin is warm and dry  No rash noted  No erythema  Psychiatric: She has a normal mood and affect   Her behavior is normal  Judgment and thought content normal             Diagnostic Results      Labs:    Results for orders placed or performed during the hospital encounter of 07/30/18   CBC and differential   Result Value Ref Range    WBC 11 27 (H) 4 31 - 10 16 Thousand/uL    RBC 4 60 3 81 - 5 12 Million/uL    Hemoglobin 13 6 11 5 - 15 4 g/dL    Hematocrit 41 3 34 8 - 46 1 %    MCV 90 82 - 98 fL    MCH 29 6 26 8 - 34 3 pg    MCHC 32 9 31 4 - 37 4 g/dL    RDW 16 6 (H) 11 6 - 15 1 %    MPV 10 0 8 9 - 12 7 fL    Platelets 565 061 - 925 Thousands/uL    nRBC 0 /100 WBCs    Neutrophils Relative 81 (H) 43 - 75 %    Immat GRANS % 0 0 - 2 %    Lymphocytes Relative 10 (L) 14 - 44 %    Monocytes Relative 9 4 - 12 % Eosinophils Relative 0 0 - 6 %    Basophils Relative 0 0 - 1 %    Neutrophils Absolute 9 01 (H) 1 85 - 7 62 Thousands/µL    Immature Grans Absolute 0 03 0 00 - 0 20 Thousand/uL    Lymphocytes Absolute 1 12 0 60 - 4 47 Thousands/µL    Monocytes Absolute 1 03 0 17 - 1 22 Thousand/µL    Eosinophils Absolute 0 03 0 00 - 0 61 Thousand/µL    Basophils Absolute 0 05 0 00 - 0 10 Thousands/µL   Comprehensive metabolic panel   Result Value Ref Range    Sodium 134 (L) 136 - 145 mmol/L    Potassium 3 7 3 5 - 5 3 mmol/L    Chloride 98 (L) 100 - 108 mmol/L    CO2 31 21 - 32 mmol/L    Anion Gap 5 4 - 13 mmol/L    BUN 16 5 - 25 mg/dL    Creatinine 1 13 0 60 - 1 30 mg/dL    Glucose 115 65 - 140 mg/dL    Calcium 9 1 8 3 - 10 1 mg/dL    AST 20 5 - 45 U/L    ALT 10 (L) 12 - 78 U/L    Alkaline Phosphatase 85 46 - 116 U/L    Total Protein 8 0 6 4 - 8 2 g/dL    Albumin 3 7 3 5 - 5 0 g/dL    Total Bilirubin 0 80 0 20 - 1 00 mg/dL    eGFR 46 ml/min/1 73sq m   Sedimentation rate, automated   Result Value Ref Range    Sed Rate 21 (H) 0 - 20 mm/hour   C-reactive protein   Result Value Ref Range    CRP >90 0 (H) <3 0 mg/L       All labs reviewed and utilized in the medical decision making process    Radiology:    XR ankle 3+ views RIGHT   Final Result      1  Suggestion of increased soft tissue swelling around the ankle  2 focal lucencies are seen within the lateral subcutaneous tissues, possibly representing air  In this setting, a gas-forming infection cannot be excluded and clinical correlation    recommended  2   No radiographic findings to suggest osteomyelitis  The study was marked in [facility] for immediate notification        Workstation performed: [id number]             All radiology studies independently viewed by me and interpreted by the radiologist     Procedure    Procedures    CritCare Time      ED Course of Care and Re-Assessments      Discussed case with Dr Tej Chambers of Orthopedics, who stated that he was planning on taking the patient to the OR on Thursday for removal of hardware with concern for wound infection  He stated that the patient was to followed up with him tomorrow morning for evaluation and operative planning  He recommended admission at this time to facilitate surgical correction of infected hardware  Discussed this patient declined admission at this time, states that she would prefer to follow up with Orthopedics tomorrow in the office as she has some things to get in order before coming to the hospital   Differential and findings discussed extensively with patient, particular discuss the possibility of  severe infection might spread up the leg  or into bloodstream and could cause worse surgical outcomes or death  After discussion of risks and benefits, patient maintained that wanted to go home tonight and see the orthopedic doctor tomorrow, stating that the infection has been going on for months and she does not believe will suddenly get worse  Discussed this with Dr Anny Andrews during a 2nd call, who stated that he will see her tomorrow in his office  Patient given [facility] emergency department started on Keflex outpatient  Medications   ceFAZolin (ANCEF) IVPB (premix) 2,000 mg (0 mg Intravenous Stopped 7/30/18 1811)           FINAL IMPRESSION    Final diagnoses:   Postoperative infection, initial encounter         DISPOSITION/PLAN    82-year-old female with history and symptoms above  Vital signs remarkable for mild tachycardia in the setting of atrial fibrillation, examination remarkable for findings concerning for surgical wound infection    Plain films with results as above, with lucencies noted but very close to the opening in the skin were closed to the wound and felt to be more consistent with air coming into breaks in the skin then with a gas-forming bacterial infection at this time in the setting of an indolent infection has been going on for months with no systemic symptoms, no pain out of proportion on exam, relatively localized infection  Do not suspect necrotizing soft tissue infection, sepsis, other acute life threat at this time  Patient encouraged to accept admission to the hospital but ultimately refused as above  Discharged strict return precautions, to follow up with Dr Michele Mckeon of orthopedics tomorrow  Time reflects when diagnosis was documented in both MDM as applicable and the Disposition within this note     Time User Action Codes Description Comment    7/30/2018  5:42 PM Unruly Yossi Add [T81  4XXA] Postoperative infection, initial encounter       ED Disposition     ED Disposition Condition Comment    Discharge  Darrin Jeffers discharge to home/self care  Condition at discharge: Stable        Follow-up Information     Follow up With Specialties Details Why Contact Info Additional [address] [facility],[address] Floor, MD Orthopedic Surgery Go in 1 day As previously scheduled to check on the infection in your ankle and discuss surgical treatment [address]  [facility] [address] [facility] Emergency Department Emergency Medicine Go to Return to the ER immediately if infection appears to be spreading or if you have fever or increased pain  [address] [facility] [address]  [phone] [place] ED, [address] [place], [place], [facility]            PATIENT REFERRED TO:    Graciela Mars MD  [place]  [address]  [facility] [address] [facility]    Go in 1 day  As previously scheduled to check on the infection in your ankle and discuss surgical treatment    [address] [facility] Emergency Department  [address] [facility] [address]  [phone]  Go to  Return to the ER immediately if infection appears to be spreading or if you have fever or increased pain        DISCHARGE MEDICATIONS:    Discharge Medication List as of 7/30/2018  6:07 PM      START taking these medications    Details   cephalexin (KEFLEX) 250 mg capsule Take 2 capsules (500 mg total) by mouth 4 (four) times a day for 7 days, Starting Mon 7/30/2018, Until Mon 8/6/2018, Print         CONTINUE these medications which have NOT CHANGED    Details   acetaminophen (TYLENOL) 325 mg tablet Take 325 mg by mouth every 6 (six) hours as needed for mild pain, Historical Med      amLODIPine (NORVASC) 5 mg tablet Take 5 mg by mouth, Starting Fri 5/19/2017, Historical Med      aspirin 81 MG tablet Take by mouth, Starting Tue 7/13/2004, Historical Med      atorvastatin (LIPITOR) 10 mg tablet Take 10 mg by mouth, Starting Fri 5/19/2017, Historical Med      benazepril (LOTENSIN) 20 mg tablet Take 20 mg by mouth daily, Historical Med      levothyroxine 88 mcg tablet Take 88 mcg by mouth, Starting Fri 5/19/2017, Historical Med      metoprolol succinate (TOPROL-XL) 100 mg 24 hr tablet Take 100 mg by mouth, Starting Fri 5/19/2017, Historical Med      Multiple Vitamins-Minerals (CERTAVITE SENIOR/ANTIOXIDANT PO) Take by mouth, Historical Med      traMADol (ULTRAM) 50 mg tablet Take 50 mg by mouth every 6 (six) hours as needed for moderate pain, Historical Med      warfarin (COUMADIN) 2 5 mg tablet Take 2 5 mg daily at dinner until changed by your physician adjust your dose, Print             No discharge procedures on file           MD Radha Lo MD  07/30/18 2282

## 2018-07-30 NOTE — TELEPHONE ENCOUNTER
Dr Wendy Conley office - R Ankle ORIF    Patient calling today that her ankle incisional area is draining  Surgery was 3/12/18  Appt made for 7/31 for evaluation by Dr Wendy Conley  Patient advised if she is seeing increased redness, swelling, increased pain, and  the drainage is purulent or if she has a fever greater than 101 she should report to ER

## 2018-07-30 NOTE — TELEPHONE ENCOUNTER
That is long time to still have drainage from wound   Please make sure she is coming in tomorrow so we can look at it, thanks    Luz

## 2018-07-31 ENCOUNTER — OFFICE VISIT (OUTPATIENT)
Dept: OBGYN CLINIC | Facility: CLINIC | Age: 83
End: 2018-07-31
Payer: MEDICARE

## 2018-07-31 VITALS
SYSTOLIC BLOOD PRESSURE: 115 MMHG | DIASTOLIC BLOOD PRESSURE: 74 MMHG | WEIGHT: 183.4 LBS | HEIGHT: 64 IN | RESPIRATION RATE: 16 BRPM | HEART RATE: 105 BPM | BODY MASS INDEX: 31.31 KG/M2

## 2018-07-31 DIAGNOSIS — Z98.890 STATUS POST ORIF OF FRACTURE OF ANKLE: Primary | ICD-10-CM

## 2018-07-31 DIAGNOSIS — Z87.81 STATUS POST ORIF OF FRACTURE OF ANKLE: Primary | ICD-10-CM

## 2018-07-31 LAB
ATRIAL RATE: 105 BPM
QRS AXIS: 27 DEGREES
QRSD INTERVAL: 112 MS
QT INTERVAL: 358 MS
QTC INTERVAL: 473 MS
T WAVE AXIS: 64 DEGREES
VENTRICULAR RATE: 105 BPM

## 2018-07-31 PROCEDURE — 99214 OFFICE O/P EST MOD 30 MIN: CPT | Performed by: ORTHOPAEDIC SURGERY

## 2018-07-31 PROCEDURE — 93010 ELECTROCARDIOGRAM REPORT: CPT | Performed by: INTERNAL MEDICINE

## 2018-07-31 RX ORDER — FUROSEMIDE 20 MG/1
20 TABLET ORAL DAILY
COMMUNITY

## 2018-07-31 NOTE — PROGRESS NOTES
_____________________________________________________  CHIEF COMPLAINT:  Chief Complaint   Patient presents with    Right Ankle - Pain, Swelling, Numbness         SUBJECTIVE:  Eusebia Maldonado is a [de-identified]y o  year old female who is 4 months s/p ORIF right ankle  She presents with swelling and drainage in the area of her lateral incision  She states yesterday that she went to the emergency department the for the puruent drainage an some erythema  Wound had previously healed  However she did have some post op drainage earlier and had treated it with dry sterile dressing and Neosporin  She denies any numbness or tingling  She denies any constitutional signs or symptoms  Admission was recommended on 7/30 but patient refused  She refuses again today but is willing to present for hardware removal and I and D of right ankle wound on 8/2/2018  She is taking PO antibiotics  PAST MEDICAL HISTORY:  Past Medical History:   Diagnosis Date    Atrial fibrillation ([address] )     Disease of thyroid gland     Hyperlipidemia     Hypertension        PAST SURGICAL HISTORY:  Past Surgical History:   Procedure Laterality Date    ORIF TIBIA & FIBULA FRACTURES Right 3/12/2018    Procedure: OPEN REDUCTION W/ INTERNAL FIXATION (ORIF) ANKLE  Open reduction internal fixation lateral malleolus with possible fixation of medial malleolus;  Surgeon: Ramses Barnes MD;  Location: [facility];  Service: Orthopedics    TOTAL THYROIDECTOMY         FAMILY HISTORY:  History reviewed  No pertinent family history      SOCIAL HISTORY:  Social History   Substance Use Topics    Smoking status: Never Smoker    Smokeless tobacco: Never Used    Alcohol use No       MEDICATIONS:    Current Outpatient Prescriptions:     acetaminophen (TYLENOL) 325 mg tablet, Take 325 mg by mouth every 6 (six) hours as needed for mild pain, Disp: , Rfl:     amLODIPine (NORVASC) 5 mg tablet, Take 5 mg by mouth, Disp: , Rfl:     aspirin 81 MG tablet, Take by mouth, Disp: , Rfl:     atorvastatin (LIPITOR) 10 mg tablet, Take 10 mg by mouth, Disp: , Rfl:     benazepril (LOTENSIN) 20 mg tablet, Take 20 mg by mouth daily, Disp: , Rfl:     cephalexin (KEFLEX) 250 mg capsule, Take 2 capsules (500 mg total) by mouth 4 (four) times a day for 7 days, Disp: 56 capsule, Rfl: 0    furosemide (LASIX) 20 mg tablet, Take 20 mg by mouth daily, Disp: , Rfl:     levothyroxine 88 mcg tablet, Take 88 mcg by mouth, Disp: , Rfl:     metoprolol succinate (TOPROL-XL) 100 mg 24 hr tablet, Take 100 mg by mouth, Disp: , Rfl:     Multiple Vitamins-Minerals (CERTAVITE SENIOR/ANTIOXIDANT PO), Take by mouth, Disp: , Rfl:     warfarin (COUMADIN) 2 5 mg tablet, Take 2 5 mg daily at dinner until changed by your physician adjust your dose, Disp: 30 tablet, Rfl: 0  No current facility-administered medications for this visit       ALLERGIES:  No Known Allergies    REVIEW OF SYSTEMS:  General: no fever, no chills  HEENT:  No loss of hearing or eyesight problems  Eyes:  No red eyes  Respiratory:  No coughing, shortness of breath or wheezing  Cardiovascular:  No chest pain, no palpitations  GI:  Abdomen soft nontender, denies nausea  Endocrine:  No muscle weakness, no frequent urination, no excessive thirst  Urinary:  No dysuria, no incontinence  Musculoskeletal: see HPI and PE  SKIN:  No skin rash, no dry skin  Neurological:  No headaches, no confusion  Psychiatric:  No suicide thoughts, no anxiety, no depression  Review of all other systems is negative    LABS:  HgA1c: No results found for: HGBA1C  BMP:   Lab Results   Component Value Date    GLUCOSE 115 07/30/2018    CALCIUM 9 1 07/30/2018     (L) 07/30/2018    K 3 7 07/30/2018    CO2 31 07/30/2018    CL 98 (L) 07/30/2018    BUN 16 07/30/2018    CREATININE 1 13 07/30/2018       _____________________________________________________  PHYSICAL EXAMINATION:  General: well developed and well nourished, alert, oriented times 3 and appears comfortable  Psychiatric: Normal  HEENT: Trachea Midline, No torticollis  Cardiovascular:  Regular rate and rhythm  Pulmonary:  Clear to auscultation no wheezing or rhonchi  Skin: No masses, erthema, lacerations, fluctation, ulcerations  Neurovascular: Sensation Intact to light touch right foot  Motor - normal strength and ROM of right ankle and toes  MUSCULOSKELETAL EXAMINATION:  Right ankle:  Erythema noted around the incision  Moderate amount of swelling  Active drainage from the incision that was purulent in nature  _____________________________________________________  STUDIES REVIEWED:  X-rays 7/30/2018 right ankle - Healed bimalleolar fracture s/p internal fixation  Fixation intact  No periosteal reaction or erosions in bone  Positive osteopenia  Positive soft tissue swelling (however patient is morbidly obese)  ASSESSMENT/PLAN:      Assessment:   Wound infection right ankle status post ORIF ankle fracture    Plan: We will have the patient get scheduled for a right ankle I and D and removal of hardware on 8/2/2018  Risks and benefits of surgery were discussed with the patient  Patient understands risks and wishes to proceed  All questions were answered to her satisfaction  Follow Up:   After surgery

## 2018-07-31 NOTE — H&P
_____________________________________________________  CHIEF COMPLAINT:  Chief Complaint   Patient presents with    Right Ankle - Pain, Swelling, Numbness         SUBJECTIVE:  Chaz Snow is a [de-identified]y o  year old female who presents right ankle swelling and drainage  She states yesterday that she went to the emergency department the for the drainage does after open reduction internal fixation ankle fracture  She noted much more purulent drainage and redness in the skin  She was previously treating this with Neosporin  She denies any numbness or tingling  She denies any constitutional signs or symptoms  PAST MEDICAL HISTORY:  Past Medical History:   Diagnosis Date    Atrial fibrillation ([address] )     Disease of thyroid gland     Hyperlipidemia     Hypertension        PAST SURGICAL HISTORY:  Past Surgical History:   Procedure Laterality Date    ORIF TIBIA & FIBULA FRACTURES Right 3/12/2018    Procedure: OPEN REDUCTION W/ INTERNAL FIXATION (ORIF) ANKLE  Open reduction internal fixation lateral malleolus with possible fixation of medial malleolus;  Surgeon: Yoon Allen MD;  Location: [facility];  Service: Orthopedics    TOTAL THYROIDECTOMY         FAMILY HISTORY:  History reviewed  No pertinent family history      SOCIAL HISTORY:  Social History   Substance Use Topics    Smoking status: Never Smoker    Smokeless tobacco: Never Used    Alcohol use No       MEDICATIONS:    Current Outpatient Prescriptions:     acetaminophen (TYLENOL) 325 mg tablet, Take 325 mg by mouth every 6 (six) hours as needed for mild pain, Disp: , Rfl:     amLODIPine (NORVASC) 5 mg tablet, Take 5 mg by mouth, Disp: , Rfl:     aspirin 81 MG tablet, Take by mouth, Disp: , Rfl:     atorvastatin (LIPITOR) 10 mg tablet, Take 10 mg by mouth, Disp: , Rfl:     benazepril (LOTENSIN) 20 mg tablet, Take 20 mg by mouth daily, Disp: , Rfl:     cephalexin (KEFLEX) 250 mg capsule, Take 2 capsules (500 mg total) by mouth 4 (four) times a day for 7 days, Disp: 56 capsule, Rfl: 0    furosemide (LASIX) 20 mg tablet, Take 20 mg by mouth daily, Disp: , Rfl:     levothyroxine 88 mcg tablet, Take 88 mcg by mouth, Disp: , Rfl:     metoprolol succinate (TOPROL-XL) 100 mg 24 hr tablet, Take 100 mg by mouth, Disp: , Rfl:     Multiple Vitamins-Minerals (CERTAVITE SENIOR/ANTIOXIDANT PO), Take by mouth, Disp: , Rfl:     warfarin (COUMADIN) 2 5 mg tablet, Take 2 5 mg daily at dinner until changed by your physician adjust your dose, Disp: 30 tablet, Rfl: 0  No current facility-administered medications for this visit       ALLERGIES:  No Known Allergies    REVIEW OF SYSTEMS:  General: no fever, no chills  HEENT:  No loss of hearing or eyesight problems  Eyes:  No red eyes  Respiratory:  No coughing, shortness of breath or wheezing  Cardiovascular:  No chest pain, no palpitations  GI:  Abdomen soft nontender, denies nausea  Endocrine:  No muscle weakness, no frequent urination, no excessive thirst  Urinary:  No dysuria, no incontinence  Musculoskeletal: see HPI and PE  SKIN:  No skin rash, no dry skin  Neurological:  No headaches, no confusion  Psychiatric:  No suicide thoughts, no anxiety, no depression  Review of all other systems is negative    LABS:  HgA1c: No results found for: HGBA1C  BMP:   Lab Results   Component Value Date    GLUCOSE 115 07/30/2018    CALCIUM 9 1 07/30/2018     (L) 07/30/2018    K 3 7 07/30/2018    CO2 31 07/30/2018    CL 98 (L) 07/30/2018    BUN 16 07/30/2018    CREATININE 1 13 07/30/2018       _____________________________________________________  PHYSICAL EXAMINATION:  General: well developed and well nourished, alert, oriented times 3 and appears comfortable  Psychiatric: Normal  HEENT: Trachea Midline, No torticollis  Cardiovascular:  Regular rate and rhythm  Pulmonary:  Clear to auscultation no wheezing or rhonchi  Skin: No masses, erthema, lacerations, fluctation, ulcerations  Neurovascular: Sensation Intact to the Median, Ulnar, Radial Nerve, Motor Intact to the Median, Ulnar, Radial Nerve and Pulses Intact    MUSCULOSKELETAL EXAMINATION:  Right ankle:  Erythema noted around the incision  Moderate amount of swelling  Active drainage from the incision that was purulent in nature  _____________________________________________________  STUDIES REVIEWED:  None      ASSESSMENT/PLAN:    Diagnoses and all orders for this visit:    Status post ORIF of fracture of ankle    Other orders  -     furosemide (LASIX) 20 mg tablet; Take 20 mg by mouth daily        Assessment:   Infected right ankle status post ORIF ankle fracture    Plan: We will have the patient get scheduled for a right ankle I and D, removal of hardware  Risks and benefits of surgery were discussed with the patient  Patient understands risks and wishes to proceed  All questions were answered to her satisfaction  We will get her scheduled for this for Thursday  Follow Up:   After surgery    PROCEDURES PERFORMED:  None

## 2018-07-31 NOTE — PRE-PROCEDURE INSTRUCTIONS
Pre-Surgery Instructions:   Medication Instructions    acetaminophen (TYLENOL) 325 mg tablet Patient was instructed by Physician and understands   amLODIPine (NORVASC) 5 mg tablet Patient was instructed by Physician and understands   atorvastatin (LIPITOR) 10 mg tablet Patient was instructed by Physician and understands   benazepril (LOTENSIN) 20 mg tablet Patient was instructed by Physician and understands   cephalexin (KEFLEX) 250 mg capsule Patient was instructed by Physician and understands   furosemide (LASIX) 20 mg tablet Patient was instructed by Physician and understands   levothyroxine 88 mcg tablet Patient was instructed by Physician and understands   metoprolol succinate (TOPROL-XL) 100 mg 24 hr tablet Patient was instructed by Physician and understands   Multiple Vitamins-Minerals (CERTAVITE SENIOR/ANTIOXIDANT PO) Patient was instructed by Physician and understands   warfarin (COUMADIN) 2 5 mg tablet Patient was instructed by Physician and understands

## 2018-08-02 ENCOUNTER — ANESTHESIA (OUTPATIENT)
Dept: PERIOP | Facility: HOSPITAL | Age: 83
DRG: 464 | End: 2018-08-02
Payer: MEDICARE

## 2018-08-02 ENCOUNTER — APPOINTMENT (OUTPATIENT)
Dept: RADIOLOGY | Facility: HOSPITAL | Age: 83
DRG: 464 | End: 2018-08-02
Payer: MEDICARE

## 2018-08-02 ENCOUNTER — HOSPITAL ENCOUNTER (INPATIENT)
Facility: HOSPITAL | Age: 83
LOS: 8 days | Discharge: HOME HEALTH | DRG: 464 | End: 2018-08-10
Attending: ORTHOPAEDIC SURGERY | Admitting: ORTHOPAEDIC SURGERY
Payer: MEDICARE

## 2018-08-02 ENCOUNTER — ANESTHESIA EVENT (OUTPATIENT)
Dept: PERIOP | Facility: HOSPITAL | Age: 83
DRG: 464 | End: 2018-08-02
Payer: MEDICARE

## 2018-08-02 DIAGNOSIS — Z87.81 STATUS POST ORIF OF FRACTURE OF ANKLE: ICD-10-CM

## 2018-08-02 DIAGNOSIS — T81.49XA WOUND INFECTION FOLLOWING PROCEDURE: Primary | ICD-10-CM

## 2018-08-02 DIAGNOSIS — M86.9 OSTEOMYELITIS (HCC): ICD-10-CM

## 2018-08-02 DIAGNOSIS — S52.502D CLOSED FRACTURE OF DISTAL END OF LEFT RADIUS WITH ROUTINE HEALING, UNSPECIFIED FRACTURE MORPHOLOGY, SUBSEQUENT ENCOUNTER: ICD-10-CM

## 2018-08-02 DIAGNOSIS — I48.20 CHRONIC ATRIAL FIBRILLATION (HCC): ICD-10-CM

## 2018-08-02 DIAGNOSIS — Z98.890 STATUS POST ORIF OF FRACTURE OF ANKLE: ICD-10-CM

## 2018-08-02 LAB
INR PPP: 1.49 (ref 0.86–1.17)
PROTHROMBIN TIME: 17.9 SECONDS (ref 11.8–14.2)

## 2018-08-02 PROCEDURE — 73600 X-RAY EXAM OF ANKLE: CPT

## 2018-08-02 PROCEDURE — 20680 REMOVAL OF IMPLANT DEEP: CPT | Performed by: ORTHOPAEDIC SURGERY

## 2018-08-02 PROCEDURE — 87205 SMEAR GRAM STAIN: CPT | Performed by: ORTHOPAEDIC SURGERY

## 2018-08-02 PROCEDURE — 0SPF04Z REMOVAL OF INTERNAL FIXATION DEVICE FROM RIGHT ANKLE JOINT, OPEN APPROACH: ICD-10-PCS | Performed by: ORTHOPAEDIC SURGERY

## 2018-08-02 PROCEDURE — 87070 CULTURE OTHR SPECIMN AEROBIC: CPT | Performed by: ORTHOPAEDIC SURGERY

## 2018-08-02 PROCEDURE — 87186 SC STD MICRODIL/AGAR DIL: CPT | Performed by: ORTHOPAEDIC SURGERY

## 2018-08-02 PROCEDURE — 85610 PROTHROMBIN TIME: CPT | Performed by: PHYSICIAN ASSISTANT

## 2018-08-02 PROCEDURE — 87147 CULTURE TYPE IMMUNOLOGIC: CPT | Performed by: ORTHOPAEDIC SURGERY

## 2018-08-02 PROCEDURE — 0JBQ0ZZ EXCISION OF RIGHT FOOT SUBCUTANEOUS TISSUE AND FASCIA, OPEN APPROACH: ICD-10-PCS | Performed by: ORTHOPAEDIC SURGERY

## 2018-08-02 PROCEDURE — 3E1U38Z IRRIGATION OF JOINTS USING IRRIGATING SUBSTANCE, PERCUTANEOUS APPROACH: ICD-10-PCS | Performed by: ORTHOPAEDIC SURGERY

## 2018-08-02 PROCEDURE — 87075 CULTR BACTERIA EXCEPT BLOOD: CPT | Performed by: ORTHOPAEDIC SURGERY

## 2018-08-02 PROCEDURE — 02HV33Z INSERTION OF INFUSION DEVICE INTO SUPERIOR VENA CAVA, PERCUTANEOUS APPROACH: ICD-10-PCS | Performed by: ORTHOPAEDIC SURGERY

## 2018-08-02 PROCEDURE — 87176 TISSUE HOMOGENIZATION CULTR: CPT | Performed by: ORTHOPAEDIC SURGERY

## 2018-08-02 RX ORDER — PROPOFOL 10 MG/ML
INJECTION, EMULSION INTRAVENOUS AS NEEDED
Status: DISCONTINUED | OUTPATIENT
Start: 2018-08-02 | End: 2018-08-02 | Stop reason: SURG

## 2018-08-02 RX ORDER — ACETAMINOPHEN 325 MG/1
325 TABLET ORAL EVERY 6 HOURS PRN
Status: DISCONTINUED | OUTPATIENT
Start: 2018-08-02 | End: 2018-08-10 | Stop reason: HOSPADM

## 2018-08-02 RX ORDER — ALBUTEROL SULFATE 2.5 MG/3ML
2.5 SOLUTION RESPIRATORY (INHALATION) ONCE AS NEEDED
Status: DISCONTINUED | OUTPATIENT
Start: 2018-08-02 | End: 2018-08-02 | Stop reason: HOSPADM

## 2018-08-02 RX ORDER — LIDOCAINE HYDROCHLORIDE AND EPINEPHRINE 10; 10 MG/ML; UG/ML
INJECTION, SOLUTION INFILTRATION; PERINEURAL AS NEEDED
Status: DISCONTINUED | OUTPATIENT
Start: 2018-08-02 | End: 2018-08-02 | Stop reason: HOSPADM

## 2018-08-02 RX ORDER — FUROSEMIDE 20 MG/1
20 TABLET ORAL DAILY
Status: DISCONTINUED | OUTPATIENT
Start: 2018-08-03 | End: 2018-08-10 | Stop reason: HOSPADM

## 2018-08-02 RX ORDER — LEVOTHYROXINE SODIUM 88 UG/1
88 TABLET ORAL
Status: DISCONTINUED | OUTPATIENT
Start: 2018-08-03 | End: 2018-08-10 | Stop reason: HOSPADM

## 2018-08-02 RX ORDER — ONDANSETRON 2 MG/ML
4 INJECTION INTRAMUSCULAR; INTRAVENOUS ONCE AS NEEDED
Status: DISCONTINUED | OUTPATIENT
Start: 2018-08-02 | End: 2018-08-02 | Stop reason: HOSPADM

## 2018-08-02 RX ORDER — FENTANYL CITRATE 50 UG/ML
INJECTION, SOLUTION INTRAMUSCULAR; INTRAVENOUS AS NEEDED
Status: DISCONTINUED | OUTPATIENT
Start: 2018-08-02 | End: 2018-08-02 | Stop reason: SURG

## 2018-08-02 RX ORDER — METOPROLOL SUCCINATE 100 MG/1
100 TABLET, EXTENDED RELEASE ORAL DAILY
Status: DISCONTINUED | OUTPATIENT
Start: 2018-08-03 | End: 2018-08-10 | Stop reason: HOSPADM

## 2018-08-02 RX ORDER — HYDROMORPHONE HYDROCHLORIDE 2 MG/1
2 TABLET ORAL EVERY 4 HOURS PRN
Status: DISCONTINUED | OUTPATIENT
Start: 2018-08-02 | End: 2018-08-10 | Stop reason: HOSPADM

## 2018-08-02 RX ORDER — LISINOPRIL 20 MG/1
20 TABLET ORAL DAILY
Status: DISCONTINUED | OUTPATIENT
Start: 2018-08-03 | End: 2018-08-10 | Stop reason: HOSPADM

## 2018-08-02 RX ORDER — FENTANYL CITRATE/PF 50 MCG/ML
25 SYRINGE (ML) INJECTION
Status: DISCONTINUED | OUTPATIENT
Start: 2018-08-02 | End: 2018-08-02 | Stop reason: HOSPADM

## 2018-08-02 RX ORDER — ONDANSETRON 2 MG/ML
INJECTION INTRAMUSCULAR; INTRAVENOUS AS NEEDED
Status: DISCONTINUED | OUTPATIENT
Start: 2018-08-02 | End: 2018-08-02 | Stop reason: SURG

## 2018-08-02 RX ORDER — SODIUM CHLORIDE, SODIUM LACTATE, POTASSIUM CHLORIDE, CALCIUM CHLORIDE 600; 310; 30; 20 MG/100ML; MG/100ML; MG/100ML; MG/100ML
INJECTION, SOLUTION INTRAVENOUS CONTINUOUS PRN
Status: DISCONTINUED | OUTPATIENT
Start: 2018-08-02 | End: 2018-08-02 | Stop reason: SURG

## 2018-08-02 RX ORDER — VANCOMYCIN HYDROCHLORIDE 500 MG/100ML
500 INJECTION, SOLUTION INTRAVENOUS EVERY 12 HOURS
Status: COMPLETED | OUTPATIENT
Start: 2018-08-02 | End: 2018-08-03

## 2018-08-02 RX ORDER — ATORVASTATIN CALCIUM 10 MG/1
10 TABLET, FILM COATED ORAL
Status: DISCONTINUED | OUTPATIENT
Start: 2018-08-02 | End: 2018-08-10 | Stop reason: HOSPADM

## 2018-08-02 RX ORDER — SODIUM CHLORIDE, SODIUM LACTATE, POTASSIUM CHLORIDE, CALCIUM CHLORIDE 600; 310; 30; 20 MG/100ML; MG/100ML; MG/100ML; MG/100ML
125 INJECTION, SOLUTION INTRAVENOUS CONTINUOUS
Status: DISCONTINUED | OUTPATIENT
Start: 2018-08-02 | End: 2018-08-02

## 2018-08-02 RX ORDER — WARFARIN SODIUM 2.5 MG/1
2.5 TABLET ORAL
Status: DISCONTINUED | OUTPATIENT
Start: 2018-08-02 | End: 2018-08-07

## 2018-08-02 RX ORDER — AMLODIPINE BESYLATE 5 MG/1
5 TABLET ORAL DAILY
Status: DISCONTINUED | OUTPATIENT
Start: 2018-08-03 | End: 2018-08-10 | Stop reason: HOSPADM

## 2018-08-02 RX ORDER — MAGNESIUM HYDROXIDE 1200 MG/15ML
LIQUID ORAL AS NEEDED
Status: DISCONTINUED | OUTPATIENT
Start: 2018-08-02 | End: 2018-08-02 | Stop reason: HOSPADM

## 2018-08-02 RX ORDER — DEXMEDETOMIDINE HYDROCHLORIDE 100 UG/ML
INJECTION, SOLUTION INTRAVENOUS AS NEEDED
Status: DISCONTINUED | OUTPATIENT
Start: 2018-08-02 | End: 2018-08-02 | Stop reason: SURG

## 2018-08-02 RX ORDER — ASPIRIN 81 MG/1
81 TABLET, CHEWABLE ORAL DAILY
Status: DISCONTINUED | OUTPATIENT
Start: 2018-08-03 | End: 2018-08-10 | Stop reason: HOSPADM

## 2018-08-02 RX ORDER — MEPERIDINE HYDROCHLORIDE 50 MG/ML
12.5 INJECTION INTRAMUSCULAR; INTRAVENOUS; SUBCUTANEOUS AS NEEDED
Status: DISCONTINUED | OUTPATIENT
Start: 2018-08-02 | End: 2018-08-02 | Stop reason: HOSPADM

## 2018-08-02 RX ADMIN — PROPOFOL 50 MG: 10 INJECTION, EMULSION INTRAVENOUS at 13:58

## 2018-08-02 RX ADMIN — VANCOMYCIN HYDROCHLORIDE 500 MG: 500 INJECTION, SOLUTION INTRAVENOUS at 17:53

## 2018-08-02 RX ADMIN — FENTANYL CITRATE 25 MCG: 50 INJECTION, SOLUTION INTRAMUSCULAR; INTRAVENOUS at 13:50

## 2018-08-02 RX ADMIN — ATORVASTATIN CALCIUM 10 MG: 10 TABLET, FILM COATED ORAL at 17:51

## 2018-08-02 RX ADMIN — LIDOCAINE HYDROCHLORIDE 100 MG: 20 INJECTION, SOLUTION INTRAVENOUS at 13:05

## 2018-08-02 RX ADMIN — FENTANYL CITRATE 25 MCG: 50 INJECTION, SOLUTION INTRAMUSCULAR; INTRAVENOUS at 13:10

## 2018-08-02 RX ADMIN — DEXMEDETOMIDINE 8 MCG: 100 INJECTION, SOLUTION, CONCENTRATE INTRAVENOUS at 13:45

## 2018-08-02 RX ADMIN — WARFARIN SODIUM 2.5 MG: 2.5 TABLET ORAL at 17:51

## 2018-08-02 RX ADMIN — ONDANSETRON 4 MG: 2 INJECTION INTRAMUSCULAR; INTRAVENOUS at 13:05

## 2018-08-02 RX ADMIN — CEFAZOLIN SODIUM 2000 MG: 2 SOLUTION INTRAVENOUS at 21:09

## 2018-08-02 RX ADMIN — HYDROMORPHONE HYDROCHLORIDE 2 MG: 2 TABLET ORAL at 18:02

## 2018-08-02 RX ADMIN — FENTANYL CITRATE 25 MCG: 50 INJECTION, SOLUTION INTRAMUSCULAR; INTRAVENOUS at 15:19

## 2018-08-02 RX ADMIN — CEFAZOLIN SODIUM 2000 MG: 2 SOLUTION INTRAVENOUS at 13:00

## 2018-08-02 RX ADMIN — DEXMEDETOMIDINE 8 MCG: 100 INJECTION, SOLUTION, CONCENTRATE INTRAVENOUS at 14:15

## 2018-08-02 RX ADMIN — FENTANYL CITRATE 50 MCG: 50 INJECTION, SOLUTION INTRAMUSCULAR; INTRAVENOUS at 13:00

## 2018-08-02 RX ADMIN — SODIUM CHLORIDE, SODIUM LACTATE, POTASSIUM CHLORIDE, AND CALCIUM CHLORIDE: .6; .31; .03; .02 INJECTION, SOLUTION INTRAVENOUS at 13:00

## 2018-08-02 RX ADMIN — PROPOFOL 150 MG: 10 INJECTION, EMULSION INTRAVENOUS at 13:05

## 2018-08-02 NOTE — ANESTHESIA PREPROCEDURE EVALUATION
Review of Systems/Medical History    Chart reviewed      Cardiovascular  EKG reviewed, Hyperlipidemia, Hypertension , Dysrhythmias , atrial fibrillation,   Comment: Undetermined rhythm  Nonspecific T wave abnormality  Prolonged QT  Abnormal ECG  No previous ECGs available  Confirmed by Julio Hampton ([id number]) on 3/8/2018 ,  Pulmonary    Comment: Nocturnal hypoxia     GI/Hepatic      Comment: constipation     Chronic kidney disease stage 3,        Endo/Other  History of thyroid disease , hypothyroidism,      GYN  Negative gynecology ROS          Hematology      Comment: leucocytosis Musculoskeletal    Comment: Bimalleolar fracture Right ankle      Neurology  Negative neurology ROS      Psychology   Negative psychology ROS            Pt offers a hx of difficult a/w at time of thyroidectomy for goiter  Likely related to goiter  No clear markers of diff a/w  Will proceed w/ GA and ETT  Results for Reji Mcmillan (MRN [id number]) as of 3/12/2018 09:09   Ref  Range 3/9/2018 05:39   Sodium Latest Ref Range: 136 - 145 mmol/L 139   Potassium Latest Ref Range: 3 5 - 5 3 mmol/L 3 8   Chloride Latest Ref Range: 100 - 108 mmol/L 103   CO2 Latest Ref Range: 21 - 32 mmol/L 27   Anion Gap Latest Ref Range: 4 - 13 mmol/L 9   BUN Latest Ref Range: 5 - 25 mg/dL 17   Creatinine Latest Ref Range: 0 60 - 1 30 mg/dL 1 06   Glucose Latest Ref Range: 65 - 140 mg/dL 96   Calcium Latest Ref Range: 8 3 - 10 1 mg/dL 8 6   eGFR Latest Units: ml/min/1 73sq m 50   WBC Latest Ref Range: 4 31 - 10 16 Thousand/uL 6 92   RBC Latest Ref Range: 3 81 - 5 12 Million/uL 3 93   Hemoglobin Latest Ref Range: 11 5 - 15 4 g/dL 12 0   Hematocrit Latest Ref Range: 34 8 - 46 1 % 36 8       Physical Exam    Airway    Mallampati score: II  TM Distance: >3 FB  Neck ROM: full     Dental       Cardiovascular      Pulmonary      Other Findings        Anesthesia Plan  ASA Score- 3     Anesthesia Type- general with ASA Monitors     Additional Monitors:   Airway Plan: LMA  Comment: Patient seen and examined, history reviewed  Patient to be done under general anesthesia with LMA and routine monitors  Risks discussed with the patient, consent obtained        Plan Factors-Patient not instructed to abstain from smoking on day of procedure  Patient did not smoke on day of surgery  Induction- intravenous  Postoperative Plan- Plan for postoperative opioid use  Informed Consent- Anesthetic plan and risks discussed with patient  I personally reviewed this patient with the CRNA  Discussed and agreed on the Anesthesia Plan with the CRNA  Marquise Johnson Pt seen and examined  All labs and diagnostic data personally reviewed  I agree with documentation in the pre-operative assessment performed by the CRNA

## 2018-08-02 NOTE — H&P (VIEW-ONLY)
_____________________________________________________  CHIEF COMPLAINT:  Chief Complaint   Patient presents with    Right Ankle - Pain, Swelling, Numbness         SUBJECTIVE:  Angela Merritt is a [de-identified]y o  year old female who is 4 months s/p ORIF right ankle  She presents with swelling and drainage in the area of her lateral incision  She states yesterday that she went to the emergency department the for the puruent drainage an some erythema  Wound had previously healed  However she did have some post op drainage earlier and had treated it with dry sterile dressing and Neosporin  She denies any numbness or tingling  She denies any constitutional signs or symptoms  Admission was recommended on 7/30 but patient refused  She refuses again today but is willing to present for hardware removal and I and D of right ankle wound on 8/2/2018  She is taking PO antibiotics  PAST MEDICAL HISTORY:  Past Medical History:   Diagnosis Date    Atrial fibrillation ([address] )     Disease of thyroid gland     Hyperlipidemia     Hypertension        PAST SURGICAL HISTORY:  Past Surgical History:   Procedure Laterality Date    ORIF TIBIA & FIBULA FRACTURES Right 3/12/2018    Procedure: OPEN REDUCTION W/ INTERNAL FIXATION (ORIF) ANKLE  Open reduction internal fixation lateral malleolus with possible fixation of medial malleolus;  Surgeon: Shanice Sal MD;  Location: [facility];  Service: Orthopedics    TOTAL THYROIDECTOMY         FAMILY HISTORY:  History reviewed  No pertinent family history      SOCIAL HISTORY:  Social History   Substance Use Topics    Smoking status: Never Smoker    Smokeless tobacco: Never Used    Alcohol use No       MEDICATIONS:    Current Outpatient Prescriptions:     acetaminophen (TYLENOL) 325 mg tablet, Take 325 mg by mouth every 6 (six) hours as needed for mild pain, Disp: , Rfl:     amLODIPine (NORVASC) 5 mg tablet, Take 5 mg by mouth, Disp: , Rfl:     aspirin 81 MG tablet, Take by mouth, Disp: , Rfl:     atorvastatin (LIPITOR) 10 mg tablet, Take 10 mg by mouth, Disp: , Rfl:     benazepril (LOTENSIN) 20 mg tablet, Take 20 mg by mouth daily, Disp: , Rfl:     cephalexin (KEFLEX) 250 mg capsule, Take 2 capsules (500 mg total) by mouth 4 (four) times a day for 7 days, Disp: 56 capsule, Rfl: 0    furosemide (LASIX) 20 mg tablet, Take 20 mg by mouth daily, Disp: , Rfl:     levothyroxine 88 mcg tablet, Take 88 mcg by mouth, Disp: , Rfl:     metoprolol succinate (TOPROL-XL) 100 mg 24 hr tablet, Take 100 mg by mouth, Disp: , Rfl:     Multiple Vitamins-Minerals (CERTAVITE SENIOR/ANTIOXIDANT PO), Take by mouth, Disp: , Rfl:     warfarin (COUMADIN) 2 5 mg tablet, Take 2 5 mg daily at dinner until changed by your physician adjust your dose, Disp: 30 tablet, Rfl: 0  No current facility-administered medications for this visit       ALLERGIES:  No Known Allergies    REVIEW OF SYSTEMS:  General: no fever, no chills  HEENT:  No loss of hearing or eyesight problems  Eyes:  No red eyes  Respiratory:  No coughing, shortness of breath or wheezing  Cardiovascular:  No chest pain, no palpitations  GI:  Abdomen soft nontender, denies nausea  Endocrine:  No muscle weakness, no frequent urination, no excessive thirst  Urinary:  No dysuria, no incontinence  Musculoskeletal: see HPI and PE  SKIN:  No skin rash, no dry skin  Neurological:  No headaches, no confusion  Psychiatric:  No suicide thoughts, no anxiety, no depression  Review of all other systems is negative    LABS:  HgA1c: No results found for: HGBA1C  BMP:   Lab Results   Component Value Date    GLUCOSE 115 07/30/2018    CALCIUM 9 1 07/30/2018     (L) 07/30/2018    K 3 7 07/30/2018    CO2 31 07/30/2018    CL 98 (L) 07/30/2018    BUN 16 07/30/2018    CREATININE 1 13 07/30/2018       _____________________________________________________  PHYSICAL EXAMINATION:  General: well developed and well nourished, alert, oriented times 3 and appears comfortable  Psychiatric: Normal  HEENT: Trachea Midline, No torticollis  Cardiovascular:  Regular rate and rhythm  Pulmonary:  Clear to auscultation no wheezing or rhonchi  Skin: No masses, erthema, lacerations, fluctation, ulcerations  Neurovascular: Sensation Intact to light touch right foot  Motor - normal strength and ROM of right ankle and toes  MUSCULOSKELETAL EXAMINATION:  Right ankle:  Erythema noted around the incision  Moderate amount of swelling  Active drainage from the incision that was purulent in nature  _____________________________________________________  STUDIES REVIEWED:  X-rays 7/30/2018 right ankle - Healed bimalleolar fracture s/p internal fixation  Fixation intact  No periosteal reaction or erosions in bone  Positive osteopenia  Positive soft tissue swelling (however patient is morbidly obese)  ASSESSMENT/PLAN:      Assessment:   Wound infection right ankle status post ORIF ankle fracture    Plan: We will have the patient get scheduled for a right ankle I and D and removal of hardware on 8/2/2018  Risks and benefits of surgery were discussed with the patient  Patient understands risks and wishes to proceed  All questions were answered to her satisfaction  Follow Up:   After surgery

## 2018-08-02 NOTE — ANESTHESIA POSTPROCEDURE EVALUATION
Post-Op Assessment Note      CV Status:  Stable    Mental Status:  Alert and awake    Hydration Status:  Euvolemic    PONV Controlled:  Controlled    Airway Patency:  Patent    Post Op Vitals Reviewed: Yes          Staff: CRNA           BP   122/66   Temp   99 3   Pulse 89   Resp   16   SpO2   91%

## 2018-08-03 LAB
ALBUMIN SERPL BCP-MCNC: 2.6 G/DL (ref 3.5–5)
ALP SERPL-CCNC: 60 U/L (ref 46–116)
ALT SERPL W P-5'-P-CCNC: 15 U/L (ref 12–78)
ANION GAP SERPL CALCULATED.3IONS-SCNC: 4 MMOL/L (ref 4–13)
AST SERPL W P-5'-P-CCNC: 16 U/L (ref 5–45)
BILIRUB SERPL-MCNC: 0.3 MG/DL (ref 0.2–1)
BUN SERPL-MCNC: 14 MG/DL (ref 5–25)
CALCIUM SERPL-MCNC: 8.5 MG/DL (ref 8.3–10.1)
CHLORIDE SERPL-SCNC: 102 MMOL/L (ref 100–108)
CO2 SERPL-SCNC: 29 MMOL/L (ref 21–32)
CREAT SERPL-MCNC: 0.89 MG/DL (ref 0.6–1.3)
GFR SERPL CREATININE-BSD FRML MDRD: 61 ML/MIN/1.73SQ M
GLUCOSE SERPL-MCNC: 93 MG/DL (ref 65–140)
INR PPP: 1.76 (ref 0.86–1.17)
POTASSIUM SERPL-SCNC: 4.3 MMOL/L (ref 3.5–5.3)
PROT SERPL-MCNC: 6 G/DL (ref 6.4–8.2)
PROTHROMBIN TIME: 20.3 SECONDS (ref 11.8–14.2)
SODIUM SERPL-SCNC: 135 MMOL/L (ref 136–145)

## 2018-08-03 PROCEDURE — 99222 1ST HOSP IP/OBS MODERATE 55: CPT | Performed by: INTERNAL MEDICINE

## 2018-08-03 PROCEDURE — G8988 SELF CARE GOAL STATUS: HCPCS

## 2018-08-03 PROCEDURE — 85610 PROTHROMBIN TIME: CPT | Performed by: PHYSICIAN ASSISTANT

## 2018-08-03 PROCEDURE — G8987 SELF CARE CURRENT STATUS: HCPCS

## 2018-08-03 PROCEDURE — 97163 PT EVAL HIGH COMPLEX 45 MIN: CPT

## 2018-08-03 PROCEDURE — 99024 POSTOP FOLLOW-UP VISIT: CPT | Performed by: PHYSICIAN ASSISTANT

## 2018-08-03 PROCEDURE — 97167 OT EVAL HIGH COMPLEX 60 MIN: CPT

## 2018-08-03 PROCEDURE — G8979 MOBILITY GOAL STATUS: HCPCS

## 2018-08-03 PROCEDURE — 80053 COMPREHEN METABOLIC PANEL: CPT | Performed by: ORTHOPAEDIC SURGERY

## 2018-08-03 PROCEDURE — G8978 MOBILITY CURRENT STATUS: HCPCS

## 2018-08-03 RX ADMIN — HYDROMORPHONE HYDROCHLORIDE 2 MG: 2 TABLET ORAL at 20:19

## 2018-08-03 RX ADMIN — FUROSEMIDE 20 MG: 20 TABLET ORAL at 10:23

## 2018-08-03 RX ADMIN — VANCOMYCIN HYDROCHLORIDE 500 MG: 500 INJECTION, SOLUTION INTRAVENOUS at 04:41

## 2018-08-03 RX ADMIN — ASPIRIN 81 MG 81 MG: 81 TABLET ORAL at 10:24

## 2018-08-03 RX ADMIN — Medication 1 TABLET: at 13:52

## 2018-08-03 RX ADMIN — CEFAZOLIN SODIUM 2000 MG: 2 SOLUTION INTRAVENOUS at 05:18

## 2018-08-03 RX ADMIN — LISINOPRIL 20 MG: 20 TABLET ORAL at 10:23

## 2018-08-03 RX ADMIN — CEFAZOLIN SODIUM 2000 MG: 2 SOLUTION INTRAVENOUS at 13:52

## 2018-08-03 RX ADMIN — AMLODIPINE BESYLATE 5 MG: 5 TABLET ORAL at 10:24

## 2018-08-03 RX ADMIN — ATORVASTATIN CALCIUM 10 MG: 10 TABLET, FILM COATED ORAL at 17:27

## 2018-08-03 RX ADMIN — WARFARIN SODIUM 2.5 MG: 2.5 TABLET ORAL at 17:27

## 2018-08-03 RX ADMIN — LEVOTHYROXINE SODIUM 88 MCG: 88 TABLET ORAL at 06:31

## 2018-08-03 RX ADMIN — METOPROLOL SUCCINATE 100 MG: 100 TABLET, EXTENDED RELEASE ORAL at 10:23

## 2018-08-03 RX ADMIN — HYDROMORPHONE HYDROCHLORIDE 2 MG: 2 TABLET ORAL at 10:24

## 2018-08-03 NOTE — SOCIAL WORK
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  Pt drives  CM discussed dcp and pt refuses rehab and [facility]  Pt explained she fell back in March and returned to have infected hardware removed  Pt stated, "I'm sorry for being like this but I'm going home"  Pt further explained she was away from home two months after fall in March and she is not willing to go to rehab, [facility] or go to [facility]  Pt feels she can rehab herself and plans to return to work  CM reviewed discharge planning process including the following: identifying help at home, patient preference for discharge planning needs, pharmacy preference, and availability of treatment team to discuss questions or concerns patient and/or family may have regarding understanding medications and recognizing signs and symptoms once discharged  CM also encouraged patient to follow up with all recommended appointments after discharge  Patient advised of importance for patient and family to participate in managing patients medical well being  CM name and role reviewed and Discharge Checklist provided  CM provided warm hand off to [facility] [place] [address]  Pt has no needs at this time  CM department to follow pt through dc

## 2018-08-03 NOTE — PLAN OF CARE
Problem: PHYSICAL THERAPY ADULT  Goal: Performs mobility at highest level of function for planned discharge setting  See evaluation for individualized goals  Treatment/Interventions: Functional transfer training, LE strengthening/ROM, Elevations, Therapeutic exercise, Endurance training, Patient/family training, Equipment eval/education, Bed mobility, Gait training, Continued evaluation, Spoke to nursing, OT  Equipment Recommended: Conan Boxer (at minimum currently; pt may benefit from sliding board)       See flowsheet documentation for full assessment, interventions and recommendations  Prognosis: Fair  Problem List: Decreased strength, Decreased range of motion, Decreased endurance, Impaired balance, Decreased mobility, Orthopedic restrictions, Pain, Decreased skin integrity  Assessment: Pt is [de-identified] y o  female seen for PT evaluation on 8/3/2018 s/p admit to [facility] on 8/2/2018 w/ Status post ORIF of fracture of ankle  Pt presented to ED w/ persistent drainage from surgical wound s/p ankle repair w/ placement of hardware performed in March 2018  Pt underwent hardware removal and I&D of R ankle wound 8/2/18 with orthopedic surgeron  PT consulted to assess pt's functional mobility and d/c needs  Order placed for PT eval and tx, w/ up w/ A and PWB R LE order  Performed at least 2 patient identifiers during session: Name and wristband  Comorbidities affecting pt's physical performance at time of assessment include: AFib, hyperlipidemia, HTN, hypothyroidism, CKD stage3  PTA, pt was independent w/ all functional mobility w/ RW vs  SPC vs  furniture cruising, ambulates household distances, has 3 YURY, lives w/ spouse in 1 level home and retired   Personal factors affecting pt at time of IE include: ambulating w/ assistive device, stairs to enter home, inability to navigate level surfaces w/o external assistance, unable to perform dynamic tasks in community, limited home support, positive fall history, inability to perform current job functions, unable to perform physical activity, limited insight into impairments, inability to perform IADLs and inability to perform ADLs  Please find objective findings from PT assessment regarding body systems outlined above with impairments and limitations including weakness, impaired balance, decreased endurance, gait deviations, pain, decreased activity tolerance, decreased functional mobility tolerance, decreased safety awareness, fall risk, orthopedic restrictions and decreased skin integrity, as well as mobility assessment (need for min/mod assist w/ all phases of mobility when usually ambulating independently)  The following objective measures performed on IE also reveal limitations: Barthel Index: 45/100  Pt's clinical presentation is currently unstable/unpredictable seen in pt's presentation of severe pain impacting overall mobility status and ongoing medical assessment  Pt to benefit from continued PT tx to address deficits as defined above and maximize level of functional independent mobility and consistency  From PT/mobility standpoint, recommendation at time of d/c would be STR pending progress in order to facilitate return to PLOF  Barriers to Discharge: Inaccessible home environment, Decreased caregiver support (pt reporting  will not provide A)     Recommendation: Short-term skilled PT     PT - OK to Discharge: Yes (if to STR when medically cleared)    See flowsheet documentation for full assessment

## 2018-08-03 NOTE — OP NOTE
OPERATIVE REPORT    Patient Name: Gregg Oquendo    :  1935  MRN: [id number]  Pt Location: [facility] ROOM 03    Surgery Date:   2018    Surgeon(s) and Role:     * Elio Gleason MD - Primary     * Mary Goodwin PA-C - Assisting    Preop Diagnosis:  Status post ORIF of fracture of ankle [Z96 7, Z87 81]  Post-Op Diagnosis Codes:     * Status post ORIF of fracture of ankle [Z96 7, Z87 81]  Procedure(s) (LRB):  Right ankle incision and drainage (Right)  REMOVAL HARDWARE ANKLE (Right)    Specimen(s):  ID Type Source Tests Collected by Time Destination   A : right Ankle wound Tissue Soft Tissue, Other ANAEROBIC CULTURE AND GRAM STAIN, CULTURE, TISSUE AND GRAM STAIN Elio Gleason MD 2018 1334    B : Right Sarah Wound-Deep Tissue Soft Tissue, Other ANAEROBIC CULTURE AND GRAM STAIN, CULTURE, TISSUE AND GRAM STAIN Elio Gleason MD 2018 1341    C : Right Ankle Wound-Deep Screw Hole Tissue Soft Tissue, Other ANAEROBIC CULTURE AND GRAM STAIN, CULTURE, TISSUE AND GRAM STAIN Elio Gleason MD 2018 1347    D : Right Ankle-Superficial Tissue Soft Tissue, Other ANAEROBIC CULTURE AND GRAM STAIN, CULTURE, TISSUE AND GRAM STAIN Elio Gleason MD 2018 1350        Estimated Blood Loss:   Minimal    Drains:  Closed/Suction Drain Right Other (Comment) Bulb 10 Fr  (Active)   Site Description Unable to view 2018  6:01 PM   Dressing Status Clean;Dry; Intact 2018  6:01 PM   Drainage Appearance Bloody;Bright red 2018  6:01 PM   Output (mL) 15 mL 2018  6:01 PM   Number of days: 1       Implants:   * No implants in log *    Anesthesia Type:   General    Operative Indications:  Status post ORIF of fracture of ankle [Z96 7, Z87 81]    Operative Findings:  No abscess or collection (Prior spontaneous drainage)  Positive communication to hardware  Some necrotic tissue  Bone not soft  Excellent screw purchase    Doubt osteomyelitis  Fracture healed    Complications:   None    Procedure and Technique:  Patient had the right ankle marked in the preoperative area  Risks and benefits of procedure discussed and patient agreed to proceed  Patient taken to the operating room  She did receive preop Ancef immediately prior to surgery  She was placed on the operating room table in the supine position  General anesthesia administered  Tourniquet placed on the right thigh but not inflated  Right leg ankle and foot prepped and draped in the usual sterile  Time-out performed  Incision made through patient's scar and including the two sinus tracts were patient had spontaneous drainage  Sinus tracts excised  Some of the excised tissue and some necrotic fatty tissue sent for culture  Swab cultures also sent  There did seem to be a tract down to the fibular plate  No purulent complexion found  At the fibular plate there was a 4 5 mm area of necrotic tissue  The remainder of the plate was covered with dense scar the necrotic tissue was excised and sent for culture  The wound was irrigated with 3 L normal saline  Then the remainder of the plate exposed and 5 screws removed and the plate removed  Each screw had excellent purchase  The bone was not soft  Screw holes were carotid and some tissue from curetting sent  The wound was irrigated with another 3 L normal saline  Some vancomycin powder was placed in the wound  A #10 Flat drain  Placed in the wound and then the wound closed with vertical mattress nylon stitches  Dry sterile dressing applied  Patient tolerated the procedure well  No complications  She went to recovery room in stable condition         I was present for the entire procedure    Patient Disposition:  PACU     SIGNATURE: Sonya Lanier MD  DATE: August 3, 2018  TIME: 6:23 AM      Portions of the record may have been created with voice recognition software   Occasional wrong word or "sound a like" substitutions may have occurred due to the inherent limitations of voice recognition software   Read the chart carefully and recognize, using context, where substitutions have occurred

## 2018-08-03 NOTE — CONSULTS
Consultation - Infectious Disease   Debara Harada [de-identified] y o  female MRN: [id number]  Unit/Bed#: -01 Encounter: [id number]      IMPRESSION & RECOMMENDATIONS:   Impression/Recommendations:  1  Right ankle hardware infection  Status post removal of hardware on 08/02  Necrotic tissue was debrided  No gross purulence or obvious evidence of osteomyelitis  However, in the setting of hardware infection, patient will likely require prolonged course of antibiotic  Fortunately, she remains clinically well, afebrile  Will await operative cultures to guide antibiotic recommendations     -continue with IV cefazolin at current dose for now  -followup operative cultures to guide antibiotic recommendations  -will likely require 6 week course of antibiotic, which will be determined based on operative cultures  -serial exams    2  History of right ankle fracture with ORIF in March 2018  Antibiotics:  Cefazolin D2    Discussed above plan in detail with patient, in with orthopedic service  Thank you for this consultation  We will follow along with you  Will plan to see patient again on 08/06  Please call with any questions in the interim  HISTORY OF PRESENT ILLNESS:  Reason for Consult:  Left ankle hardware infection    HPI: Debara Harada is a [de-identified] y o  female with history of right ankle fracture who underwent ORIF in March 2018 with placement of screws and a plate, with nonhealing right ankle wound and recently worsening right ankle swelling, purulent drainage  Patient was seen by Orthopedic surgery as outpatient on 07/31/2018 with complaint of worsening right ankle swelling, purulent drainage  Patient denied associated fevers, chills  The decision was made to admit the patient for right ankle I and D and removal of hardware  On 08/02, patient underwent right ankle I and D of necrotic tissue with removal of plate and 5 screws  No obvious abscess or purulence was noted  Fracture appeared healed    The bone did not appear soft with no obvious evidence of osteomyelitis  Multiple OR cultures were sent and are pending  Infectious Diseases is consulted for antibiotic guidance  REVIEW OF SYSTEMS:  A complete system-based review of systems is otherwise negative  PAST MEDICAL HISTORY:  Past Medical History:   Diagnosis Date    Atrial fibrillation ([address] )     Disease of thyroid gland     Hyperlipidemia     Hypertension      Past Surgical History:   Procedure Laterality Date    ORIF TIBIA & FIBULA FRACTURES Right 3/12/2018    Procedure: OPEN REDUCTION W/ INTERNAL FIXATION (ORIF) ANKLE  Open reduction internal fixation lateral malleolus with possible fixation of medial malleolus;  Surgeon: Maryam Mclain MD;  [facility]: [facility];  Service: Orthopedics    AR I&D SOFT TISSUE ABSCESS SUBFASCIAL Right 2018    Procedure: Right ankle incision and drainage;  Surgeon: Maryam Mclain MD;  [facility];  Service: Orthopedics    AR REMOVAL DEEP IMPLANT Right 2018    Procedure: REMOVAL HARDWARE ANKLE;  Surgeon: Maryam Mclain MD;  [facility];  Service: Orthopedics    TOTAL THYROIDECTOMY         FAMILY HISTORY:  Non-contributory    SOCIAL HISTORY:  History   Alcohol Use No     History   Drug Use No     History   Smoking Status    Never Smoker   Smokeless Tobacco    Never Used       ALLERGIES:  No Known Allergies    MEDICATIONS:  All current active medications have been reviewed      PHYSICAL EXAM:  Vitals:  HR:  [] 78  Resp:  [16-26] 18  BP: (104-142)/(59-94) 113/64  SpO2:  [92 %-99 %] 93 %  Temp (24hrs), Av 3 °F (36 8 °C), Min:97 6 °F (36 4 °C), Max:99 3 °F (37 4 °C)  Current: Temperature: 98 6 °F (37 °C)     Physical Exam:  General:  No acute distress, elderly  Eyes:  Conjunctive clear with no hemorrhages or effusions  Oropharynx:  No ulcers, no lesions  Neck:  Supple, no lymphadenopathy  Lungs:  Clear to auscultation bilaterally, no accessory muscle use  Cardiac:  Regular rate and rhythm, no murmurs  Abdomen:  Soft, non-tender, non-distended  Extremities:  No peripheral cyanosis, clubbing, or edema  Right foot dressing intact  Skin:  No rashes, no ulcers  Neurological:  Moves all four extremities spontaneously, sensation grossly intact    LABS, IMAGING, & OTHER STUDIES:  Lab Results:  I have personally reviewed pertinent labs  Results from last 7 days  Lab Units 08/03/18  0458 07/30/18  1719   SODIUM mmol/L 135* 134*   POTASSIUM mmol/L 4 3 3 7   CHLORIDE mmol/L 102 98*   CO2 mmol/L 29 31   ANION GAP mmol/L 4 5   BUN mg/dL 14 16   CREATININE mg/dL 0 89 1 13   EGFR ml/min/1 73sq m 61 46   GLUCOSE RANDOM mg/dL 93 115   CALCIUM mg/dL 8 5 9 1   AST U/L 16 20   ALT U/L 15 10*   ALK PHOS U/L 60 85   TOTAL PROTEIN g/dL 6 0* 8 0   BILIRUBIN TOTAL mg/dL 0 30 0 80       Results from last 7 days  Lab Units 07/30/18  1719   WBC Thousand/uL 11 27*   HEMOGLOBIN g/dL 13 6   PLATELETS Thousands/uL 234           Imaging Studies:   I have personally reviewed pertinent imaging study reports and images in PACS  Right ankle x-ray shows soft tissue swelling  EKG, Pathology, and Other Studies:   I have personally reviewed pertinent reports

## 2018-08-03 NOTE — PHYSICAL THERAPY NOTE
Physical Therapy Evaluation     Patient's Name: Victor Manuel Lozano    Admitting Diagnosis  Status post ORIF of fracture of ankle [Z96 7, Z87 81]    Problem List  Patient Active Problem List   Diagnosis    Bimalleolar fracture of right ankle    Atrial fibrillation ([facility] )    Fracture of distal end of left radius    Hypertension    Hyperlipidemia    Post-surgical hypothyroidism    CKD (chronic kidney disease), stage III    Leucocytosis    Ankle fracture, bimalleolar, closed, right, initial encounter    Constipation    Nocturnal hypoxia    Status post ORIF of fracture of ankle       Past Medical History  Past Medical History:   Diagnosis Date    Atrial fibrillation ([facility] )     Disease of thyroid gland     Hyperlipidemia     Hypertension        Past Surgical History  Past Surgical History:   Procedure Laterality Date    ORIF TIBIA & FIBULA FRACTURES Right 3/12/2018    Procedure: OPEN REDUCTION W/ INTERNAL FIXATION (ORIF) ANKLE  Open reduction internal fixation lateral malleolus with possible fixation of medial malleolus;  Surgeon: Jason Rasmussen MD;  [facility];  Service: Orthopedics    CT I&D SOFT TISSUE ABSCESS SUBFASCIAL Right 8/2/2018    Procedure: Right ankle incision and drainage;  Surgeon: Jason Rasmussen MD;  [facility];  Service: Orthopedics    CT REMOVAL DEEP IMPLANT Right 8/2/2018    Procedure: REMOVAL HARDWARE ANKLE;  Surgeon: Jason Rasmussen MD;  [facility];  Service: Orthopedics    TOTAL THYROIDECTOMY          08/03/18 1035   Note Type   Note type Eval only   Pain Assessment   Pain Assessment 0-10   Pain Score 9   Pain Type Chronic pain   Pain Location Knee   Pain Orientation Right   Pain Descriptors Aching;Cramping   Pain Onset Ongoing   Clinical Progression Not changed   Home Living   Type of Home House  (1 small step to get into kitchen)   Home Layout Two level;Performs ADLs on one level; Able to live on main level with bedroom/bathroom;Stairs to enter without rails  (1st floor setup; 2 small YURY + 1)   Bathroom Shower/Tub Walk-in shower  (on 1st level)   Bathroom Toilet Raised   Bathroom Equipment Grab bars in shower; Shower chair; Toilet raiser;Grab bars around toilet   Bathroom Accessibility Accessible;Accessible via walker   [address];[facility] for when she works & up/down steps; RW at home)   Additional Comments pt admits to furniture cruising as well in her home; pt wants to get electric lift recliner   Prior Function   Level of [place] Independent with ADLs and functional mobility   Lives With Spouse  (pt reporting her  could not provide A to her)   Receives Help From Family  (sister lives in [place])   [address] in the last 6 months 1 to 4   Vocational Part time employment  (pt works 28 hrs/wk at [employer])   Comments pt drives   Restrictions/Precautions   [place] Bearing Precautions Per Order Yes   RLE [place] Bearing Per Order PWB  (per Neal Roldan PARamonC: PWB through the heel)   Braces or Orthoses (R ankle wrapped w/ ACE wrap)   Other Precautions WBS; Fall Risk;Pain  (SHELDON drain R ankle)   General   Family/Caregiver Present No   Cognition   Overall Cognitive Status WFL   Arousal/Participation Alert   Orientation Level Oriented X4   Memory Within functional limits   Following Commands Follows all commands and directions without difficulty   RUE Assessment   RUE Assessment (refer to OT eval for details)   LUE Assessment   LUE Assessment (refer to OT eval for details)   RLE Assessment   RLE Assessment X   Strength RLE   R Hip Flexion 4/5   R Knee Extension 4-/5   R Ankle Dorsiflexion (unable to test)   LLE Assessment   LLE Assessment WFL   Strength LLE   L Hip Flexion 4/5   L Knee Extension 4+/5   L Ankle Dorsiflexion 4+/5   Coordination   Movements are Fluid and Coordinated 1   Sensation WFL  (pt denies any numbness/tingling)   Bed Mobility   Rolling R Unable to assess  (pt received OOB in recliner)   Additional Comments Per nsg staff, pt's VENECIA Pedroza- pt required min/mod A x 1 w/ use of RW for bed mobility and functional transfers (STS and SPT) onto commode w/ nsg staff earlier this date  PT unable to mobilize pt at this time 2/2 refusal as pt is having increased pain in R knee currently   Transfers   Sit to Stand 3  Moderate assistance  (min/mod)   Additional items Assist x 1  (per nsg staff Lizbet CUADRA)   Stand to Sit 3  Moderate assistance  (min/mod)   Additional items Assist x 1  (per nsg staff Eros Pedroza RN)   Ambulation/Elevation   Gait pattern Not tested  (pt refusing to trial at this time)   Balance   Static Sitting Fair +   Dynamic Sitting Fair   Static Standing (unable to assess)   Dynamic Standing (unable to assess)   Ambulatory (unable to assess)   Endurance Deficit   Endurance Deficit Yes   Activity Tolerance   Activity Tolerance Patient limited by fatigue;Patient limited by pain   Medical Staff Made Aware pt w/ up w/ A and PWB RLE orders; OT Romelia Vazquez present during co-eval   Nurse Made Aware Yes, VENECIA Pedroza verbalized pt appropriate for PT session, made aware of outcomes/recs   Assessment   Prognosis Fair   Problem List Decreased strength;Decreased range of motion;Decreased endurance; Impaired balance;Decreased mobility;Orthopedic restrictions;Pain;Decreased skin integrity   Assessment Pt is [de-identified] y o  female seen for PT evaluation on 8/3/2018 s/p admit to [facility] on 8/2/2018 w/ Status post ORIF of fracture of ankle  Pt presented to ED w/ persistent drainage from surgical wound s/p ankle repair w/ placement of hardware performed in March 2018  Pt underwent hardware removal and I&D of R ankle wound 8/2/18 with orthopedic surgeron  PT consulted to assess pt's functional mobility and d/c needs  Order placed for PT eval and tx, w/ up w/ A and PWB R LE order  Performed at least 2 patient identifiers during session: Name and wristband   Comorbidities affecting pt's physical performance at time of assessment include: AFib, hyperlipidemia, HTN, hypothyroidism, CKD stage3  PTA, pt was independent w/ all functional mobility w/ RW vs  SPC vs  furniture cruising, ambulates household distances, has 3 YURY, lives w/ spouse in 1 level home and retired  Personal factors affecting pt at time of IE include: ambulating w/ assistive device, stairs to enter home, inability to navigate level surfaces w/o external assistance, unable to perform dynamic tasks in community, limited home support, positive fall history, inability to perform current job functions, unable to perform physical activity, limited insight into impairments, inability to perform IADLs and inability to perform ADLs  Please find objective findings from PT assessment regarding body systems outlined above with impairments and limitations including weakness, impaired balance, decreased endurance, gait deviations, pain, decreased activity tolerance, decreased functional mobility tolerance, decreased safety awareness, fall risk, orthopedic restrictions and decreased skin integrity, as well as mobility assessment (need for min/mod assist w/ all phases of mobility when usually ambulating independently)  The following objective measures performed on IE also reveal limitations: Barthel Index: 45/100  Pt's clinical presentation is currently unstable/unpredictable seen in pt's presentation of severe pain impacting overall mobility status and ongoing medical assessment  Pt to benefit from continued PT tx to address deficits as defined above and maximize level of functional independent mobility and consistency  From PT/mobility standpoint, recommendation at time of d/c would be STR pending progress in order to facilitate return to PLOF     Barriers to Discharge Inaccessible home environment;Decreased caregiver support  (pt reporting  will not provide A)   Goals   Patient Goals "to return home"   STG Expiration Date 08/13/18   Short Term Goal #1 In 7-10 days: Increase bilateral LE strength 1/2 grade to facilitate independent mobility, Perform all bed mobility tasks modified independent to decrease caregiver burden, Perform all transfers modified independent to improve independence, Ambulate > 50 ft  with RW modified independent w/o LOB and w/ normalized gait pattern 100% of the time, Navigate 3 stairs modified independent with unilateral handrail to facilitate return to previous living environment, Increase all balance 1/2 grade to decrease risk for falls, Complete exercise program independently and Tolerate 4 hr OOB to faciliate upright tolerance   Treatment Day 0   Plan   Treatment/Interventions Functional transfer training;LE strengthening/ROM; Elevations; Therapeutic exercise; Endurance training;Patient/family training;Equipment eval/education; Bed mobility;Gait training;Continued evaluation;Spoke to nursing;OT   PT Frequency 5x/wk; Weekend  (+1x/weekend)   Recommendation   Recommendation Short-term skilled PT   Equipment Recommended Walker  (at minimum currently; pt may benefit from sliding board)   PT - OK to Discharge Yes  (if to STR when medically cleared)   Barthel Index   Feeding 10   Bathing 0   Grooming Score 0   Dressing Score 5   Bladder Score 10   Bowels Score 10   Toilet Use Score 5   Transfers (Bed/Chair) Score 5   Mobility (Level Surface) Score 0   Stairs Score 0   Barthel Index Score 45         Long Jones PT

## 2018-08-03 NOTE — CASE MANAGEMENT
Initial Clinical Review    Age/Sex: [de-identified] y o  female    Surgery Date: 8/2/18 FOR ELECTIVE SURGERY    Procedure: Procedure(s) (LRB): Right ankle incision and drainage (Right)  REMOVAL HARDWARE ANKLE (Right)      Anesthesia: General    Admission Orders: Date/Time/Statement: 8/2/18 @ 200 INPATIENT    Orders Placed This Encounter   Procedures    Inpatient Admission     Standing Status:   Standing     Number of Occurrences:   1     Order Specific Question:   Admitting Physician     Answer:   Sebastian Mcleod [[id number]]     Order Specific Question:   Level of Care     Answer:   Med Surg [16]     Order Specific Question:   Bed Type     Answer:   Miguel [4]     Order Specific Question:   Estimated length of stay     Answer:   More than 2 Midnights     Order Specific Question:   Certification     Answer:   I certify that inpatient services are medically necessary for this patient for a duration of greater than two midnights  See H&P and MD Progress Notes for additional information about the patient's course of treatment  Vital Signs: /64 (BP Location: Left arm)   Pulse 78   Temp 98 6 °F (37 °C) (Oral)   Resp 18   Ht 5' 4" (1 626 m)   Wt 83 2 kg (183 lb 6 8 oz)   SpO2 93%   BMI 31 48 kg/m²       Chaz Snow is a [de-identified]y o  year old female who is 4 months s/p ORIF right ankle  She presents with swelling and drainage in the area of her lateral incision  She states yesterday that she went to the emergency department the for the purulent drainage and some erythema  Wound had previously healed  However she did have some post op drainage earlier and had treated it with dry sterile dressing and Neosporin  She denies any numbness or tingling  She denies any constitutional signs or symptoms  Admission was recommended on 7/30 but patient refused  She refuses again today but is willing to present for hardware removal and I and D of right ankle wound on 8/2/2018  She is taking PO antibiotics            Diet Orders Start     Ordered    08/02/18 1628  Diet Regular; Regular House  Diet effective now     Question Answer Comment   Diet Type Regular    Regular Regular House        08/02/18 1627          Mobility: Up with assistance, partial weight bearing        DVT Prophylaxis: Sequential compression device  Scheduled Meds:  Current Facility-Administered Medications:  acetaminophen 325 mg Oral Q6H PRN Stephen Barbosa MD   amLODIPine 5 mg Oral Daily Stephen Barbosa MD   aspirin 81 mg Oral Daily Stephen Barbosa MD   atorvastatin 10 mg Oral Daily With Pro Leon MD   furosemide 20 mg Oral Daily Stephen Barbosa MD   HYDROmorphone 2 mg Oral Q4H PRN Stephen Barbosa MD   levothyroxine 88 mcg Oral Early Morning Stephen Barbosa MD   lisinopril 20 mg Oral Daily Stephen Barbosa MD   metoprolol succinate 100 mg Oral Daily Stephen Barbosa MD   multivitamin-minerals 1 tablet Oral Daily Stephen Barbosa MD   warfarin 2 5 mg Oral Daily (warfarin) Stephen Barbosa MD       PT/OT eval and treat  Incentive Spirometry

## 2018-08-03 NOTE — PROGRESS NOTES
Orthopedics   Manny Yanez [de-identified] y o  female MRN: [id number]  Unit/Bed#: -01    Subjective:  [de-identified] y  o female post operative day 1 status post right Ankle I and D and removal of hardware  Pt doing well  Pain controlled  Patient continues to keep the extremity elevated  There is some output from her drain this morning  She denies any numbness or tingling  She denies any constitutional signs or symptoms      Labs:    0  Lab Value Date/Time   HCT 41 3 07/30/2018 1719   HCT 33 1 (L) 03/26/2018 0501   HCT 28 7 (L) 03/19/2018 0618   HGB 13 6 07/30/2018 1719   HGB 10 3 (L) 03/26/2018 0501   HGB 9 1 (L) 03/19/2018 0618   INR 1 49 (H) 08/02/2018 1157   WBC 11 27 (H) 07/30/2018 1719   WBC 7 62 03/26/2018 0501   WBC 8 25 03/19/2018 0618   ESR 21 (H) 07/30/2018 1719   CRP >90 0 (H) 07/30/2018 1719       Meds:    Current Facility-Administered Medications:     acetaminophen (TYLENOL) tablet 325 mg, 325 mg, Oral, Q6H PRN, Lamon Lundborg, MD    amLODIPine (NORVASC) tablet 5 mg, 5 mg, Oral, Daily, Lamon Lundborg, MD    aspirin chewable tablet 81 mg, 81 mg, Oral, Daily, Curt Caputo MD    atorvastatin (LIPITOR) tablet 10 mg, 10 mg, Oral, Daily With Josefina Bell MD, 10 mg at 08/02/18 1751    ceFAZolin (ANCEF) IVPB (premix) 2,000 mg, 2,000 mg, Intravenous, Q8H, Lamon Lundborg, MD, Last Rate: 100 mL/hr at 08/03/18 0518, 2,000 mg at 08/03/18 0518    furosemide (LASIX) tablet 20 mg, 20 mg, Oral, Daily, Lamon Lundborg, MD    HYDROmorphone (DILAUDID) tablet 2 mg, 2 mg, Oral, Q4H PRN, Lamon Lundborg, MD, 2 mg at 08/02/18 1802    levothyroxine tablet 88 mcg, 88 mcg, Oral, Early Morning, Lamon Lundborg, MD, 88 mcg at 08/03/18 0631    lisinopril (ZESTRIL) tablet 20 mg, 20 mg, Oral, Daily, Lamon Lundborg, MD    metoprolol succinate (TOPROL-XL) 24 hr tablet 100 mg, 100 mg, Oral, Daily, Lamon Lundborg, MD    multivitamin-minerals (CENTRUM) tablet 1 tablet, 1 tablet, Oral, Daily, Lamon Lundborg, MD Mayme Gillis warfarin (COUMADIN) tablet 2 5 mg, 2 5 mg, Oral, Daily (warfarin), Joseph Pruitt MD, 2 5 mg at 08/02/18 1751    Blood Culture:   No results found for: BLOODCX    Wound Culture:   No results found for: WOUNDCULT    Ins and Outs:  I/O last 24 hours: In: 400 [I V :400]  Out: 320 [Urine:300; Drains:20]          Physical:  Vitals:    08/03/18 0300   BP: 110/62   Pulse: 80   Resp: 18   Temp: 98 7 °F (37 1 °C)   SpO2: 92%     right lower extremity I&D, removal of hardware  · Incision clean dry and intact  New dressing applied with 4x4s Webril ABD and drain sponge  · Drainage output was 20 cc/shift currently 20 cc in the drain  · Redness around the incision has decreased significantly  · 4/5 strength to EHL/FHL  · Sensation intact L1-S1  · +2 DP Pulse    _*_*_*_*_*_*_*_*_*_*_*_*_*_*_*_*_*_*_*_*_*_*_*_*_*_*_*_*_*_*_*_*_*_*_*_*_*_*_*_*_*    Assessment:   Post operative day 1 status post right Ankle I&D, removal of hardware Doing well  Plan:  · weight bearing right lower extremity  · Up out of bed  · Ice, Elevation to affected extremity  · Analgesics  · DVT prophylaxis  · Dispo: Ortho will follow  · Will continue to assess for acute blood loss anemia   · Wound cultures still in process  · Awaiting Infectious Disease consult for recommendations of IV versus oral antibiotics    Myesha Shearer PA-C

## 2018-08-03 NOTE — PLAN OF CARE
Problem: OCCUPATIONAL THERAPY ADULT  Goal: Performs self-care activities at highest level of function for planned discharge setting  See evaluation for individualized goals  Treatment Interventions: ADL retraining, Functional transfer training, UE strengthening/ROM, Endurance training, Patient/family training, Equipment evaluation/education, Compensatory technique education, Continued evaluation, Cardiac education, Energy conservation, Activityengagement  Equipment Recommended: Bedside commode       See flowsheet documentation for full assessment, interventions and recommendations  Limitation: Decreased ADL status, Decreased UE ROM, Decreased UE strength, Decreased Safe judgement during ADL, Decreased endurance, Decreased self-care trans, Decreased high-level ADLs  Prognosis: Good  Assessment: Patient is a [de-identified] y o  female seen for OT evaluation s/p admit to [address] [facility] on 8/2/2018 w/Status post ORIF of fracture of ankle  Pt presented to ED w/ persistent drainage from surgical wound s/p ankle repair w/ placement of hardware performed in March 2018  Pt underwent hardware removal and I&D of R ankle  wound 8/2/18 with orthopedic surgeron  Commorbidities affecting patient's functional performance at time of assessment include: AFib, hyperlipidemia, HTN, hypothyroidism, CKD stage3  Orders placed for OT evaluation and treatment, PWB to RLE  Performed at least two patient identifiers during session including name and wristband  Prior to admission, Patient reporting independnet with ADLs/ IADLs, ambulates with RW indoors, SPC outdoors, lives with  in a 2 story house, 1st floor set up, with up to 4 steps to enter with landings in between, no railings  Patient works part time at [employer], drives and manges her affairs independently   Personal factors affecting patient at time of initial evaluation include: limited caregiver support, steps to enter, limited insight into deficits, decreased initiation and engagement, difficulty performing ADLs and difficulty performing IADLs  Upon evaluation, patient requires minimal  assist for UB ADLs, maximal assist for LB ADLs, transfers and functional ambulation in room and bathroom with moderate assist, with the use of Rolling Walker (with nursing staff only)  Occupational performance is affected by the following deficits: flat affect, decreased functional use of BUEs, limited active ROM, decreased muscle strength, degenerative arthritic joint changes, impaired gross motor coordination, dynamic sit/ stand balance deficit with poor standing tolerance time for self care and functional mobility, decreased activity tolerance, decreased safety awareness, increased pain, orthopedic restrictions and postural control and postural alignment deficit, requiring external assistance to complete transitional movements  Therapist completed  extensive additional review of medical records and additional review of physical, cognitive or psychosocial history, clinical examination identifying 5 or more performance deficits, clinical decision making of a high complexity , consistent with a high complexity level evaluation        OT Discharge Recommendation: Short Term Rehab

## 2018-08-03 NOTE — PHYSICIAN ADVISOR
Current patient class: Inpatient  The patient is currently on Hospital Day: 2 at [address]        The patient is  documented to require at least a 2nd midnight in the hospital  As such the patient is  expected to satisfy the 2 midnight benchmark and is therefore eligible for inpatient admission  After review of the relevant documentation, labs, vital signs and test results, the patient is appropriate for INPATIENT ADMISSION  Admission to the hospital as an inpatient is a complex decision making process which requires the practitioner to consider the patients presenting complaint, history and physical examination and all relevant testing  With this in mind, in this case, the patient was deemed appropriate for INPATIENT ADMISSION  After review of the documentation and testing available at the time of the admission I concur with this clinical determination of medical necessity  Rationale is as follows: The patient is a [de-identified] yrs Female who presented to the ED at 8/2/2018 11:12 AM FOR HER RIGHT ANKLE I AND D AND REMOVAL OF HARDWARE-she was taking oral antibiotics as outpatient-admission was recommended on July 30th for right  ankle wound infection and patient refused  She underwent ankle on AND and removal of hardware on August 2nd  and necrotic tissue was debrided-seen in consultation by Infectious Disease Service on August 3rd -in the setting of hardware infection she will likely require prolonged course of antibiotics-she is being treated with IV antibiotics  Has been evaluated by  OT and short-term rehab is recommended  Patient is appropriate for inpatient admission as she is on IV antibiotics for right ankle hardware infection        The patients vitals on arrival were ED Triage Vitals   Temperature Pulse Respirations Blood Pressure SpO2   08/02/18 1155 08/02/18 1155 08/02/18 1441 08/02/18 1155 08/02/18 1155   98 2 °F (36 8 °C) (!) 114 17 142/94 93 %      Temp Source Heart Rate Source Patient Position - Orthostatic VS BP Location FiO2 (%)   08/02/18 1155 08/02/18 1155 08/02/18 1626 08/02/18 1626 --   Oral Monitor Lying Left arm       Pain Score       08/02/18 1441       5           Past Medical History:   Diagnosis Date    Atrial fibrillation (HCC)     Disease of thyroid gland     Hyperlipidemia     Hypertension      Past Surgical History:   Procedure Laterality Date    ORIF TIBIA & FIBULA FRACTURES Right 3/12/2018    Procedure: OPEN REDUCTION W/ INTERNAL FIXATION (ORIF) ANKLE  Open reduction internal fixation lateral malleolus with possible fixation of medial malleolus;  Surgeon: Clare Lr MD;  [facility];  Service: Orthopedics    SD I&D SOFT TISSUE ABSCESS SUBFASCIAL Right 8/2/2018    Procedure: Right ankle incision and drainage;  Surgeon: Clare Lr MD;  [facility];  Service: Orthopedics    SD REMOVAL DEEP IMPLANT Right 8/2/2018    Procedure: REMOVAL HARDWARE ANKLE;  Surgeon: Clare Lr MD;  [facility];  Service: Orthopedics    TOTAL THYROIDECTOMY         The patient was admitted to the hospital at 23  on 8/2/18 for the following diagnosis:  Status post ORIF of fracture of ankle [Z96 7, Z87 81]    Consults have been placed to:   IP CONSULT TO INFECTIOUS DISEASES  IP CONSULT TO CASE MANAGEMENT    Vitals:    08/02/18 2300 08/03/18 0300 08/03/18 0700 08/03/18 1600   BP: 119/65 110/62 113/64 134/62   BP Location: Left arm Left arm Left arm    Pulse: 86 80 78 86   Resp: 18 18 18 20   Temp: 97 7 °F (36 5 °C) 98 7 °F (37 1 °C) 98 6 °F (37 °C) 97 9 °F (36 6 °C)   TempSrc: Oral Oral Oral Oral   SpO2: 92% 92% 93% 94%   Weight:       Height:           Most recent labs:    Recent Labs      08/03/18   0458  08/03/18   0936   K  4 3   --    NA  135*   --    CALCIUM  8 5   --    BUN  14   --    CREATININE  0 89   --    INR   --   1 76*   AST  16   --    ALT  15   --    ALKPHOS  60   --    BILITOT  0 30   --        Scheduled Meds:  Current Facility-Administered Medications:  acetaminophen 325 mg Oral Q6H PRN Lynn Gaona MD   amLODIPine 5 mg Oral Daily Lynn Gaona MD   aspirin 81 mg Oral Daily Lynn Gaona MD   atorvastatin 10 mg Oral Daily With Juju Toro MD   furosemide 20 mg Oral Daily Lynn Gaona MD   HYDROmorphone 2 mg Oral Q4H PRN Lynn Gaona MD   levothyroxine 88 mcg Oral Early Morning Lynn Gaona MD   lisinopril 20 mg Oral Daily Lynn Gaona MD   metoprolol succinate 100 mg Oral Daily Lynn Gaona MD   multivitamin-minerals 1 tablet Oral Daily Lynn Gaona MD   warfarin 2 5 mg Oral Daily (warfarin) Lynn Gaona MD     Continuous Infusions:   PRN Meds:   acetaminophen    HYDROmorphone    Surgical procedures (if appropriate):  Procedure(s):  Right ankle incision and drainage  REMOVAL HARDWARE ANKLE

## 2018-08-03 NOTE — OCCUPATIONAL THERAPY NOTE
Occupational Therapy Evaluation        Patient Name: Darrin Jeffers  QFISL'H Date: 8/3/2018     08/03/18 0945   Note Type   Note type Eval only   Restrictions/Precautions   Weight Bearing Precautions Per Order Yes   RLE Weight Bearing Per Order PWB  (per [employer] PA-C: PWB through the heel)   Braces or Orthoses Other (Comment)  (R ankle wrapped w/ ACE wrap)   Other Precautions WBS; Fall Risk;Pain  (SHELDON drain left ankle)   Pain Assessment   Pain Assessment 0-10   Pain Score 9   Pain Type Chronic pain   Pain Location Knee   Pain Orientation Right   Pain Descriptors Aching;Cramping   Pain Onset Ongoing   Clinical Progression Not changed   Home Living   Type of Home House  (one small step into the kitchen)   Home Layout Two level;Performs ADLs on one level; Able to live on main level with bedroom/bathroom;Stairs to enter without rails  (patient described up to 4 steps with landing no rails)   Bathroom Shower/Tub Walk-in shower   Bathroom Toilet Raised   Bathroom Equipment Grab bars in shower; Shower chair; Toilet raiser;Grab bars around toilet   Bathroom Accessibility Accessible;Accessible via walker   Home Equipment Cane;Walker; Other (Comment)  (SPC for when she works & up/down steps; RW at home)   Additional Comments pt admits to furniture cruising as well in her home; pt wants to get electric lift recliner   Prior Function   Level of [place] Independent with ADLs and functional mobility   Lives With Spouse  (pt reporting her  could not provide A to her)   Receives Help From Family  (sister lives in [place])   [address] in the last 6 months 1 to 4   Vocational Part time employment  (pt works 28 hrs/wk at [employer])   Comments patient drives   Lifestyle   Autonomy Patient reporting independnet with ADLs/ IADLs, ambulates with RW indoors, SPC outdoors, lives with  in a 2 story house, 1st floor set up, with up to 4 steps to enter with landings in between, no railings  Patient works part time at [employer], drives and manges her affairs independently  Reciprocal Relationships Supportive sister   Service to Others part time employment   Psychosocial   Psychosocial (WDL) X   Patient Behaviors/Mood Sad;Tearful   Ability to Express Feelings Able to express   Ability to Express Needs Able to express   Ability to Express Thoughts Able to express   Ability to Understand Others Understands   ADL   Eating Assistance 6  Modified independent   Grooming Assistance 5  Supervision/Setup   UB Bathing Assistance 4  Minimal Assistance   LB Bathing Assistance 2  Maximal Assistance   UB Dressing Assistance 3  Moderate Assistance   LB Dressing Assistance 2  Maximal [address]  2  Maximal Assistance   Functional Assistance 3  Moderate Assistance   Bed Mobility   Rolling R Unable to assess  (received OOB to recliner)   Additional Comments Per nsg staff, pt's VENECIA Waters- pt required min/mod A x 1 w/ use of RW for bed mobility and functional transfers (STS and SPT) onto commode w/ nsg staff earlier this date  PT unable to mobilize pt at this time 2/2 refusal as pt is having increased pain in R knee currently   Transfers   Sit to Stand 3  Moderate assistance   Additional items Assist x 1  (per Nursing staff)   Stand to Sit 3  Moderate assistance   Additional items Assist x 1  (per nursing staff)   Balance   Static Sitting Fair +   Dynamic Sitting Fair   Activity Tolerance   Activity Tolerance Patient limited by pain; Patient limited by fatigue   Nurse Made Aware Yes, VENECIA Waters verbalized pt appropriate for OT/ PT session, made aware of outcomes/recs     RUE Assessment   RUE Assessment X   RUE Overall AROM   R Shoulder Flexion 100 with c/o of "soreness"   RUE Strength   RUE Overall Strength Deficits  (3+/5)   LUE Assessment   LUE Assessment X   LUE Overall AROM   L Shoulder Flexion 90 degrees   LUE Strength   LUE Overall Strength Deficits  (3+/5)   Edema   LUE Edema None   Hand Function   Gross Motor Coordination Impaired   Fine Motor Coordination Functional   Sensation   Light Touch No apparent deficits  (BUEs)   Vision-Basic Assessment   Current Vision Wears glasses all the time   Patient Visual Report Other (Comment)  (No significant changes reported)   Perception   Inattention/Neglect Appears intact   Cognition   Overall Cognitive Status WFL   Arousal/Participation Alert; Responsive; Cooperative   Attention Within functional limits   Orientation Level Oriented X4   Memory Within functional limits   Following Commands Follows all commands and directions without difficulty   Assessment Patient is a [de-identified] y o  female seen for OT evaluation s/p admit to [address] [place] [address] on 8/2/2018 w/Status post ORIF of fracture of ankle  Pt presented to ED w/ persistent drainage from surgical wound s/p ankle repair w/ placement of hardware performed in March 2018  Pt underwent hardware removal and I&D of R ankle wound 8/2/18 with orthopedic surgeron  Commorbidities affecting patient's functional performance at time of assessment include: AFib, hyperlipidemia, HTN, hypothyroidism, CKD stage3  Orders placed for OT evaluation and treatment, PWB to RLE  Performed at least two patient identifiers during session including name and wristband  Prior to admission, Patient reporting independnet with ADLs/ IADLs, ambulates with RW indoors, SPC outdoors, lives with  in a 2 story house, 1st floor set up, with up to 4 steps to enter with landings in between, no railings  Patient works part time at [employer], drives and [employer] her affairs independently  Personal factors affecting patient at time of initial evaluation include: limited caregiver support, steps to enter, limited insight into deficits, decreased initiation and engagement, difficulty performing ADLs and difficulty performing IADLs   Upon evaluation, patient requires minimal  assist for UB ADLs, maximal assist for LB ADLs, transfers and functional ambulation in room and bathroom with moderate assist, with the use of Rolling Walker (with nursing staff only)  Occupational performance is affected by the following deficits: flat affect, decreased functional use of BUEs, limited active ROM, decreased muscle strength, degenerative arthritic joint changes, impaired gross motor coordination, dynamic sit/ stand balance deficit with poor standing tolerance time for self care and functional mobility, decreased activity tolerance, decreased safety awareness, increased pain, orthopedic restrictions and postural control and postural alignment deficit, requiring external assistance to complete transitional movements  Therapist completed  extensive additional review of medical records and additional review of physical, cognitive or psychosocial history, clinical examination identifying 5 or more performance deficits, clinical decision making of a high complexity , consistent with a high complexity level evaluation  Patient to benefit from continued Occupational Therapy treatment while in the hospital to address deficits as defined above and maximize level of functional independence with ADLs and functional mobility  Occupational Performance areas to address include: bathing/ shower, dressing, toilet hygiene, transfer to all surfaces, functional ambulation, community mobility, functional communication, health maintenance, IADLS: Household maintenance, IADLs: safety procedures, IADLs: meal prep/ clean up, Leisure Participation and Social participation  From OT standpoint, recommendation at time of d/c would be Short Term Rehab  Limitation Decreased ADL status; Decreased UE ROM; Decreased UE strength;Decreased Safe judgement during ADL;Decreased endurance;Decreased self-care trans;Decreased high-level ADLs   Prognosis Good   Goals   Patient Goals "to return home"   Plan   Treatment Interventions ADL retraining;Functional transfer training;UE strengthening/ROM; Endurance training;Patient/family training;Equipment evaluation/education; Compensatory technique education;Continued evaluation;Cardiac education; Energy conservation; Activityengagement   Goal Expiration Date 08/10/18   OT Frequency 3-5x/wk   Recommendation   OT Discharge Recommendation Short Term Rehab   Equipment Recommended Bedside commode   Barthel Index   Feeding 10   Bathing 0   Grooming Score 0   Dressing Score 5   Bladder Score 10   Bowels Score 10   Toilet Use Score 5   Transfers (Bed/Chair) Score 5   Mobility (Level Surface) Score 0   Stairs Score 0   Barthel Index Score 45   Modified Eli Scale   Modified Kerr Scale 4     Occupational therapy Goals: In 7-10 days    Patient will verbalize/ demonstrate back safety precautions during functional activity  with No verbal cues    Patient will demonstrate correct of  use of long handle equipment to complete LB ADLs @ Mod I  level  Patient will restore  independence in bed mobility using Log Rolling technique to transfer OOB at Mod I  level  Patient will verbalize 3  safety awareness/ body mechanics principles to  prevent falls in the home setting  Patient will complete UB/LB ADLs @ Mod I level  Patient will complete light meal prep @ Mod I level  Patient will complete transfers to all surfaces @ Mod I level with AD as indicated

## 2018-08-03 NOTE — PLAN OF CARE
Problem: DISCHARGE PLANNING - CARE MANAGEMENT  Goal: Discharge to post-acute care or home with appropriate resources  INTERVENTIONS:  - Conduct assessment to determine patient/family and health care team treatment goals, and need for post-acute services based on payer coverage, community resources, and patient preferences, and barriers to discharge  - Address psychosocial, clinical, and financial barriers to discharge as identified in assessment in conjunction with the patient/family and health care team  - Arrange appropriate level of post-acute services according to patients   needs and preference and payer coverage in collaboration with the physician and health care team  - Communicate with and update the patient/family, physician, and health care team regarding progress on the discharge plan  - Arrange appropriate transportation to post-acute venues  Outcome: Progressing  LOS: 1 CM met with pt at bedside  Pt lives in [place] with her   Pt reports it is a 2 story that has 3 bhanu  Pt uses cane and walker prn  Pt completes ADL's independently  Pt has hx of rehab at [facility] and [facility]  Pt has hx with [facility]  Pt uses [facility] and [facility] for emergent scripts  Pt denies hx of MH and SA  Pt does not have POA/AD and declined information  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  Pt drives  CM discussed dcp and pt refuses rehab and [facility]  Pt explained she fell back in March and returned to have infected hardware removed  Pt stated, "I'm sorry for being like this but I'm going home"  Pt further explained she was away from home two months after fall in March and she is not willing [facility] rehab, [facility] or go to [facility] PT  Pt feels she can rehab herself and plans to return to work       CM reviewed discharge planning process including the following: identifying help at home, patient preference for discharge planning needs, pharmacy preference, and availability of treatment team to discuss questions or concerns patient and/or family may have regarding understanding medications and recognizing signs and symptoms once discharged  CM also encouraged patient to follow up with all recommended appointments after discharge  Patient advised of importance for patient and family to participate in managing patients medical well being  CM name and role reviewed and Discharge Checklist provided  CM provided warm hand off to Reilly Lloyd, [address]  Pt has no needs at this time  CM department to follow pt through dc

## 2018-08-04 LAB
BACTERIA SPEC ANAEROBE CULT: NORMAL
INR PPP: 2.1 (ref 0.86–1.17)
PROTHROMBIN TIME: 23.3 SECONDS (ref 11.8–14.2)

## 2018-08-04 PROCEDURE — 99024 POSTOP FOLLOW-UP VISIT: CPT | Performed by: PHYSICIAN ASSISTANT

## 2018-08-04 PROCEDURE — 97530 THERAPEUTIC ACTIVITIES: CPT

## 2018-08-04 PROCEDURE — 85610 PROTHROMBIN TIME: CPT | Performed by: PHYSICIAN ASSISTANT

## 2018-08-04 RX ADMIN — METOPROLOL SUCCINATE 100 MG: 100 TABLET, EXTENDED RELEASE ORAL at 08:46

## 2018-08-04 RX ADMIN — AMLODIPINE BESYLATE 5 MG: 5 TABLET ORAL at 08:46

## 2018-08-04 RX ADMIN — LEVOTHYROXINE SODIUM 88 MCG: 88 TABLET ORAL at 05:33

## 2018-08-04 RX ADMIN — WARFARIN SODIUM 2.5 MG: 2.5 TABLET ORAL at 18:38

## 2018-08-04 RX ADMIN — Medication 1 TABLET: at 15:46

## 2018-08-04 RX ADMIN — ATORVASTATIN CALCIUM 10 MG: 10 TABLET, FILM COATED ORAL at 15:46

## 2018-08-04 RX ADMIN — ASPIRIN 81 MG 81 MG: 81 TABLET ORAL at 08:46

## 2018-08-04 RX ADMIN — FUROSEMIDE 20 MG: 20 TABLET ORAL at 08:46

## 2018-08-04 RX ADMIN — HYDROMORPHONE HYDROCHLORIDE 2 MG: 2 TABLET ORAL at 05:34

## 2018-08-04 RX ADMIN — LISINOPRIL 20 MG: 20 TABLET ORAL at 08:46

## 2018-08-04 RX ADMIN — HYDROMORPHONE HYDROCHLORIDE 2 MG: 2 TABLET ORAL at 22:10

## 2018-08-04 NOTE — PROGRESS NOTES
Orthopedics   Angela Merritt [de-identified] y o  female MRN: [id number]  Unit/Bed#: -01    Subjective:  [de-identified] y  o female post operative day 2 status post right Ankle I&D and removal of hardware  Pt doing well  Pain controlled  Patient denies any numbness or tingling  Patient denies any constitutional signs or symptoms  She states it is difficult for her to partial weight bear through the right heel but she has been making transfers out of bed and getting to the bathroom      Labs:    0  Lab Value Date/Time   HCT 41 3 07/30/2018 1719   HCT 33 1 (L) 03/26/2018 0501   HCT 28 7 (L) 03/19/2018 0618   HGB 13 6 07/30/2018 1719   HGB 10 3 (L) 03/26/2018 0501   HGB 9 1 (L) 03/19/2018 0618   INR 2 10 (H) 08/04/2018 0537   WBC 11 27 (H) 07/30/2018 1719   WBC 7 62 03/26/2018 0501   WBC 8 25 03/19/2018 0618   ESR 21 (H) 07/30/2018 1719   CRP >90 0 (H) 07/30/2018 1719       Meds:    Current Facility-Administered Medications:     acetaminophen (TYLENOL) tablet 325 mg, 325 mg, Oral, Q6H PRN, Shanice Sal MD    amLODIPine (NORVASC) tablet 5 mg, 5 mg, Oral, Daily, Shanice Sal MD, 5 mg at 08/04/18 0846    aspirin chewable tablet 81 mg, 81 mg, Oral, Daily, Shanice Sal MD, 81 mg at 08/04/18 0846    atorvastatin (LIPITOR) tablet 10 mg, 10 mg, Oral, Daily With Lexi Schneider MD, 10 mg at 08/03/18 1727    furosemide (LASIX) tablet 20 mg, 20 mg, Oral, Daily, Shanice Sal MD, 20 mg at 08/04/18 0846    HYDROmorphone (DILAUDID) tablet 2 mg, 2 mg, Oral, Q4H PRN, Shanice Sal MD, 2 mg at 08/04/18 0534    levothyroxine tablet 88 mcg, 88 mcg, Oral, Early Morning, Shanice Sal MD, 88 mcg at 08/04/18 0533    lisinopril (ZESTRIL) tablet 20 mg, 20 mg, Oral, Daily, Kayden Caputo MD, 20 mg at 08/04/18 0846    metoprolol succinate (TOPROL-XL) 24 hr tablet 100 mg, 100 mg, Oral, Daily, Kayden Caputo MD, 100 mg at 08/04/18 0846    multivitamin-minerals (CENTRUM) tablet 1 tablet, 1 tablet, Oral, Daily, Christy MONTENEGRO MD Pat[place], 1 tablet at 08/03/18 1352    warfarin (COUMADIN) tablet 2 5 mg, 2 5 mg, Oral, Daily (warfarin), Yoon Allen MD, 2 5 mg at 08/03/18 1727    Blood Culture:   No results found for: BLOODCX    Wound Culture:   No results found for: WOUNDCULT    Ins and Outs:  I/O last 24 hours: In: -   Out: 2098 [Urine:2500; Drains:20]          Physical:  Vitals:    08/04/18 0700   BP: (!) 177/89   Pulse: 79   Resp: 18   Temp: 98 6 °F (37 °C)   SpO2: 93%     right lower extremity I&D removal of hardware  · Incision is clean dry and intact  Drain output was 20 cc last shift  · 4/5 strength to EHL/FHL  · Sensation intact L1-S1  · +2 DP Pulse    _*_*_*_*_*_*_*_*_*_*_*_*_*_*_*_*_*_*_*_*_*_*_*_*_*_*_*_*_*_*_*_*_*_*_*_*_*_*_*_*_*    Assessment:   Post operative day 2 status post right Ankle I&D, removal of hardware Doing well  Plan:  · Partial weight-bearing through the heel for the right lower extremity  · Up out of bed  · Ice, Elevation to affected extremity  · Analgesics  · DVT prophylaxis  · Dispo: Ortho will follow  · Will continue to assess for acute blood loss anemia   · Appreciated infectious disease input  Likely require 6 weeks antibiotics  Preliminary cultures show +1 Gram-positive cocci in pairs  Will continue dressing changes daily and monitor output of drain  Zoila Patterson PA-C

## 2018-08-04 NOTE — PLAN OF CARE
Problem: PHYSICAL THERAPY ADULT  Goal: Performs mobility at highest level of function for planned discharge setting  See evaluation for individualized goals  Treatment/Interventions: Functional transfer training, LE strengthening/ROM, Elevations, Therapeutic exercise, Endurance training, Patient/family training, Equipment eval/education, Bed mobility, Gait training, Continued evaluation, Spoke to nursing, OT  Equipment Recommended: Nelli Silva (at minimum currently; pt may benefit from sliding board)       See flowsheet documentation for full assessment, interventions and recommendations  Outcome: Progressing  Prognosis: Fair  Problem List: Decreased strength, Decreased range of motion, Decreased endurance, Impaired balance, Decreased mobility, Orthopedic restrictions, Pain, Decreased skin integrity  Assessment: Pt POD 2 status post right Ankle I&D and removal of hardware, PWB RLE  Pt agreeable to PT treatment session upon arrival, pt found seated OOB in recliner, in no apparent distress, A&O x 4 and responsive  Pt declining participation of ther ex, agreeable to sit<>stand training and standing trial x2 min duration  Note in static stance, pt performing NWB R LE  Educated pt on PWB and lateral weight shifting via UE support  Pt declining SPT attempt and ambulation trial at this time  Visual demonstration by therapist with verbal review of sit<>stand sequencing and technique, gait sequencing with RW, stair navigation with technique of ascent/descent  Verbal review of seated ther ex, pt declining participation of ther ex at this time, but able to verbalize understanding of MARYCRUZ saini, hip abd/add  Educated pt to perform to tolerance in pain free range, pt verbalizing understanding  Upon arrival- pt had reported her R knee pain is improving since yesterday- she reports MD told her most likely related to "positioning" and "Immobility"   Education provided to pt on importance of maintaining level of mobility and side effects of immobility pt verbalized understanding  Post session: all needs in reach and RN notified of session findings/recommendations  Continue to recommend STR at time of d/c in order to maximize pt's functional independence and safety w/ mobility  Pt continues to be functioning below baseline level, and remains limited 2* factors listed above and including at risk for falls  PT will continue to see pt while here in order to address the deficits listed above and provide interventions consistent w/ POC in effort to achieve STGs  Recommend trialing gait trials next session to pt's tolerance  Barriers to Discharge: Decreased caregiver support, Inaccessible home environment (pt reporting  will not provide A)     Recommendation: Short-term skilled PT     PT - OK to Discharge: Yes (if to STR when medically cleared)    See flowsheet documentation for full assessment

## 2018-08-04 NOTE — PHYSICAL THERAPY NOTE
Physical Therapy Treatment Note       08/04/18 6006   Pain Assessment   Pain Assessment No/denies pain  (pt denying pain pre and post session)   Pain Score No Pain   Hospital Pain Intervention(s) Cold applied;Repositioned;Elevated  (ice pack wrapped in towel- RN notified)   Restrictions/Precautions   Weight Bearing Precautions Per Order Yes   RLE Weight Bearing Per Order PWB  (PWB through heel per ortho note)   Braces or Orthoses (R ankle wrapped w/ ACE wrap)   Other Precautions WBS; Fall Risk;Pain  (SHELDON drain R ankle)   General   Chart Reviewed Yes   Response to Previous Treatment Patient with no complaints from previous session  Family/Caregiver Present No   Cognition   Overall Cognitive Status WFL   Arousal/Participation Alert; Responsive; Cooperative   Attention Within functional limits   Orientation Level Oriented X4   Memory Within functional limits   Following Commands Follows all commands and directions without difficulty   Comments pt agreeable to PT session   Subjective   Subjective "I am only going home"   Bed Mobility   Additional Comments pt received seated OOB in recliner upon arrival  NSG staff reporting performing SPT onto [facility] A of 1 with RW support  Transfers   Sit to Stand 4  Minimal assistance  (CGA)   Additional items Assist x 1; Armrests; Increased time required;Verbal cues  (SBA of 2nd for safety)   Stand to Sit 4  Minimal assistance  (CGA)   Additional items Assist x 1; Armrests; Increased time required;Verbal cues   Stand pivot (pt declining attempt at this time)   Additional Comments Pre mobility: BP taken 102/55mmHg, HR 93bpm  Note in static stance, pt performing NWB R LE  Educated pt on PWB and lateral weight shifting via UE support  Pt declining SPT attempt and ambulation trial at this time  Visual demonstration by therapist with verbal review of sit<>stand sequencing and technique, gait sequencing with RW, stair navigation with technique of ascent/descent   Verbal review of seated ther ex, pt declining participation of ther ex at this time, but able to verbalize understanding of yeny, LAQ, hip abd/add  Educated pt to perform to tolerance in pain free range, pt verbalizing understanding  Upon arrival- pt had reported her R knee pain is improving since yesterday- she reports MD told her most likely related to "positioning" and "Immobility"  Education provided to pt on importance of maintaining level of mobility and side effects of immobility pt verbalized understanding  Ambulation/Elevation   Gait pattern Not tested  (pt refusing trial)   Balance   Static Sitting Fair +   Dynamic Sitting Fair   Static Standing Fair  (with NWB R LE, RW support and weight shifted towards L)   Dynamic Standing (DNT, pt declining)   Ambulatory (DNT, pt declining)   Endurance Deficit   Endurance Deficit Yes   Activity Tolerance   Activity Tolerance Patient limited by pain; Patient limited by fatigue   Nurse Made Aware Yes, RN Radha Cameron verbalized pt appropriate for PT session, made aware of outcomes/recs   Assessment   Prognosis Fair   Problem List Decreased strength;Decreased range of motion;Decreased endurance; Impaired balance;Decreased mobility;Orthopedic restrictions;Pain;Decreased skin integrity   Assessment Pt POD 2 status post right Ankle I&D and removal of hardware, PWB RLE  Pt agreeable to PT treatment session upon arrival, pt found seated OOB in recliner, in no apparent distress, A&O x 4 and responsive  Pt declining participation of ther ex, agreeable to sit<>stand training and standing trial x2 min duration  Note in static stance, pt performing NWB R LE  Educated pt on PWB and lateral weight shifting via UE support  Pt declining SPT attempt and ambulation trial at this time  Visual demonstration by therapist with verbal review of sit<>stand sequencing and technique, gait sequencing with RW, stair navigation with technique of ascent/descent   Verbal review of seated ther ex, pt declining participation of ther ex at this time, but able to verbalize understanding of yeny, LAQ, hip abd/add  Educated pt to perform to tolerance in pain free range, pt verbalizing understanding  Upon arrival- pt had reported her R knee pain is improving since yesterday- she reports MD told her most likely related to "positioning" and "Immobility"  Education provided to pt on importance of maintaining level of mobility and side effects of immobility pt verbalized understanding  Post session: all needs in reach and RN notified of session findings/recommendations  Continue to recommend STR at time of d/c in order to maximize pt's functional independence and safety w/ mobility  Pt continues to be functioning below baseline level, and remains limited 2* factors listed above and including at risk for falls  PT will continue to see pt while here in order to address the deficits listed above and provide interventions consistent w/ POC in effort to achieve STGs  Recommend trialing gait trials next session to pt's tolerance  Barriers to Discharge Decreased caregiver support; Inaccessible home environment  (pt reporting  will not provide A)   Goals   Patient Goals to return home, pt refusing STR   STG Expiration Date 08/13/18   Short Term Goal #1 STGs remain appropriate   Treatment Day 1   Plan   Treatment/Interventions Functional transfer training;LE strengthening/ROM; Elevations; Therapeutic exercise; Endurance training;Patient/family training;Equipment eval/education; Bed mobility;Gait training;Continued evaluation;Spoke to nursing;OT   Progress Slow progress, decreased activity tolerance   PT Frequency 5x/wk; Weekend   Recommendation   Recommendation Short-term skilled PT   Equipment Recommended (TBD)   PT - OK to Discharge Yes  (if to STR when medically cleared)       Alida Miller, PT, DPT    Time of PT treatment session: [id number]

## 2018-08-05 LAB
BACTERIA SPEC ANAEROBE CULT: NO GROWTH
BACTERIA TISS AEROBE CULT: ABNORMAL
BACTERIA TISS AEROBE CULT: NO GROWTH
GRAM STN SPEC: ABNORMAL
GRAM STN SPEC: NORMAL
GRAM STN SPEC: NORMAL
INR PPP: 2.51 (ref 0.86–1.17)
PROTHROMBIN TIME: 26.8 SECONDS (ref 11.8–14.2)

## 2018-08-05 PROCEDURE — 99024 POSTOP FOLLOW-UP VISIT: CPT | Performed by: PHYSICIAN ASSISTANT

## 2018-08-05 PROCEDURE — 85610 PROTHROMBIN TIME: CPT | Performed by: PHYSICIAN ASSISTANT

## 2018-08-05 PROCEDURE — 97110 THERAPEUTIC EXERCISES: CPT

## 2018-08-05 PROCEDURE — 97530 THERAPEUTIC ACTIVITIES: CPT

## 2018-08-05 RX ADMIN — ASPIRIN 81 MG 81 MG: 81 TABLET ORAL at 09:36

## 2018-08-05 RX ADMIN — LEVOTHYROXINE SODIUM 88 MCG: 88 TABLET ORAL at 05:21

## 2018-08-05 RX ADMIN — HYDROMORPHONE HYDROCHLORIDE 2 MG: 2 TABLET ORAL at 21:09

## 2018-08-05 RX ADMIN — ATORVASTATIN CALCIUM 10 MG: 10 TABLET, FILM COATED ORAL at 17:51

## 2018-08-05 RX ADMIN — WARFARIN SODIUM 2.5 MG: 2.5 TABLET ORAL at 17:49

## 2018-08-05 RX ADMIN — Medication 1 TABLET: at 12:37

## 2018-08-05 NOTE — PLAN OF CARE
Problem: PHYSICAL THERAPY ADULT  Goal: Performs mobility at highest level of function for planned discharge setting  See evaluation for individualized goals  Treatment/Interventions: Functional transfer training, LE strengthening/ROM, Elevations, Therapeutic exercise, Endurance training, Patient/family training, Equipment eval/education, Bed mobility, Gait training, Continued evaluation, Spoke to nursing, OT  Equipment Recommended: Geetha Curtis (at minimum currently; pt may benefit from sliding board)       See flowsheet documentation for full assessment, interventions and recommendations  Outcome: Progressing  Prognosis: Fair  Problem List: Decreased strength, Decreased range of motion, Decreased endurance, Impaired balance, Decreased mobility, Orthopedic restrictions, Pain, Decreased skin integrity  Assessment: Pt POD 3 status post right Ankle I&D and removal of hardware, PWB RLE  Samantha Woods clarified WB via tiger text at 13:28  Noted pt to maintain PWB to heel R LE with surgical shoe  Pt agreeable to PT treatment session upon arrival, pt found seated OOB in recliner, in no apparent distress, A&O x 4 and responsive  Pt agreeable to sit<>stand training and standing trial x1 min duration  Note in static stance, pt performing NWB R LE  Educated pt on PWB and lateral weight shifting via UE support  Pt declining SPT attempt and ambulation trial at this time  Pt refusing ambulation trial until drain removed  Visual demonstration by therapist with verbal review of sit<>stand sequencing and technique, gait sequencing with RW- pt verbalizing understanding  Pt able to tolerate performance of seated marches, hip abd/add, LAQ x5 reps in pain free range  Educated pt to perform to tolerance in pain free range outside of therapy session, pt verbalizing understanding  Upon arrival- pt had reported her R knee pain is continuing to improve since yesterday  Encouraged pt to have spouse bring in L wrist splint for comfort per pt  Education provided to pt on importance of maintaining level of mobility and side effects of immobility pt verbalized understanding  Post session: all needs in reach and RN notified of session findings/recommendations  Continue to recommend STR at time of d/c in order to maximize pt's functional independence and safety w/ mobility  Pt continues to be functioning below baseline level, and remains limited 2* factors listed above and including at risk for falls  PT will continue to see pt while here in order to address the deficits listed above and provide interventions consistent w/ POC in effort to achieve STGs  Recommend trialing gait trials next session to pt's tolerance  Barriers to Discharge: Inaccessible home environment, Decreased caregiver support     Recommendation: Short-term skilled PT     PT - OK to Discharge: Yes (if to STR when medically cleared)    See flowsheet documentation for full assessment

## 2018-08-05 NOTE — PHYSICAL THERAPY NOTE
Physical Therapy Cancellation Note    Chart review performed  Pt agreeable to session upon arrival, pt interested in trialing [address] to R LE (order is for PWB R LE only to heel) and gait this date, as pt has only been able to perform static standing with RW while maintaining NWB to R LE previous session  At this time, PT treatment session deferred until [address] is further clarified by ortho  Tiger text sent to SOFIA Copeland at 13:01 to clarify if pt is ok to perform PWB in ace wrap or if ? surgical shoe is recommended  Awaiting response back from ortho for clarification  Discussed with VENECIA Graham  Also discussed with pt beside  PT will follow and provide PT interventions as appropriate      Anu Burks, PT, DPT

## 2018-08-05 NOTE — PLAN OF CARE
DISCHARGE PLANNING     Discharge to home or other facility with appropriate resources Not Progressing        DISCHARGE PLANNING - CARE MANAGEMENT     Discharge to post-acute care or home with appropriate resources Not Progressing        INFECTION - ADULT     Absence or prevention of progression during hospitalization Not Progressing     Absence of fever/infection during neutropenic period Not Progressing        Knowledge Deficit     Patient/family/caregiver demonstrates understanding of disease process, treatment plan, medications, and discharge instructions Not Progressing        PAIN - ADULT     Verbalizes/displays adequate comfort level or baseline comfort level Not Progressing        Potential for Falls     Patient will remain free of falls Not Progressing        Prexisting or High Potential for Compromised Skin Integrity     Skin integrity is maintained or improved Not Progressing        SAFETY ADULT     Maintain or return to baseline ADL function Not Progressing     Maintain or return mobility status to optimal level Not Progressing     Patient will remain free of falls Not Progressing

## 2018-08-05 NOTE — PROGRESS NOTES
Orthopedics   [facility] Overlie [de-identified] y o  female MRN: [id number]  Unit/Bed#: -01    Subjective:  [de-identified] y  o female post operative day 3 status post right Ankle I&D and removal of hardware Pt doing well  Pain controlled  She has no complaints  She denies any numbness or tingling  She denies any constitutional signs or symptoms      Labs:    0  Lab Value Date/Time   HCT 41 3 07/30/2018 1719   HCT 33 1 (L) 03/26/2018 0501   HCT 28 7 (L) 03/19/2018 0618   HGB 13 6 07/30/2018 1719   HGB 10 3 (L) 03/26/2018 0501   HGB 9 1 (L) 03/19/2018 0618   INR 2 51 (H) 08/05/2018 0520   WBC 11 27 (H) 07/30/2018 1719   WBC 7 62 03/26/2018 0501   WBC 8 25 03/19/2018 0618   ESR 21 (H) 07/30/2018 1719   CRP >90 0 (H) 07/30/2018 1719       Meds:    Current Facility-Administered Medications:     acetaminophen (TYLENOL) tablet 325 mg, 325 mg, Oral, Q6H PRN, Sonya Lanier MD    amLODIPine (NORVASC) tablet 5 mg, 5 mg, Oral, Daily, Sonya Lanier MD, 5 mg at 08/04/18 0846    aspirin chewable tablet 81 mg, 81 mg, Oral, Daily, Sonya Lanier MD, 81 mg at 08/05/18 0936    atorvastatin (LIPITOR) tablet 10 mg, 10 mg, Oral, Daily With Vish Miles MD, 10 mg at 08/04/18 1546    furosemide (LASIX) tablet 20 mg, 20 mg, Oral, Daily, Sonya Lanier MD, 20 mg at 08/04/18 0846    HYDROmorphone (DILAUDID) tablet 2 mg, 2 mg, Oral, Q4H PRN, Sonya Lanier MD, 2 mg at 08/04/18 2210    levothyroxine tablet 88 mcg, 88 mcg, Oral, Early Morning, Sonya Lanier MD, 88 mcg at 08/05/18 0521    lisinopril (ZESTRIL) tablet 20 mg, 20 mg, Oral, Daily, Sonya Lanier MD, 20 mg at 08/04/18 0846    metoprolol succinate (TOPROL-XL) 24 hr tablet 100 mg, 100 mg, Oral, Daily, Sonya Lanier MD, 100 mg at 08/04/18 0846    multivitamin-minerals (CENTRUM) tablet 1 tablet, 1 tablet, Oral, Daily, Sonya Lanier MD, 1 tablet at 08/04/18 1546    warfarin (COUMADIN) tablet 2 5 mg, 2 5 mg, Oral, Daily (warfarin), Sonya Lanier MD, 2 5 mg at 08/04/18 1838    Blood Culture:   No results found for: BLOODCX    Wound Culture:   No results found for: WOUNDCULT    Ins and Outs:  I/O last 24 hours: In: 480 [P O :480]  Out: 2105 [Urine:2095; Drains:10]          Physical:  Vitals:    08/05/18 0936   BP: 98/63   Pulse: 102   Resp:    Temp:    SpO2:      right lower extremity I&D, and removal of hardware  · Dressings clean Dry Intact  · 4/5 strength to EHL/FHL  · Sensation intact L1-S1  · +2 DP Pulse  · 10 cc of drainage noted from left shift    _*_*_*_*_*_*_*_*_*_*_*_*_*_*_*_*_*_*_*_*_*_*_*_*_*_*_*_*_*_*_*_*_*_*_*_*_*_*_*_*_*    Assessment:   Post operative day 3 status post right Ankle I&D and removal of hardware Doing well  Plan:  · Partial weight-bearing through the heel of the right lower extremity  · Up out of bed  · Ice, Elevation to affected extremity  · Analgesics  · DVT prophylaxis  · Dispo: Ortho will follow  · Will continue to assess for acute blood loss anemia   · Final cultures and sensitivities returned with 3 plus growth of Staphylococcus aureus  Susceptibility to the cefazolin, gentamicin, oxacillin, tetracycline, Bactrim, vancomycin  Will await Infectious Disease antibiotic treatment regimen for either IV or oral antibiotic use  Stas Easley PA-C

## 2018-08-05 NOTE — PHYSICAL THERAPY NOTE
Physical Therapy Treatment Note       08/05/18 0987   Pain Assessment   Pain Assessment No/denies pain   Pain Score No Pain   Restrictions/Precautions   Weight Bearing Precautions Per Order Yes   RLE Weight Bearing Per Order PWB   Braces or Orthoses Other (Comment)  (surgical shoe per ortho)   Other Precautions WBS; Fall Risk;Pain;Multiple lines   General   Chart Reviewed Yes   Response to Previous Treatment Patient with no complaints from previous session  Family/Caregiver Present No   Cognition   Overall Cognitive Status WFL   Arousal/Participation Alert; Responsive; Cooperative   Attention Within functional limits   Orientation Level Oriented X4   Memory Within functional limits   Following Commands Follows all commands and directions without difficulty   Comments pt agreeable to PT session   Subjective   Subjective "I want to wait to put pressure on my foot until this drain is removed"   Bed Mobility   Additional Comments pt received seated OOB in recliner upon arrival  [facility] staff reporting pt performing SPT onto BSC A of 1 with RW support while maintaining NWB to RLE   Transfers   Sit to Stand 4  Minimal assistance   Additional items Assist x 1; Armrests; Increased time required;Verbal cues   Stand to Sit 4  Minimal assistance   Additional items Assist x 1; Armrests; Increased time required;Verbal cues   Additional Comments Note in static stance, pt performing NWB R LE  Educated pt on PWB and lateral weight shifting via UE support  Pt denying any pain to L wrist with transfers, notes chronic issue- as was cleared by MD for [facility] and normal use per pt, notes she wears splint at times for comfort, encouraged pt to have spouse bring in if needed  Pt declining SPT attempt and ambulation trial at this time   Visual demonstration by therapist with verbal review of sit<>stand sequencing and technique, gait sequencing with RW  Education provided to pt on importance of maintaining level of mobility and side effects of immobility pt verbalized understanding  Per chart review, pt has been cleared by Dr Lilliam Aldridge for [facility] to L UE  Ambulation/Elevation   Gait pattern Not tested  (pt refusing to trial at this time)   Balance   Static Sitting Fair +   Dynamic Sitting Fair   Static Standing Fair  (with NWB R LE, RW support and weight shifted towards L side)   Endurance Deficit   Endurance Deficit Yes   Activity Tolerance   Activity Tolerance Patient limited by fatigue   Medical Staff Made Aware Magdalene Comings clarified WB via tiger text at 13:28  Noted pt to maintain PWB to heel R LE with surgical shoe  Nurse Made Aware Yes, VENECIA Lawrence verbalized pt appropriate for PT session, made aware of outcomes/recs   Exercises   Hip Abduction Sitting;5 reps;AROM; Bilateral   Hip Adduction Sitting;5 reps;AROM; Bilateral   Knee AROM Long Arc Quad Sitting;5 reps;AROM; Bilateral   Marching Sitting;5 reps;AROM; Bilateral   Assessment   Prognosis Fair   Problem List Decreased strength;Decreased range of motion;Decreased endurance; Impaired balance;Decreased mobility;Orthopedic restrictions;Pain;Decreased skin integrity   Assessment Pt POD 3 status post right Ankle I&D and removal of hardware, PWB RLE  Magdalene Comings clarified WB via tiger text at 13:28  Noted pt to maintain PWB to heel R LE with surgical shoe  Pt agreeable to PT treatment session upon arrival, pt found seated OOB in recliner, in no apparent distress, A&O x 4 and responsive  Pt agreeable to sit<>stand training and standing trial x1 min duration  Note in static stance, pt performing NWB R LE  Educated pt on PWB and lateral weight shifting via UE support  Pt declining SPT attempt and ambulation trial at this time  Pt refusing ambulation trial until drain removed  Visual demonstration by therapist with verbal review of sit<>stand sequencing and technique, gait sequencing with RW- pt verbalizing understanding   Pt able to tolerate performance of seated marches, hip abd/add, LAQ x5 reps in pain free range  Educated pt to perform to tolerance in pain free range outside of therapy session, pt verbalizing understanding  Upon arrival- pt had reported her R knee pain is continuing to improve since yesterday  Encouraged pt to have spouse bring in L wrist splint for comfort per pt  Education provided to pt on importance of maintaining level of mobility and side effects of immobility pt verbalized understanding  Post session: all needs in reach and RN notified of session findings/recommendations  Continue to recommend STR at time of d/c in order to maximize pt's functional independence and safety w/ mobility  Pt continues to be functioning below baseline level, and remains limited 2* factors listed above and including at risk for falls  PT will continue to see pt while here in order to address the deficits listed above and provide interventions consistent w/ POC in effort to achieve STGs  Recommend trialing gait trials next session to pt's tolerance  Barriers to Discharge Inaccessible home environment;Decreased caregiver support   Goals   Patient Goals to have drain removed tomorrow   STG Expiration Date 08/13/18   Treatment Day 2   Plan   Treatment/Interventions Functional transfer training;LE strengthening/ROM; Elevations; Therapeutic exercise; Endurance training;Patient/family training;Equipment eval/education; Bed mobility;Gait training;Continued evaluation;Spoke to nursing;Spoke to advanced practitioner   Progress Slow progress, decreased activity tolerance   PT Frequency 7x/wk   Recommendation   Recommendation Short-term skilled PT   Equipment Recommended (TBD)   PT - OK to Discharge Yes  (if to STR when medically cleared)       Brianda Jacques, PT, DPT    Time of PT treatment session: [id number]

## 2018-08-06 LAB
ANION GAP SERPL CALCULATED.3IONS-SCNC: 4 MMOL/L (ref 4–13)
BASOPHILS # BLD AUTO: 0.06 THOUSANDS/ΜL (ref 0–0.1)
BASOPHILS NFR BLD AUTO: 1 % (ref 0–1)
BUN SERPL-MCNC: 15 MG/DL (ref 5–25)
CALCIUM SERPL-MCNC: 8.6 MG/DL (ref 8.3–10.1)
CHLORIDE SERPL-SCNC: 100 MMOL/L (ref 100–108)
CO2 SERPL-SCNC: 31 MMOL/L (ref 21–32)
CREAT SERPL-MCNC: 0.82 MG/DL (ref 0.6–1.3)
EOSINOPHIL # BLD AUTO: 0.21 THOUSAND/ΜL (ref 0–0.61)
EOSINOPHIL NFR BLD AUTO: 3 % (ref 0–6)
ERYTHROCYTE [DISTWIDTH] IN BLOOD BY AUTOMATED COUNT: 16.4 % (ref 11.6–15.1)
GFR SERPL CREATININE-BSD FRML MDRD: 68 ML/MIN/1.73SQ M
GLUCOSE SERPL-MCNC: 127 MG/DL (ref 65–140)
HCT VFR BLD AUTO: 38.4 % (ref 34.8–46.1)
HGB BLD-MCNC: 12.3 G/DL (ref 11.5–15.4)
IMM GRANULOCYTES # BLD AUTO: 0.03 THOUSAND/UL (ref 0–0.2)
IMM GRANULOCYTES NFR BLD AUTO: 1 % (ref 0–2)
INR PPP: 2.73 (ref 0.86–1.17)
LYMPHOCYTES # BLD AUTO: 0.88 THOUSANDS/ΜL (ref 0.6–4.47)
LYMPHOCYTES NFR BLD AUTO: 14 % (ref 14–44)
MCH RBC QN AUTO: 29.3 PG (ref 26.8–34.3)
MCHC RBC AUTO-ENTMCNC: 32 G/DL (ref 31.4–37.4)
MCV RBC AUTO: 91 FL (ref 82–98)
MONOCYTES # BLD AUTO: 0.74 THOUSAND/ΜL (ref 0.17–1.22)
MONOCYTES NFR BLD AUTO: 12 % (ref 4–12)
NEUTROPHILS # BLD AUTO: 4.17 THOUSANDS/ΜL (ref 1.85–7.62)
NEUTS SEG NFR BLD AUTO: 69 % (ref 43–75)
NRBC BLD AUTO-RTO: 0 /100 WBCS
PLATELET # BLD AUTO: 326 THOUSANDS/UL (ref 149–390)
PMV BLD AUTO: 9.7 FL (ref 8.9–12.7)
POTASSIUM SERPL-SCNC: 4.1 MMOL/L (ref 3.5–5.3)
PROTHROMBIN TIME: 28.6 SECONDS (ref 11.8–14.2)
RBC # BLD AUTO: 4.2 MILLION/UL (ref 3.81–5.12)
SODIUM SERPL-SCNC: 135 MMOL/L (ref 136–145)
WBC # BLD AUTO: 6.09 THOUSAND/UL (ref 4.31–10.16)

## 2018-08-06 PROCEDURE — 99024 POSTOP FOLLOW-UP VISIT: CPT | Performed by: PHYSICIAN ASSISTANT

## 2018-08-06 PROCEDURE — 97116 GAIT TRAINING THERAPY: CPT

## 2018-08-06 PROCEDURE — 80048 BASIC METABOLIC PNL TOTAL CA: CPT | Performed by: INTERNAL MEDICINE

## 2018-08-06 PROCEDURE — 85610 PROTHROMBIN TIME: CPT | Performed by: PHYSICIAN ASSISTANT

## 2018-08-06 PROCEDURE — 85025 COMPLETE CBC W/AUTO DIFF WBC: CPT | Performed by: INTERNAL MEDICINE

## 2018-08-06 PROCEDURE — 99232 SBSQ HOSP IP/OBS MODERATE 35: CPT | Performed by: INTERNAL MEDICINE

## 2018-08-06 RX ADMIN — LISINOPRIL 20 MG: 20 TABLET ORAL at 09:41

## 2018-08-06 RX ADMIN — AMLODIPINE BESYLATE 5 MG: 5 TABLET ORAL at 09:38

## 2018-08-06 RX ADMIN — LEVOTHYROXINE SODIUM 88 MCG: 88 TABLET ORAL at 05:32

## 2018-08-06 RX ADMIN — ASPIRIN 81 MG 81 MG: 81 TABLET ORAL at 09:39

## 2018-08-06 RX ADMIN — Medication 1 TABLET: at 13:50

## 2018-08-06 RX ADMIN — ATORVASTATIN CALCIUM 10 MG: 10 TABLET, FILM COATED ORAL at 18:11

## 2018-08-06 RX ADMIN — FUROSEMIDE 20 MG: 20 TABLET ORAL at 09:38

## 2018-08-06 RX ADMIN — CEFAZOLIN SODIUM 2000 MG: 2 SOLUTION INTRAVENOUS at 11:01

## 2018-08-06 RX ADMIN — WARFARIN SODIUM 2.5 MG: 2.5 TABLET ORAL at 18:11

## 2018-08-06 RX ADMIN — CEFAZOLIN SODIUM 2000 MG: 2 SOLUTION INTRAVENOUS at 18:12

## 2018-08-06 RX ADMIN — METOPROLOL SUCCINATE 100 MG: 100 TABLET, EXTENDED RELEASE ORAL at 09:38

## 2018-08-06 RX ADMIN — HYDROMORPHONE HYDROCHLORIDE 2 MG: 2 TABLET ORAL at 20:24

## 2018-08-06 NOTE — PROGRESS NOTES
Orthopedics   Salena Mckoy [de-identified] y o  female MRN: [id number]  Unit/Bed#: -01      Subjective:  [de-identified] y  o female post operative day 4 status post right Ankle I&D and removal of hardware Pt doing well  Denies any numbness or tingling  Patient states she has been of little on comfortable with the bulky dressing      Labs:    0  Lab Value Date/Time   HCT 38 4 08/06/2018 0944   HCT 41 3 07/30/2018 1719   HCT 33 1 (L) 03/26/2018 0501   HGB 12 3 08/06/2018 0944   HGB 13 6 07/30/2018 1719   HGB 10 3 (L) 03/26/2018 0501   INR 2 73 (H) 08/06/2018 0456   WBC 6 09 08/06/2018 0944   WBC 11 27 (H) 07/30/2018 1719   WBC 7 62 03/26/2018 0501   ESR 21 (H) 07/30/2018 1719   CRP >90 0 (H) 07/30/2018 1719       Meds:    Current Facility-Administered Medications:     acetaminophen (TYLENOL) tablet 325 mg, 325 mg, Oral, Q6H PRN, Michael Pike MD    amLODIPine (NORVASC) tablet 5 mg, 5 mg, Oral, Daily, Michael Pike MD, 5 mg at 08/06/18 4563    aspirin chewable tablet 81 mg, 81 mg, Oral, Daily, Michael Pike MD, 81 mg at 08/06/18 8210    atorvastatin (LIPITOR) tablet 10 mg, 10 mg, Oral, Daily With Cordell Maldonado MD, 10 mg at 08/05/18 1751    ceFAZolin (ANCEF) IVPB (premix) 2,000 mg, 2,000 mg, Intravenous, Q8H, Kristine Gerardo MD, Last Rate: 100 mL/hr at 08/06/18 1101, 2,000 mg at 08/06/18 1101    furosemide (LASIX) tablet 20 mg, 20 mg, Oral, Daily, Michael Pike MD, 20 mg at 08/06/18 1112    HYDROmorphone (DILAUDID) tablet 2 mg, 2 mg, Oral, Q4H PRN, Michael Pike MD, 2 mg at 08/05/18 2109    levothyroxine tablet 88 mcg, 88 mcg, Oral, Early Morning, Michael Pike MD, 88 mcg at 08/06/18 0532    lisinopril (ZESTRIL) tablet 20 mg, 20 mg, Oral, Daily, Michael Pike MD, 20 mg at 08/06/18 0941    metoprolol succinate (TOPROL-XL) 24 hr tablet 100 mg, 100 mg, Oral, Daily, Michael Pike MD, 100 mg at 08/06/18 0938    multivitamin-minerals (CENTRUM) tablet 1 tablet, 1 tablet, Oral, Daily, Michael Pike MD, 1 tablet at 08/05/18 1237    warfarin (COUMADIN) tablet 2 5 mg, 2 5 mg, Oral, Daily (warfarin), Yoon Allen MD, 2 5 mg at 08/05/18 1749    Blood Culture:   No results found for: BLOODCX    Wound Culture:   No results found for: WOUNDCULT    Ins and Outs:  I/O last 24 hours: In: 800 [P O :800]  Out: 1760 [Urine:1750; Drains:10]          Physical:  Vitals:    08/06/18 0700   BP: 119/70   Pulse: 100   Resp: 18   Temp: 98 7 °F (37 1 °C)   SpO2: 92%     right lower extremity:  Ankle  · Dressing is clean dry and intact  Dressing removed and wound also looks clean dry and intact with sutures  There is drain in place  Drain only with minimal drainage past 24 hours  · Sensation intact to light touch L1-S1  · Patient has full range of motion right knee and able to move all toes  Able to move ankle, limited ROm secondary to recent surgery as expected  · +2 DP Pulse    _*_*_*_*_*_*_*_*_*_*_*_*_*_*_*_*_*_*_*_*_*_*_*_*_*_*_*_*_*_*_*_*_*_*_*_*_*_*_*_*_*    Assessment:   Post operative day 4 status post right Ankle I&D and removal of hardware  Final cultures was positive for 3+ growth of Staphylococcus aureus  Infectious disease evaluation suspected osteomyelitis and recommends IV antibiotics for four weeks with IV cefazolin    Plan:  · Partial weight-bearing through the heel of the right lower extremity  · Up out of bed  · Analgesics as needed  · Will need PICC line for abx, order placed and consent given     · DVT prophylaxis  · Dispo: can d/c with PICC AND  IV abx and follow up with Dr Yolette Shaver 14 days      [facility] Center[facility]

## 2018-08-06 NOTE — CASE MANAGEMENT
Continued Stay Review    Date: 8/6    Vital Signs: /70 (BP Location: Left arm)   Pulse 100   Temp 98 7 °F (37 1 °C) (Oral)   Resp 18   Ht 5' 4" (1 626 m)   Wt 83 2 kg (183 lb 6 8 oz)   SpO2 92%   BMI 31 48 kg/m²     Medications:   Scheduled Meds:   Current Facility-Administered Medications:  acetaminophen 325 mg Oral Q6H PRN Stephen Barbosa MD    amLODIPine 5 mg Oral Daily Stephen Barbosa MD    aspirin 81 mg Oral Daily Stephen Barbosa MD    atorvastatin 10 mg Oral Daily With Pro Leon MD    cefazolin 2,000 mg Intravenous Q8H Shane Alvarado MD Last Rate: 2,000 mg (08/06/18 1101)   furosemide 20 mg Oral Daily Stephen Barbosa MD    HYDROmorphone 2 mg Oral Q4H PRN Stephen Barbosa MD    levothyroxine 88 mcg Oral Early Morning Stephen Barbosa MD    lisinopril 20 mg Oral Daily Stephen Barbosa MD    metoprolol succinate 100 mg Oral Daily Stephen Barbosa MD    multivitamin-minerals 1 tablet Oral Daily Stephen Barbosa MD    warfarin 2 5 mg Oral Daily (warfarin) Stephen Barbosa MD      Continuous Infusions:    PRN Meds:   acetaminophen    HYDROmorphone    Abnormal Labs/Diagnostic Results: na  135    Age/Sex: [de-identified] y o  female     Assessment/Plan: Assessment:   Post operative day 4 status post right Ankle I&D and removal of hardware  Final cultures was positive for 3+ growth of Staphylococcus aureus  Infectious disease evaluation suspected osteomyelitis and recommends IV antibiotics for four weeks with IV cefazolin   Plan:  · Partial weight-bearing through the heel of the right lower extremity  · Up out of bed  · Analgesics as needed  · Will need PICC line for abx, order placed and consent given  · DVT prophylaxis  · Dispo: can d/c with PICC AND  IV abx and follow up with Dr Jax Gomez 14 days  Discharge Plan: TBD     ID note  8/6   Impression/Recommendations:  1    Suspect right ankle osteomyelitis    Although no obvious osteomyelitis was seen at surgery/hardware extraction, probability of osteomyelitis is extremely high, due to infected hardware  Will treat patient with long-term IV antibiotic  Restart IV cefazolin  Treat x4 weeks total, through 9/3    2  Right ankle hardware infection  Patient is now status post hardware extraction  She still needs long-term IV antibiotic, as in above  However, with no further hardware, she will not need antibiotic suppression afterwards  Antibiotic plan as in above    3   History of right ankle fracture with ORIF in March 2018  Apparently, fracture has healed

## 2018-08-06 NOTE — PHYSICAL THERAPY NOTE
Physical Therapy Treatment Note       08/06/18 1245   Pain Assessment   Pain Assessment 0-10   Pain Score No Pain   Restrictions/Precautions   Weight Bearing Precautions Per Order Yes   RLE Weight Bearing Per Order PWB   Braces or Orthoses Other (Comment)  (surgical shoe per ortho)   Other Precautions WBS; Fall Risk;Pain;Multiple lines   General   Chart Reviewed Yes   Response to Previous Treatment Patient with no complaints from previous session  Family/Caregiver Present No   Cognition   Overall Cognitive Status WFL   Arousal/Participation Alert; Responsive; Cooperative   Attention Within functional limits   Orientation Level Oriented X4   Memory Within functional limits   Following Commands Follows all commands and directions without difficulty   Comments pt agreeable to PT session   Subjective   Subjective "I want to go home"   Bed Mobility   Rolling R Unable to assess  (pt received OOB in recliner)   Transfers   Sit to Stand 5  Supervision   Additional items Assist x 1; Armrests; Increased time required   Stand to Sit 5  Supervision   Additional items Assist x 1; Armrests; Increased time required   Additional Comments Educated pt on PWB and lateral weight shifting via UE support  Pt denying any pain to L wrist with transfers, notes chronic issue- as was cleared by MD for [facility] and normal use per pt, notes she wears splint at times for comfort  Surgical shoe was donned prior to OOB   Ambulation/Elevation   Gait pattern Antalgic;Decreased foot clearance;Decreased R stance; Foward flexed; Short stride   Gait Assistance 5  Supervision   Additional items Assist x 1;Verbal cues   Assistive Device Rolling walker   Distance 80'   Stair Management Assistance 5  Supervision   Additional items Assist x 1;Verbal cues; Tactile cues   Stair Management Technique With walker; Step to pattern; Foreward;Backward   Number of Stairs 1  (x2 trials)   Balance   Static Sitting Good   Dynamic Sitting Fair +   Static Standing Fair   Dynamic Standing Fair   Ambulatory Fair   Endurance Deficit   Endurance Deficit Yes   Activity Tolerance   Activity Tolerance Patient limited by fatigue   Nurse Made Aware Yes, RN Cathryn Villela verbalized pt appropriate for PT session, made aware of outcomes   Assessment   Prognosis Fair   Problem List Decreased strength;Decreased range of motion;Decreased endurance; Impaired balance;Decreased mobility;Orthopedic restrictions;Pain;Decreased skin integrity   Assessment Pt seen for PT treatment session this date with interventions consisting of gait training w/ emphasis on improving pt's ability to ambulate level surfaces x 80 ft with close S provided by therapist with RW, therapeutic activity consisting of training: sit<>stand transfers, static standing tolerance for 2 minutes w/ B UE support and vc and tactile cues for static standing posture faciliation and navigating 2 x 1 stairs w/ B handrail with step to pattern with close S  Pt agreeable to PT treatment session upon arrival, pt found seated OOB in recliner, in no apparent distress, A&O x 4 and responsive  In comparison to previous session, pt with improvements in tolerating greater amb distance, demonstrating safe PWB through R LE w/ all mobility at this time  Pt w/ continued fatigue w/ greater amb distances  Post session: pt returned back to recliner, all needs in reach and RN notified of session findings/recommendations  Continue to recommend STR vs  Home PT at time of d/c in order to maximize pt's functional independence and safety w/ mobility  Pt continues to be functioning below baseline level, and remains limited 2* factors listed above  PT will continue to see pt while here in order to address the deficits listed above and provide interventions consistent w/ POC in effort to achieve STGs     Barriers to Discharge Inaccessible home environment;Decreased caregiver support   Goals   Patient Goals "to go home"   STG Expiration Date 08/13/18   Short Term Goal #1 STGs remain appropriate   Treatment Day 3   Plan   Treatment/Interventions Functional transfer training;LE strengthening/ROM; Elevations; Therapeutic exercise; Endurance training;Patient/family training;Equipment eval/education; Bed mobility;Gait training;Continued evaluation;Spoke to nursing;Spoke to advanced practitioner   Progress Slow progress, decreased activity tolerance   PT Frequency 7x/wk   Recommendation   Recommendation Short-term skilled PT  (vs  HHPT, pending progress)   Equipment Recommended Deonte Vences PT    Time of PT treatment session: [id number]

## 2018-08-06 NOTE — PROGRESS NOTES
Progress Note - Infectious Disease   Makenzie Garcia [de-identified] y o  female MRN: [id number]  Unit/Bed#: -01 Encounter: [id number]      Impression/Recommendations:  1  Suspect right ankle osteomyelitis  Although no obvious osteomyelitis was seen at surgery/hardware extraction, probability of osteomyelitis is extremely high, due to infected hardware  Will treat patient with long-term IV antibiotic  Restart IV cefazolin  Treat x4 weeks total, through 9/3     2  Right ankle hardware infection  Patient is now status post hardware extraction  She still needs long-term IV antibiotic, as in above  However, with no further hardware, she will not need antibiotic suppression afterwards  Antibiotic plan as in above      3  History of right ankle fracture with ORIF in 2018  Apparently, fracture has healed         Antibiotics:  Off antibiotic     Subjective:  Patient only has mild pain in right ankle  However, she states she is unable to ambulate due to dressing  Temperature stays down  No chills  For some reason, patient cefazolin was discontinued over the weekend  Objective:  Vitals:  HR:  [] 100  Resp:  [18] 18  BP: (107-119)/(61-70) 119/70  SpO2:  [92 %-94 %] 92 %  Temp (24hrs), Av 3 °F (36 8 °C), Min:98 °F (36 7 °C), Max:98 7 °F (37 1 °C)  Current: Temperature: 98 7 °F (37 1 °C)    Physical Exam:     General: Awake, alert, cooperative, no distress  Neck:  Supple  No mass  No lymphadenopathy  Lungs: Expansion symmetric, no rales, no wheezing, respirations unlabored  Heart:  Regular rate and rhythm, S1 and S2 normal, no murmur  Abdomen: Soft, nondistended, non-tender, bowel sounds active all four quadrants,        no masses, no organomegaly  Extremities: Right ankle with dressing in place  Dressing is dry  No erythema beyond dressing  Mild tenderness  Drain serosanguinous  Skin:  No rash  Neuro: Moves all extremities       Invasive Devices     Peripheral Intravenous Line Peripheral IV 08/02/18 Right Forearm 3 days          Drain            Closed/Suction Drain Right Other (Comment) Bulb 10 Fr  3 days                Labs studies:   I have personally reviewed pertinent labs  Results from last 7 days  Lab Units 08/06/18  0944 08/03/18  0458 07/30/18  1719   SODIUM mmol/L 135* 135* 134*   POTASSIUM mmol/L 4 1 4 3 3 7   CHLORIDE mmol/L 100 102 98*   CO2 mmol/L 31 29 31   ANION GAP mmol/L 4 4 5   BUN mg/dL 15 14 16   CREATININE mg/dL 0 82 0 89 1 13   EGFR ml/min/1 73sq m 68 61 46   GLUCOSE RANDOM mg/dL 127 93 115   CALCIUM mg/dL 8 6 8 5 9 1   AST U/L  --  16 20   ALT U/L  --  15 10*   ALK PHOS U/L  --  60 85   TOTAL PROTEIN g/dL  --  6 0* 8 0   BILIRUBIN TOTAL mg/dL  --  0 30 0 80       Results from last 7 days  Lab Units 08/06/18  0944 07/30/18  1719   WBC Thousand/uL 6 09 11 27*   HEMOGLOBIN g/dL 12 3 13 6   PLATELETS Thousands/uL 326 234       Results from last 7 days  Lab Units 08/02/18  1350 08/02/18  1347 08/02/18  1341 08/02/18  1334   GRAM STAIN RESULT  Rare Polys  1+ Gram positive cocci in pairs Rare Polys  No bacteria seen No Polys or Bacteria seen No Polys or Bacteria seen       Imaging Studies:   I have personally reviewed pertinent imaging study reports and images in PACS  EKG, Pathology, and Other Studies:   I have personally reviewed pertinent reports

## 2018-08-06 NOTE — PLAN OF CARE
Problem: PHYSICAL THERAPY ADULT  Goal: Performs mobility at highest level of function for planned discharge setting  See evaluation for individualized goals  Treatment/Interventions: Functional transfer training, LE strengthening/ROM, Elevations, Therapeutic exercise, Endurance training, Patient/family training, Equipment eval/education, Bed mobility, Gait training, Continued evaluation, Spoke to nursing, OT  Equipment Recommended: Alexa Licea (at minimum currently; pt may benefit from sliding board)       See flowsheet documentation for full assessment, interventions and recommendations  Outcome: Progressing  Prognosis: Fair  Problem List: Decreased strength, Decreased range of motion, Decreased endurance, Impaired balance, Decreased mobility, Orthopedic restrictions, Pain, Decreased skin integrity  Assessment: Pt seen for PT treatment session this date with interventions consisting of gait training w/ emphasis on improving pt's ability to ambulate level surfaces x 80 ft with close S provided by therapist with RW, therapeutic activity consisting of training: sit<>stand transfers, static standing tolerance for 2 minutes w/ B UE support and vc and tactile cues for static standing posture faciliation and navigating 2 x 1 stairs w/ B handrail with step to pattern with close S  Pt agreeable to PT treatment session upon arrival, pt found seated OOB in recliner, in no apparent distress, A&O x 4 and responsive  In comparison to previous session, pt with improvements in tolerating greater amb distance, demonstrating safe PWB through R LE w/ all mobility at this time  Pt w/ continued fatigue w/ greater amb distances  Post session: pt returned back to recliner, all needs in reach and RN notified of session findings/recommendations  Continue to recommend STR vs  Home PT at time of d/c in order to maximize pt's functional independence and safety w/ mobility   Pt continues to be functioning below baseline level, and remains limited 2* factors listed above  PT will continue to see pt while here in order to address the deficits listed above and provide interventions consistent w/ POC in effort to achieve STGs  Barriers to Discharge: Inaccessible home environment, Decreased caregiver support     Recommendation: Short-term skilled PT (vs  HHPT, pending progress)     PT - OK to Discharge: Yes (if to STR when medically cleared)    See flowsheet documentation for full assessment

## 2018-08-07 ENCOUNTER — APPOINTMENT (INPATIENT)
Dept: RADIOLOGY | Facility: HOSPITAL | Age: 83
DRG: 464 | End: 2018-08-07
Payer: MEDICARE

## 2018-08-07 LAB
INR PPP: 3.19 (ref 0.86–1.17)
PROTHROMBIN TIME: 32.2 SECONDS (ref 11.8–14.2)

## 2018-08-07 PROCEDURE — 85610 PROTHROMBIN TIME: CPT | Performed by: PHYSICIAN ASSISTANT

## 2018-08-07 PROCEDURE — 71045 X-RAY EXAM CHEST 1 VIEW: CPT

## 2018-08-07 PROCEDURE — 36569 INSJ PICC 5 YR+ W/O IMAGING: CPT

## 2018-08-07 PROCEDURE — C1751 CATH, INF, PER/CENT/MIDLINE: HCPCS

## 2018-08-07 PROCEDURE — 99232 SBSQ HOSP IP/OBS MODERATE 35: CPT | Performed by: INTERNAL MEDICINE

## 2018-08-07 PROCEDURE — 99223 1ST HOSP IP/OBS HIGH 75: CPT | Performed by: NURSE PRACTITIONER

## 2018-08-07 RX ADMIN — CEFAZOLIN SODIUM 2000 MG: 2 SOLUTION INTRAVENOUS at 12:00

## 2018-08-07 RX ADMIN — AMLODIPINE BESYLATE 5 MG: 5 TABLET ORAL at 08:45

## 2018-08-07 RX ADMIN — ASPIRIN 81 MG 81 MG: 81 TABLET ORAL at 08:45

## 2018-08-07 RX ADMIN — FUROSEMIDE 20 MG: 20 TABLET ORAL at 08:45

## 2018-08-07 RX ADMIN — LISINOPRIL 20 MG: 20 TABLET ORAL at 08:45

## 2018-08-07 RX ADMIN — LEVOTHYROXINE SODIUM 88 MCG: 88 TABLET ORAL at 05:14

## 2018-08-07 RX ADMIN — CEFAZOLIN SODIUM 2000 MG: 2 SOLUTION INTRAVENOUS at 19:17

## 2018-08-07 RX ADMIN — CEFAZOLIN SODIUM 2000 MG: 2 SOLUTION INTRAVENOUS at 02:42

## 2018-08-07 RX ADMIN — ACETAMINOPHEN 325 MG: 325 TABLET, FILM COATED ORAL at 17:39

## 2018-08-07 RX ADMIN — Medication 1 TABLET: at 14:39

## 2018-08-07 RX ADMIN — ATORVASTATIN CALCIUM 10 MG: 10 TABLET, FILM COATED ORAL at 17:29

## 2018-08-07 RX ADMIN — METOPROLOL SUCCINATE 100 MG: 100 TABLET, EXTENDED RELEASE ORAL at 08:45

## 2018-08-07 NOTE — SOCIAL WORK
Out of pocket cost for IV antibiotics is approx 850$  Pt willing to pay as she is refusing STR option  Cm explained to pt that someone will have to be taught to do the LT IV antibiotic and she stated that her  cannot, but that maybe her sister or other friend can assist  She was to look into it  Also pt may be able to do self  Cm sent out referrals [facility] agencies in the [facility] looking for availability for a RN to do teaching in the home  Still awaiting medical clearance  ABHIJIT recently consulted   Cm spoke w SLIM who will see pt

## 2018-08-07 NOTE — PROCEDURES
PICC Line Insertion  Date/Time: 8/7/2018 1:39 PM  Performed by: July Washburn by: Yuriy MONTENEGRO     Patient location:  Bedside  Other Assisting Provider: No    Consent:     Consent obtained:  Written    Consent given by:  Patient  Universal protocol:     Procedure explained and questions answered to patient or proxy's satisfaction: yes      Relevant documents present and verified: yes      Test results available and properly labeled: yes      Required blood products, implants, devices, and special equipment available: yes      Site/side marked: yes      Immediately prior to procedure, a time out was called: yes      Patient identity confirmed:  Verbally with patient, hospital-assigned identification number and arm band  Pre-procedure details:     Hand hygiene: Hand hygiene performed prior to insertion      Sterile barrier technique: All elements of maximal sterile technique followed      Skin preparation:  ChloraPrep    Skin preparation agent: Skin preparation agent completely dried prior to procedure    Indications:     PICC line indications: long term antibiotics    Anesthesia (see MAR for exact dosages):      Anesthesia method:  Local infiltration    Local anesthetic:  Lidocaine 1% w/o epi  Procedure details:     Location:  Brachial    Vessel type: vein      Laterality:  Right    Approach: percutaneous technique used      Patient position:  Flat    Procedural supplies:  Double lumen    Catheter size:  5 Fr    Landmarks identified: yes      Ultrasound guidance: yes      Sterile ultrasound techniques: Sterile gel and sterile probe covers were used      Number of attempts:  1    Successful placement: yes      Vessel of catheter tip end:  Chest Xray needed to confirm placement    Total catheter length (cm):  40    Catheter out on skin (cm):  0    Max flow rate:  5 ml/sec    Arm circumference:  32  Post-procedure details:     Post-procedure:  Dressing applied and securement device placed Assessment:  Blood return through all ports and placement verification pending x-ray result    Post-procedure complications: none      Patient tolerance of procedure:   Tolerated well, no immediate complications

## 2018-08-07 NOTE — PROGRESS NOTES
Progress Note - Infectious Disease   Darrin Jeffers [de-identified] y o  female MRN: [id number]  Unit/Bed#: -01 Encounter: [id number]      Impression/Recommendations:  1    Suspect right ankle osteomyelitis  Although no obvious osteomyelitis was seen at surgery/hardware extraction, probability of osteomyelitis is extremely high, due to infected hardware  Will treat patient with long-term IV antibiotic  Continue high-dose IV cefazolin  Treat x4 weeks total, through 9/3      2  Right ankle hardware infection  Patient is now status post hardware extraction  She still needs long-term IV antibiotic, as in above  However, with no further hardware, she will not need antibiotic suppression afterwards  Antibiotic plan as in above      3   History of right ankle fracture with ORIF in 2018  Apparently, fracture has healed  Discussed with patient in detail regarding above plan  Discharge plan per Orthopedic Service  Follow up with us 2 weeks after discharge        Antibiotics:  Cefazolin # 2     Subjective:  Right ankle pain mild and improving  Temperature stays down  No chills  Patient is tolerating antibiotic well  No nausea, vomiting or diarrhea  Objective:  Vitals:  HR:  [92-94] 93  Resp:  [18-20] 20  BP: (121-128)/(64-70) 121/70  SpO2:  [90 %-97 %] 97 %  Temp (24hrs), Av °F (36 7 °C), Min:97 8 °F (36 6 °C), Max:98 4 °F (36 9 °C)  Current: Temperature: 98 4 °F (36 9 °C)    Physical Exam:     General: Awake, alert, cooperative, no distress  Neck:  Supple  No mass  No lymphadenopathy  Lungs: Expansion symmetric, no rales, no wheezing, respirations unlabored  Heart:  Regular rate and rhythm, S1 and S2 normal, no murmur  Abdomen: Soft, nondistended, non-tender, bowel sounds active all four quadrants,        no masses, no organomegaly  Extremities: Right ankle with dressing in place  Dressing is dry  Mild tenderness  No erythema  Trace edema  Skin:  No rash  Neuro:  Moves all extremities  Invasive Devices     Peripheral Intravenous Line            Peripheral IV 08/06/18 Left Wrist 1 day                Labs studies:   I have personally reviewed pertinent labs  Results from last 7 days  Lab Units 08/06/18  0944 08/03/18  0458   SODIUM mmol/L 135* 135*   POTASSIUM mmol/L 4 1 4 3   CHLORIDE mmol/L 100 102   CO2 mmol/L 31 29   ANION GAP mmol/L 4 4   BUN mg/dL 15 14   CREATININE mg/dL 0 82 0 89   EGFR ml/min/1 73sq m 68 61   GLUCOSE RANDOM mg/dL 127 93   CALCIUM mg/dL 8 6 8 5   AST U/L  --  16   ALT U/L  --  15   ALK PHOS U/L  --  60   TOTAL PROTEIN g/dL  --  6 0*   BILIRUBIN TOTAL mg/dL  --  0 30       Results from last 7 days  Lab Units 08/06/18  0944   WBC Thousand/uL 6 09   HEMOGLOBIN g/dL 12 3   PLATELETS Thousands/uL 326       Results from last 7 days  Lab Units 08/02/18  1350 08/02/18  1347 08/02/18  1341 08/02/18  1334   GRAM STAIN RESULT  Rare Polys  1+ Gram positive cocci in pairs Rare Polys  No bacteria seen No Polys or Bacteria seen No Polys or Bacteria seen       Imaging Studies:   I have personally reviewed pertinent imaging study reports and images in PACS  EKG, Pathology, and Other Studies:   I have personally reviewed pertinent reports

## 2018-08-07 NOTE — SOCIAL WORK
Pt to d c home w LT IV antibiotics  Referral sent to [facility] infusion to price out  Referral sent to [facility] as pt has a hx w that agency   Cm to follow

## 2018-08-07 NOTE — CONSULTS
Consultation - Karie Mclaughlin [de-identified] y o  female MRN: [id number]    Unit/Bed#: -01 Encounter: [id number]      Assessment/Plan     Assessment:  Patient has a history of a fib in which she takes Coumadin for  Patient explains that she takes 1 mg of Coumadin 6 days a week and 2 mg every Friday evening  She follows with [facility] lab and gets her INR checked every 4 days  She follows up with Dr Niki Gillette  With [facility] in [facility]:  · Hold Coumadin for tonight 8/7  · Recheck INR in a m  · If decreased restart on patient's 1 mg home dose  · If still elevated continue to hold    History of Present Illness     HPI: Karie Mclaughlin is a [de-identified]y o  year old female who presents with right ankle pain, swelling, and numbness status post right ankle fracture in March  Patient underwent ORIF on March 12th  She was discharged on March 14th to [address] [place] [address] rehab  Discharged from [address] [place] [address] for a on March 28th  Patient came into the ED on 07/30 underwent an I and D in the ER and was discharged on cefazolin patient presented again to the ED on 08/02, she is S/P removal of hardware      Inpatient consult to Internal Medicine  Consult performed by: Isaura Strickland  Consult ordered by: Dianelys Esteban          Review of Systems    Historical Information   Past Medical History:   Diagnosis Date    Atrial fibrillation ([address] )     Disease of thyroid gland     Hyperlipidemia     Hypertension      Past Surgical History:   Procedure Laterality Date    ORIF TIBIA & FIBULA FRACTURES Right 3/12/2018    Procedure: OPEN REDUCTION W/ INTERNAL FIXATION (ORIF) ANKLE  Open reduction internal fixation lateral malleolus with possible fixation of medial malleolus;  Surgeon: Maile Alamo MD;  Location: [facility];  Service: Orthopedics    AL I&D SOFT TISSUE ABSCESS SUBFASCIAL Right 8/2/2018    Procedure: Right ankle incision and drainage;  Surgeon: Maile Alamo MD;  Location: [facility]; Service: Orthopedics    IL REMOVAL DEEP IMPLANT Right 8/2/2018    Procedure: REMOVAL HARDWARE ANKLE;  Surgeon: Joseph Pruitt MD;  Location: [facility];  Service: Orthopedics    TOTAL THYROIDECTOMY       Social History   History   Alcohol Use No     History   Drug Use No     History   Smoking Status    Never Smoker   Smokeless Tobacco    Never Used     Family History:   Family History   Problem Relation Age of Onset    Heart disease Father        Meds/Allergies   all current active meds have been reviewed  No Known Allergies    Objective   Vitals: Blood pressure 121/70, pulse 93, temperature 98 4 °F (36 9 °C), temperature source Oral, resp  rate 20, height 5' 4" (1 626 m), weight 83 2 kg (183 lb 6 8 oz), SpO2 97 %  Intake/Output Summary (Last 24 hours) at 08/07/18 1541  Last data filed at 08/06/18 1940   Gross per 24 hour   Intake                0 ml   Output              850 ml   Net             -850 ml     Invasive Devices     Peripherally Inserted Central Catheter Line            PICC Line 08/07/18 Right Brachial less than 1 day          Peripheral Intravenous Line            Peripheral IV 08/06/18 Left Wrist 1 day                Physical Exam    Lab Results: I have personally reviewed pertinent reports  Imaging Studies: I have personally reviewed pertinent reports  EKG, Pathology, and Other Studies: I have personally reviewed pertinent reports  VTE Prophylaxis: Warfarin (Coumadin)    Code Status: Prior  Advance Directive and Living Will:      Power of :    POLST:      Counseling / Coordination of Care  Total floor / unit time spent today 30 minutes  Greater than 50% of total time was spent with the patient and / or family counseling and / or coordination of care  A description of the counseling / coordination of care:  Discussed plan at length with patient regarding her Coumadin    She is explained to Coumadin scheduled to be very clearly and explains that she follows up regularly and consistently gets her INR checked  Her primary care with multiple consults instructed from which dosage of could take  italo

## 2018-08-08 LAB
INR PPP: 3.78 (ref 0.86–1.17)
PROTHROMBIN TIME: 36.7 SECONDS (ref 11.8–14.2)

## 2018-08-08 PROCEDURE — 85610 PROTHROMBIN TIME: CPT | Performed by: PHYSICIAN ASSISTANT

## 2018-08-08 PROCEDURE — 97116 GAIT TRAINING THERAPY: CPT

## 2018-08-08 PROCEDURE — 99024 POSTOP FOLLOW-UP VISIT: CPT | Performed by: PHYSICIAN ASSISTANT

## 2018-08-08 PROCEDURE — 99232 SBSQ HOSP IP/OBS MODERATE 35: CPT | Performed by: INTERNAL MEDICINE

## 2018-08-08 PROCEDURE — 99231 SBSQ HOSP IP/OBS SF/LOW 25: CPT | Performed by: NURSE PRACTITIONER

## 2018-08-08 RX ADMIN — CEFAZOLIN SODIUM 2000 MG: 2 SOLUTION INTRAVENOUS at 19:55

## 2018-08-08 RX ADMIN — CEFAZOLIN SODIUM 2000 MG: 2 SOLUTION INTRAVENOUS at 03:07

## 2018-08-08 RX ADMIN — CEFAZOLIN SODIUM 2000 MG: 2 SOLUTION INTRAVENOUS at 11:45

## 2018-08-08 RX ADMIN — FUROSEMIDE 20 MG: 20 TABLET ORAL at 09:58

## 2018-08-08 RX ADMIN — METOPROLOL SUCCINATE 100 MG: 100 TABLET, EXTENDED RELEASE ORAL at 09:58

## 2018-08-08 RX ADMIN — Medication 1 TABLET: at 15:06

## 2018-08-08 RX ADMIN — LEVOTHYROXINE SODIUM 88 MCG: 88 TABLET ORAL at 05:03

## 2018-08-08 RX ADMIN — ASPIRIN 81 MG 81 MG: 81 TABLET ORAL at 09:58

## 2018-08-08 RX ADMIN — LISINOPRIL 20 MG: 20 TABLET ORAL at 09:58

## 2018-08-08 RX ADMIN — AMLODIPINE BESYLATE 5 MG: 5 TABLET ORAL at 09:58

## 2018-08-08 RX ADMIN — ATORVASTATIN CALCIUM 10 MG: 10 TABLET, FILM COATED ORAL at 17:59

## 2018-08-08 NOTE — PROGRESS NOTES
Progress Note - Orthopedics   Varghese Cramer [de-identified] y o  female MRN: [id number]  Unit/Bed#: [facility] XRAY      Subjective:    [de-identified] y  o female s/p right ankle hardware removal and I&D  No acute events  The patient wants to go home, but Internal medicine is monitoring her for her elevated INR  Pt doing well  Pain controlled   Denies fevers chills, CP, SOB    Labs:    0  Lab Value Date/Time   HCT 38 4 08/06/2018 0944   HCT 41 3 07/30/2018 1719   HCT 33 1 (L) 03/26/2018 0501   HGB 12 3 08/06/2018 0944   HGB 13 6 07/30/2018 1719   HGB 10 3 (L) 03/26/2018 0501   INR 3 78 (H) 08/08/2018 0502   WBC 6 09 08/06/2018 0944   WBC 11 27 (H) 07/30/2018 1719   WBC 7 62 03/26/2018 0501   ESR 21 (H) 07/30/2018 1719   CRP >90 0 (H) 07/30/2018 1719       Meds:    Current Facility-Administered Medications:     acetaminophen (TYLENOL) tablet 325 mg, 325 mg, Oral, Q6H PRN, Lakshmi Cook MD, 325 mg at 08/07/18 1739    amLODIPine (NORVASC) tablet 5 mg, 5 mg, Oral, Daily, Lakshmi Cook MD, 5 mg at 08/08/18 1000    aspirin chewable tablet 81 mg, 81 mg, Oral, Daily, Lakshmi Cook MD, 81 mg at 08/08/18 0671    atorvastatin (LIPITOR) tablet 10 mg, 10 mg, Oral, Daily With Meliza Sanchez MD, 10 mg at 08/07/18 1729    ceFAZolin (ANCEF) IVPB (premix) 2,000 mg, 2,000 mg, Intravenous, Q8H, Rosette Minor MD, Last Rate: 100 mL/hr at 08/08/18 1145, 2,000 mg at 08/08/18 1145    furosemide (LASIX) tablet 20 mg, 20 mg, Oral, Daily, Lakshmi Cook MD, 20 mg at 08/08/18 0958    HYDROmorphone (DILAUDID) tablet 2 mg, 2 mg, Oral, Q4H PRN, Lakshmi Cook MD, 2 mg at 08/06/18 2024    levothyroxine tablet 88 mcg, 88 mcg, Oral, Early Morning, Lakshmi Cook MD, 88 mcg at 08/08/18 0503    lisinopril (ZESTRIL) tablet 20 mg, 20 mg, Oral, Daily, Lakshmi Cook MD, 20 mg at 08/08/18 0958    metoprolol succinate (TOPROL-XL) 24 hr tablet 100 mg, 100 mg, Oral, Daily, Lakshmi Cook MD, 100 mg at 08/08/18 0958    multivitamin-minerals (CENTRUM) tablet 1 tablet, 1 tablet, Oral, Daily, Jan Mayfield MD, 1 tablet at 08/07/18 1439    Blood Culture:   No results found for: BLOODCX    Wound Culture:   No results found for: WOUNDCULT    Ins and Outs:  I/O last 24 hours: In: 480 [P O :480]  Out: -           Physical:  Vitals:    08/08/18 0700   BP: 131/67   Pulse: 84   Resp: 18   Temp: 98 1 °F (36 7 °C)   SpO2: 91%     Musculoskeletal: right Lower Extremity  · Dressing removed: Incision clean, dry and intact without signs of infection  No drainage  · FROM with the right knee and ankle  · SILT L1-S1  +PT pulse    _*_*_*_*_*_*_*_*_*_*_*_*_*_*_*_*_*_*_*_*_*_*_*_*_*_*_*_*_*_*_*_*_*_*_*_*_*_*_*_*_*    Assessment:    [de-identified] y  o female right right ankle infection s/p hardware removal and I&D  POD #6    Plan:  · Dry dressing changes daily and order placed  Dressing change was done today  · Continue partial WB through the heel of the RLE  · PT recommends discharge home with home therapy and family support vs STR    (HHPT vs  STR, pending progress)  · Pain control  · Antibiotics as per infectious disease  PICC line is in place  · Internal medicine has been following the patient for a high INR  · DVT ppx  · Dispo: May discharge from an orthopedic standpoint  Follow up with Dr Jocelyn Carlin in 12 days        Wilfredo Wilhelm PA-C

## 2018-08-08 NOTE — PHYSICAL THERAPY NOTE
Physical Therapy Treatment Note       08/08/18 2825   Pain Assessment   Pain Assessment No/denies pain   Pain Score No Pain   Restrictions/Precautions   Weight Bearing Precautions Per Order Yes   RLE Weight Bearing Per Order PWB   Braces or Orthoses Other (Comment)  (surgical shoe per ortho)   Other Precautions WBS; Fall Risk;Multiple lines   General   Chart Reviewed Yes   Response to Previous Treatment Patient with no complaints from previous session  Family/Caregiver Present Yes   Cognition   Overall Cognitive Status WFL   Arousal/Participation Alert; Responsive; Cooperative   Attention Within functional limits   Orientation Level Oriented X4   Memory Within functional limits   Following Commands Follows all commands and directions without difficulty   Comments pt agreeable to PT session   Subjective   Subjective "I want to go home"   Bed Mobility   Rolling R Unable to assess  (pt received OOB in recliner)   Transfers   Sit to Stand 5  Supervision   Additional items Assist x 1; Armrests; Increased time required   Stand to Sit 5  Supervision   Additional items Assist x 1; Armrests; Increased time required   Additional Comments Educated pt on PWB and lateral weight shifting via UE support  Pt denying any pain to L wrist with transfers, notes chronic issue- as was cleared by MD for [facility] and normal use per pt, notes she wears splint at times for comfort  Pt requesting to flora her moccasins for ambulation at this time   Ambulation/Elevation   Gait pattern Antalgic;Decreased foot clearance;Decreased R stance; Foward flexed; Short stride; Excessively slow   Gait Assistance 5  Supervision   Additional items Assist x 1;Verbal cues; Tactile cues   Assistive Device Rolling walker   Distance 150'   Balance   Static Sitting Good   Dynamic Sitting Fair +   Static Standing Fair   Dynamic Standing Fair   Ambulatory Fair   Endurance Deficit   Endurance Deficit Yes   Activity Tolerance   Activity Tolerance Patient limited by fatigue Nurse Made Aware Yes, RN Shani Menendez verbalized pt appropriate for PT session, made aware of outcomes/recs   Assessment   Prognosis Good   Problem List Decreased strength;Decreased range of motion;Decreased endurance; Impaired balance;Decreased mobility;Orthopedic restrictions;Pain;Decreased skin integrity   Assessment Pt seen for PT treatment session this date with interventions consisting of gait training w/ emphasis on improving pt's ability to ambulate level surfaces x 150 ft with distant S provided by therapist with RW and therapeutic activity consisting of training: sit<>stand transfers, static standing tolerance for 1-2 minutes w/ B UE support and vc and tactile cues for static standing posture faciliation  Pt agreeable to PT treatment session upon arrival, pt found seated OOB in recliner, in no apparent distress, A&O x 4 and responsive  In comparison to previous session, pt with improvements in tolerating greater amb distances w/ no overt LOB noted  Post session: pt returned back to recliner, all needs in reach and RN notified of session findings/recommendations  Continue to recommend Home PT vs  STR at time of d/c in order to maximize pt's functional independence and safety w/ mobility  Pt continues to be functioning below baseline level, and remains limited 2* factors listed above  PT will continue to see pt while here in order to address the deficits listed above and provide interventions consistent w/ POC in effort to achieve STGs  Barriers to Discharge Inaccessible home environment;Decreased caregiver support   Goals   Patient Goals "to return home"   STG Expiration Date 08/13/18   Short Term Goal #1 STGs remain appropriate   Treatment Day 4   Plan   Treatment/Interventions Functional transfer training;LE strengthening/ROM; Elevations; Therapeutic exercise; Endurance training;Patient/family training;Equipment eval/education; Bed mobility;Gait training;Continued evaluation;Spoke to nursing;OT   Progress Slow progress, decreased activity tolerance   PT Frequency 5x/wk   Recommendation   Recommendation Home PT; Home with family support  (HHPT vs  STR, pending progress)   Equipment Recommended Lydia Smart PT    Time of PT treatment session: [id number]

## 2018-08-08 NOTE — PLAN OF CARE
Problem: PHYSICAL THERAPY ADULT  Goal: Performs mobility at highest level of function for planned discharge setting  See evaluation for individualized goals  Treatment/Interventions: Functional transfer training, LE strengthening/ROM, Elevations, Therapeutic exercise, Endurance training, Patient/family training, Equipment eval/education, Bed mobility, Gait training, Continued evaluation, Spoke to nursing, OT  Equipment Recommended: Gauri Shah (at minimum currently; pt may benefit from sliding board)       See flowsheet documentation for full assessment, interventions and recommendations  Outcome: Progressing  Prognosis: Good  Problem List: Decreased strength, Decreased range of motion, Decreased endurance, Impaired balance, Decreased mobility, Orthopedic restrictions, Pain, Decreased skin integrity  Assessment: Pt seen for PT treatment session this date with interventions consisting of gait training w/ emphasis on improving pt's ability to ambulate level surfaces x 150 ft with distant S provided by therapist with RW and therapeutic activity consisting of training: sit<>stand transfers, static standing tolerance for 1-2 minutes w/ B UE support and vc and tactile cues for static standing posture faciliation  Pt agreeable to PT treatment session upon arrival, pt found seated OOB in recliner, in no apparent distress, A&O x 4 and responsive  In comparison to previous session, pt with improvements in tolerating greater amb distances w/ no overt LOB noted  Post session: pt returned back to recliner, all needs in reach and RN notified of session findings/recommendations  Continue to recommend Home PT vs  STR at time of d/c in order to maximize pt's functional independence and safety w/ mobility  Pt continues to be functioning below baseline level, and remains limited 2* factors listed above   PT will continue to see pt while here in order to address the deficits listed above and provide interventions consistent w/ POC in effort to achieve STGs  Barriers to Discharge: Inaccessible home environment, Decreased caregiver support     Recommendation: Home PT, Home with family support (HHPT vs  STR, pending progress)     PT - OK to Discharge: Yes (if to STR when medically cleared)    See flowsheet documentation for full assessment

## 2018-08-08 NOTE — PROGRESS NOTES
Progress Note - Infectious Disease   Chaz Snow [de-identified] y o  female MRN: [id number]  Unit/Bed#: -01 Encounter: [id number]      Impression/Recommendations: 1    Suspect right ankle osteomyelitis   Although no obvious osteomyelitis was seen at surgery/hardware extraction, probability of osteomyelitis is extremely high, due to infected hardware   Will treat patient with long-term IV antibiotic  Continue high-dose IV cefazolin  Treat x4 weeks total, another 39 days, through 9/3  Check weekly CBCD/BMP while on IV antibiotic      2  Right ankle hardware infection   Patient is now status post hardware extraction   She still needs long-term IV antibiotic, as in above   However, with no further hardware, she will not need antibiotic suppression afterwards  Antibiotic plan as in above      3   History of right ankle fracture with ORIF in 2018   Apparently, fracture has healed  4   Disposition  Defer to primary service regarding plan for discharge to home versus rehabilitation      Discussed with patient in detail regarding above plan  Okay for discharge from ID viewpoint  Follow up with us 2 weeks after discharge        Antibiotics:  Cefazolin # 3     Subjective:  Right ankle pain minimal   Temperature stays down   No chills  Patient is tolerating antibiotic well  No nausea, vomiting or diarrhea  Objective:  Vitals:  HR:  [84-97] 84  Resp:  [18-20] 18  BP: (119-131)/(67-76) 131/67  SpO2:  [91 %-95 %] 91 %  Temp (24hrs), Av °F (36 7 °C), Min:97 6 °F (36 4 °C), Max:98 2 °F (36 8 °C)  Current: Temperature: 98 1 °F (36 7 °C)    Physical Exam:     General: Awake, alert, cooperative, no distress  Neck:  Supple  No mass  No lymphadenopathy  Lungs: Expansion symmetric, no rales, no wheezing, respirations unlabored  Heart:  Regular rate and rhythm, S1 and S2 normal, no murmur     Abdomen: Soft, nondistended, non-tender, bowel sounds active all four quadrants,        no masses, no organomegaly  Extremities: Right ankle with dressing in place  Dressing is dry  No erythema  No tenderness  Trace edema  Skin:  No rash  Neuro: Moves all extremities  Invasive Devices     Peripherally Inserted Central Catheter Line            PICC Line 08/07/18 Right Brachial less than 1 day                Labs studies:   I have personally reviewed pertinent labs  Results from last 7 days  Lab Units 08/06/18  0944 08/03/18  0458   SODIUM mmol/L 135* 135*   POTASSIUM mmol/L 4 1 4 3   CHLORIDE mmol/L 100 102   CO2 mmol/L 31 29   ANION GAP mmol/L 4 4   BUN mg/dL 15 14   CREATININE mg/dL 0 82 0 89   EGFR ml/min/1 73sq m 68 61   GLUCOSE RANDOM mg/dL 127 93   CALCIUM mg/dL 8 6 8 5   AST U/L  --  16   ALT U/L  --  15   ALK PHOS U/L  --  60   TOTAL PROTEIN g/dL  --  6 0*   BILIRUBIN TOTAL mg/dL  --  0 30       Results from last 7 days  Lab Units 08/06/18  0944   WBC Thousand/uL 6 09   HEMOGLOBIN g/dL 12 3   PLATELETS Thousands/uL 326       Results from last 7 days  Lab Units 08/02/18  1350 08/02/18  1347 08/02/18  1341 08/02/18  1334   GRAM STAIN RESULT  Rare Polys  1+ Gram positive cocci in pairs Rare Polys  No bacteria seen No Polys or Bacteria seen No Polys or Bacteria seen       Imaging Studies:   I have personally reviewed pertinent imaging study reports and images in PACS  EKG, Pathology, and Other Studies:   I have personally reviewed pertinent reports

## 2018-08-08 NOTE — ASSESSMENT & PLAN NOTE
· Patient underwent hardware removal on 8/2, no evidence of osteo in the OR however symptomatic concern for osteomyelitis  · PICC line placed for IV antibiotics  · Restart IV cefazolin  · Treat x4 weeks total, through 9/3    · Infectious Disease following  · Case management following

## 2018-08-08 NOTE — PROGRESS NOTES
[address] Internal Medicine  Progress Note - Austin Cervantes 1935, [de-identified] y o  female MRN: [id number]    Unit/Bed#: -01 Encounter: [id number]    Primary Care Provider: Lalita Birmingham DO   Date and time admitted to hospital: 8/2/2018 11:12 AM        Atrial fibrillation [facility])   Assessment & Plan    · Patient uses Coumadin for anticoagulation  · Patient is on anyone mg 6 days a week 2 mg 1 day a week schedule  · Consulted for supratherapeutic INR  INR today increased from yesterday to 3 78  · Will hold Coumadin again tonight and recheck tomorrow        * Status post ORIF of fracture of ankle   Assessment & Plan    · Patient underwent hardware removal on 8/2, no evidence of osteo in the OR however symptomatic concern for osteomyelitis  · PICC line placed for IV antibiotics  · Restart IV cefazolin  · Treat x4 weeks total, through 9/3  · Infectious Disease following  · Case management following               VTE Pharmacologic Prophylaxis:   Pharmacologic: Warfarin (Coumadin)  Mechanical VTE Prophylaxis in Place: Yes    Patient Centered Rounds: I have performed bedside rounds with nursing staff today  Discussions with Specialists or Other Care Team Provider:  Discussed with case management and primary RN    Education and Discussions with Family / Patient:  Discussed with patient    Time Spent for Care: 15 minutes  More than 50% of total time spent on counseling and coordination of care as described above      Current Length of Stay: 6 day(s)    Current Patient Status: Inpatient   Certification Statement: The patient will continue to require additional inpatient hospital stay due to INR monitoring currently supratherapeutic and at risk for bleeding    Discharge Plan / Estimated Discharge Date:  Once INR stabilized IV antibiotics are finalized and cleared by surgery      Code Status: Prior      Subjective:   Patient expresses significant frustration regarding still being in the hospital she is unsure whether or not she should go to rehab for IV antibiotics or if she will be able to manage at home  she is still frustrated that her INR is supratherapeutic after expressing her concern receiving 2 5 mg of Coumadin  Emotional support provided    Objective:     Vitals:   Temp (24hrs), Av °F (36 7 °C), Min:97 6 °F (36 4 °C), Max:98 2 °F (36 8 °C)    HR:  [84-97] 84  Resp:  [18-20] 18  BP: (119-131)/(67-76) 131/67  SpO2:  [91 %-95 %] 91 %  Body mass index is 31 48 kg/m²  Input and Output Summary (last 24 hours):     No intake or output data in the 24 hours ending 18 1044    Physical Exam:     Physical Exam   Constitutional: She is oriented to person, place, and time  She appears well-developed and well-nourished  HENT:   Head: Normocephalic  Neck: Normal range of motion  Neck supple  Cardiovascular: Normal rate  Pulmonary/Chest: Effort normal and breath sounds normal    Abdominal: Soft  Bowel sounds are normal    Neurological: She is alert and oriented to person, place, and time  Skin: Skin is warm and dry  Psychiatric: She has a normal mood and affect  Her behavior is normal  Judgment normal    Nursing note and vitals reviewed  Additional Data:     Labs:      Results from last 7 days  Lab Units 18  0944   WBC Thousand/uL 6 09   HEMOGLOBIN g/dL 12 3   HEMATOCRIT % 38 4   PLATELETS Thousands/uL 326   NEUTROS PCT % 69   LYMPHS PCT % 14   MONOS PCT % 12   EOS PCT % 3       Results from last 7 days  Lab Units 18  0944 18  0458   SODIUM mmol/L 135* 135*   POTASSIUM mmol/L 4 1 4 3   CHLORIDE mmol/L 100 102   CO2 mmol/L 31 29   BUN mg/dL 15 14   CREATININE mg/dL 0 82 0 89   CALCIUM mg/dL 8 6 8 5   TOTAL PROTEIN g/dL  --  6 0*   BILIRUBIN TOTAL mg/dL  --  0 30   ALK PHOS U/L  --  60   ALT U/L  --  15   AST U/L  --  16   GLUCOSE RANDOM mg/dL 127 93       Results from last 7 days  Lab Units 18  0502   INR  3 78*       * I Have Reviewed All Lab Data Listed Above    * Additional Pertinent Lab Tests Reviewed: Allison 66 Admission Reviewed      Recent Cultures (last 7 days):       Results from last 7 days  Lab Units 08/02/18  1350 08/02/18  1347 08/02/18  1341 08/02/18  1334   GRAM STAIN RESULT  Rare Polys  1+ Gram positive cocci in pairs Rare Polys  No bacteria seen No Polys or Bacteria seen No Polys or Bacteria seen       Last 24 Hours Medication List:     Current Facility-Administered Medications:  acetaminophen 325 mg Oral Q6H PRN Nabila Alfonso MD    amLODIPine 5 mg Oral Daily Nabila Alfonso MD    aspirin 81 mg Oral Daily Nabila Alfonso MD    atorvastatin 10 mg Oral Daily With Nikki Iyer MD    cefazolin 2,000 mg Intravenous Q8H Osvaldo Howe MD Last Rate: 2,000 mg (08/08/18 0307)   furosemide 20 mg Oral Daily Nabila Alfonso MD    HYDROmorphone 2 mg Oral Q4H PRN Nabila Alfonso MD    levothyroxine 88 mcg Oral Early Morning Nabila Alfonso MD    lisinopril 20 mg Oral Daily Nabila Alfonso MD    metoprolol succinate 100 mg Oral Daily Nabila Alfonso MD    multivitamin-minerals 1 tablet Oral Daily Nabila Alfonso MD         Today, Patient Was Seen By: ORLIN Solo    ** Please Note: Dragon 360 Dictation voice to text software may have been used in the creation of this document   **

## 2018-08-08 NOTE — ASSESSMENT & PLAN NOTE
· Patient uses Coumadin for anticoagulation  · Patient is on a 1mg 6 days a week, 2 mg 1 day a week schedule  · Consulted for supratherapeutic INR    INR today increased from yesterday to 3 78  · Will hold Coumadin again tonight and recheck tomorrow

## 2018-08-09 LAB
INR PPP: 3.69 (ref 0.86–1.17)
PROTHROMBIN TIME: 36 SECONDS (ref 11.8–14.2)

## 2018-08-09 PROCEDURE — 85610 PROTHROMBIN TIME: CPT | Performed by: PHYSICIAN ASSISTANT

## 2018-08-09 PROCEDURE — 97530 THERAPEUTIC ACTIVITIES: CPT

## 2018-08-09 PROCEDURE — 99232 SBSQ HOSP IP/OBS MODERATE 35: CPT | Performed by: PHYSICIAN ASSISTANT

## 2018-08-09 PROCEDURE — 97116 GAIT TRAINING THERAPY: CPT

## 2018-08-09 PROCEDURE — 99232 SBSQ HOSP IP/OBS MODERATE 35: CPT | Performed by: INTERNAL MEDICINE

## 2018-08-09 RX ADMIN — Medication 1 TABLET: at 14:00

## 2018-08-09 RX ADMIN — LEVOTHYROXINE SODIUM 88 MCG: 88 TABLET ORAL at 05:18

## 2018-08-09 RX ADMIN — CEFAZOLIN SODIUM 2000 MG: 2 SOLUTION INTRAVENOUS at 19:09

## 2018-08-09 RX ADMIN — AMLODIPINE BESYLATE 5 MG: 5 TABLET ORAL at 08:00

## 2018-08-09 RX ADMIN — ASPIRIN 81 MG 81 MG: 81 TABLET ORAL at 08:00

## 2018-08-09 RX ADMIN — FUROSEMIDE 20 MG: 20 TABLET ORAL at 08:00

## 2018-08-09 RX ADMIN — CEFAZOLIN SODIUM 2000 MG: 2 SOLUTION INTRAVENOUS at 10:30

## 2018-08-09 RX ADMIN — CEFAZOLIN SODIUM 2000 MG: 2 SOLUTION INTRAVENOUS at 03:38

## 2018-08-09 RX ADMIN — METOPROLOL SUCCINATE 100 MG: 100 TABLET, EXTENDED RELEASE ORAL at 08:00

## 2018-08-09 RX ADMIN — ATORVASTATIN CALCIUM 10 MG: 10 TABLET, FILM COATED ORAL at 16:01

## 2018-08-09 RX ADMIN — LISINOPRIL 20 MG: 20 TABLET ORAL at 08:00

## 2018-08-09 NOTE — PHYSICAL THERAPY NOTE
Physical Therapy Treatment Note       08/09/18 4944   Pain Assessment   Pain Assessment No/denies pain  (pre and post session)   Pain Score No Pain   Restrictions/Precautions   Weight Bearing Precautions Per Order Yes   RLE Weight Bearing Per Order PWB  (through heel)   Braces or Orthoses (surgical shoe per ortho- retrieved size small per pt)   Other Precautions WBS; Fall Risk;Multiple lines   General   Chart Reviewed Yes   Response to Previous Treatment Patient with no complaints from previous session  Family/Caregiver Present No   Cognition   Overall Cognitive Status WFL   Arousal/Participation Alert; Responsive; Cooperative   Attention Within functional limits   Orientation Level Oriented X4   Memory Within functional limits   Following Commands Follows all commands and directions without difficulty   Comments pt agreeable to PT session, pleasant, cooperative   Subjective   Subjective "I want to go home today"   Bed Mobility   Additional Comments pt received seated OOB in recliner upon arrival   Transfers   Sit to Stand 5  Supervision   Additional items Assist x 1; Armrests; Increased time required;Verbal cues  (for appropriate hand/foot placement)   Stand to Sit 5  Supervision   Additional items Assist x 1; Armrests; Increased time required;Verbal cues  (for B UE eccentric control)   Ambulation/Elevation   Gait pattern Antalgic;Decreased foot clearance;Decreased R stance; Foward flexed; Short stride; Excessively slow   Gait Assistance 5  Supervision   Additional items Assist x 1;Verbal cues; Tactile cues   Assistive Device Rolling walker   Distance 70' x2   Stair Management Assistance (pt declining trial)   Balance   Static Sitting Good   Dynamic Sitting Fair +   Static Standing Fair   Dynamic Standing Fair   Ambulatory Fair   Endurance Deficit   Endurance Deficit Yes   Activity Tolerance   Activity Tolerance Patient limited by fatigue   Medical Staff Made Aware spoke to Iraida BLACK   Nurse Made Aware Yes, VENECIA Gonzales verbalized pt appropriate for PT session, made aware of outcomes/recs   Assessment   Prognosis Good   Problem List Decreased strength;Decreased range of motion;Decreased endurance; Impaired balance;Decreased mobility;Orthopedic restrictions;Pain;Decreased skin integrity   Assessment Pt seen for PT treatment session this date with interventions consisting of gait training w/ emphasis on improving pt's ability to ambulate level surfaces x 70' x2 ft with SBA provided by therapist with RW and therapeutic activity consisting of training: sit<>stand transfers, static standing tolerance for 2 minutes w/ B UE support and vc and tactile cues for static standing posture faciliation  Pt agreeable to PT treatment session upon arrival, pt found seated OOB in recliner, in no apparent distress, A&O x 4 and responsive  Pt maintaining PWB via heel of R LE without vc from therapist required  In comparison to previous session, pt with improvements in continuation of level surface ambulation with RW support, pt declining participation in seated ther ex- noting she is performing on own outside of therapy session  Post session: pt returned back to recliner, all needs in reach and RN notified of session findings/recommendations and NSG staff educated/encouraged to mobilize A of 1 with RW  Continue to recommend Home PT with family support vs STR at time of d/c in order to maximize pt's functional independence and safety w/ mobility  Pt continues to be functioning below baseline level, and remains limited 2* factors listed above  PT will continue to see pt while here in order to address the deficits listed above and provide interventions consistent w/ POC in effort to achieve STGs     Barriers to Discharge Inaccessible home environment;Decreased caregiver support   Goals   Patient Goals to return home   STG Expiration Date 08/13/18   Short Term Goal #1 STGs remain appropriate   Treatment Day 5   Plan   Treatment/Interventions Functional transfer training;LE strengthening/ROM; Elevations; Therapeutic exercise; Endurance training;Patient/family training;Equipment eval/education; Bed mobility;Gait training;Continued evaluation;Spoke to nursing;OT   Progress Progressing toward goals   PT Frequency 5x/wk; Weekend   Recommendation   Recommendation Home PT; Home with family support  (HHPT vs  STR, pending progress)   Equipment Recommended Walker  (RW)   PT - OK to Discharge Yes  (when medically cleared)   Additional Comments pt noting no concerns to return home, cleared stairs with PT on 8/6       Brianda Jacques, PT, DPT    Time of PT treatment session: [phone]

## 2018-08-09 NOTE — PROGRESS NOTES
Pt successfully and safely performed IVABT administration this morning on her own  Reminded to maintain sterility of IV attachments and to perform hand hygiene before and after administration

## 2018-08-09 NOTE — PLAN OF CARE
Problem: PHYSICAL THERAPY ADULT  Goal: Performs mobility at highest level of function for planned discharge setting  See evaluation for individualized goals  Treatment/Interventions: Functional transfer training, LE strengthening/ROM, Elevations, Therapeutic exercise, Endurance training, Patient/family training, Equipment eval/education, Bed mobility, Gait training, Continued evaluation, Spoke to nursing, OT  Equipment Recommended: Wisam Ledbetter (at minimum currently; pt may benefit from sliding board)       See flowsheet documentation for full assessment, interventions and recommendations  Outcome: Progressing  Prognosis: Good  Problem List: Decreased strength, Decreased range of motion, Decreased endurance, Impaired balance, Decreased mobility, Orthopedic restrictions, Pain, Decreased skin integrity  Assessment: Pt seen for PT treatment session this date with interventions consisting of gait training w/ emphasis on improving pt's ability to ambulate level surfaces x 70' x2 ft with SBA provided by therapist with RW and therapeutic activity consisting of training: sit<>stand transfers, static standing tolerance for 2 minutes w/ B UE support and vc and tactile cues for static standing posture faciliation  Pt agreeable to PT treatment session upon arrival, pt found seated OOB in recliner, in no apparent distress, A&O x 4 and responsive  Pt maintaining PWB via heel of R LE without vc from therapist required  In comparison to previous session, pt with improvements in continuation of level surface ambulation with RW support, pt declining participation in seated ther ex- noting she is performing on own outside of therapy session  Post session: pt returned back to recliner, all needs in reach and RN notified of session findings/recommendations and NSG staff educated/encouraged to mobilize A of 1 with RW   Continue to recommend Home PT with family support vs STR at time of d/c in order to maximize pt's functional independence and safety w/ mobility  Pt continues to be functioning below baseline level, and remains limited 2* factors listed above  PT will continue to see pt while here in order to address the deficits listed above and provide interventions consistent w/ POC in effort to achieve STGs  Barriers to Discharge: Inaccessible home environment, Decreased caregiver support     Recommendation: Home PT, Home with family support (HHPT vs  STR, pending progress)     PT - OK to Discharge: Yes (when medically cleared)    See flowsheet documentation for full assessment

## 2018-08-09 NOTE — PROGRESS NOTES
Progress Note - Infectious Disease   Adal He [de-identified] y o  female MRN: [id number]  Unit/Bed#: -01 Encounter: [id number]      Impression/Recommendations: 1    Suspect right ankle osteomyelitis   Although no obvious osteomyelitis was seen at surgery/hardware extraction, probability of osteomyelitis is extremely high, due to infected hardware   Will treat patient with long-term IV antibiotic  Continue high-dose IV cefazolin  Treat x4 weeks total, another 24 days, through 9/3  Check weekly CBCD/BMP while on IV antibiotic      2  Right ankle hardware infection   Patient is now status post hardware extraction   She still needs long-term IV antibiotic, as in above   However, with no further hardware, she will not need antibiotic suppression afterwards  Antibiotic plan as in above      3   History of right ankle fracture with ORIF in 2018   Apparently, fracture has healed      Discussed with patient in detail regarding above plan  Okay for discharge from ID viewpoint  Follow up with us 2 weeks after discharge        Antibiotics:  Cefazolin # 4     Subjective:  Right ankle pain minimal   Temperature stays down   No chills  Patient is tolerating antibiotic well   No nausea, vomiting or diarrhea  Objective:  Vitals:  HR:  [89-98] 89  Resp:  [18] 18  BP: ()/(64-81) 134/81  SpO2:  [95 %-96 %] 95 %  Temp (24hrs), Av 9 °F (36 6 °C), Min:97 8 °F (36 6 °C), Max:98 °F (36 7 °C)  Current: Temperature: 97 8 °F (36 6 °C)    Physical Exam:     General: Awake, alert, cooperative, no distress  Neck:  Supple  No mass  No lymphadenopathy  Lungs: Expansion symmetric, no rales, no wheezing, respirations unlabored  Heart:  Regular rate and rhythm, S1 and S2 normal, no murmur  Abdomen: Soft, nondistended, non-tender, bowel sounds active all four quadrants,        no masses, no organomegaly  Extremities: Right foot/ankle with dressing in place  Dressing is dry  No erythema  Minimal edema    Minimal tenderness  Skin:  No rash  Neuro: Moves all extremities  Invasive Devices     Peripherally Inserted Central Catheter Line            PICC Line 08/07/18 Right Brachial 1 day                Labs studies:   I have personally reviewed pertinent labs  Results from last 7 days  Lab Units 08/06/18  0944 08/03/18  0458   SODIUM mmol/L 135* 135*   POTASSIUM mmol/L 4 1 4 3   CHLORIDE mmol/L 100 102   CO2 mmol/L 31 29   ANION GAP mmol/L 4 4   BUN mg/dL 15 14   CREATININE mg/dL 0 82 0 89   EGFR ml/min/1 73sq m 68 61   GLUCOSE RANDOM mg/dL 127 93   CALCIUM mg/dL 8 6 8 5   AST U/L  --  16   ALT U/L  --  15   ALK PHOS U/L  --  60   TOTAL PROTEIN g/dL  --  6 0*   BILIRUBIN TOTAL mg/dL  --  0 30       Results from last 7 days  Lab Units 08/06/18  0944   WBC Thousand/uL 6 09   HEMOGLOBIN g/dL 12 3   PLATELETS Thousands/uL 326       Results from last 7 days  Lab Units 08/02/18  1350 08/02/18  1347 08/02/18  1341 08/02/18  1334   GRAM STAIN RESULT  Rare Polys  1+ Gram positive cocci in pairs Rare Polys  No bacteria seen No Polys or Bacteria seen No Polys or Bacteria seen       Imaging Studies:   I have personally reviewed pertinent imaging study reports and images in PACS  EKG, Pathology, and Other Studies:   I have personally reviewed pertinent reports

## 2018-08-09 NOTE — ASSESSMENT & PLAN NOTE
· Concern for right ankle osteomyelitis from hardware infection   · Patient underwent hardware removal on 8/2  · ID following,  · PICC line placed for IV antibiotics  · Continue IV cefazolin for a total treatment of 4 weeks total, through 9/3  · Orthopedics primary,  · Continue dressing changes daily  · PT recommending home therapy vs  Family support  · Pt to follow up with dr Raji Stevenson in 12 days

## 2018-08-09 NOTE — PLAN OF CARE
Problem: DISCHARGE PLANNING - CARE MANAGEMENT  Goal: Discharge to post-acute care or home with appropriate resources  INTERVENTIONS:  - Conduct assessment to determine patient/family and health care team treatment goals, and need for post-acute services based on payer coverage, community resources, and patient preferences, and barriers to discharge  - Address psychosocial, clinical, and financial barriers to discharge as identified in assessment in conjunction with the patient/family and health care team  - Arrange appropriate level of post-acute services according to patient's   needs and preference and payer coverage in collaboration with the physician and health care team  - Communicate with and update the patient/family, physician, and health care team regarding progress on the discharge plan  - Arrange appropriate transportation to post-acute venues   Outcome: Progressing  CM met with patient in her room  Patient states she wants to do her own IV ABX at home and she feels she is capable of doing it  Patient was informed [facility] will deliver IV [facility]X and Patient was informed [facility]  will provide wound care and teach the IV ABX infusion  Patient states she has a sister-Dotty who lives in [place] who willing to stay with her for as longs as needed to assist with care  Patient is hopeful she can be discharged home tomorrow  CM will follow patient's needs  Nurse and SLIM notified

## 2018-08-09 NOTE — SOCIAL WORK
CM met with patient in her room  Patient states she wants to do her own IV ABX at home and she feels she is capable of doing it  Patient was informed [facility] will deliver IV ABX and Patient was informed [facility]  will provide wound care and teach the IV ABX infusion  Patient states she has a sister-Dotty who lives in [facility] who willing to stay with her for as longs as needed to assist with care  Patient is hopeful she can be discharged home tomorrow  CM will follow patient's needs  Nurse and SLIM notified

## 2018-08-09 NOTE — PROGRESS NOTES
Progress Note - Zaid Pina 1935, [de-identified] y o  female MRN: [id number]    Unit/Bed#: -01 Encounter: [id number]    Primary Care Provider: Jatin White DO   Date and time admitted to hospital: 8/2/2018 11:12 AM        DOS: 8/9/2018      * Status post ORIF of fracture of ankle   Assessment & Plan    · Concern for right ankle osteomyelitis from hardware infection   · Patient underwent hardware removal on 8/2  · ID following,  · PICC line placed for IV antibiotics  · Continue IV cefazolin for a total treatment of 4 weeks total, through 9/3  · Orthopedics primary,  · Continue dressing changes daily  · PT recommending home therapy vs  Family support  · Pt to follow up with dr Gabi Segura in 12 days         Atrial fibrillation [facility])   Assessment & Plan    · On a/c with Coumadin  · Patient is on a 1mg, 6 days a week, 2 mg 1 day a week schedule, was receiving a few days worth of 2 5 mg here  Reasoning unknown  · Consulted for supratherapeutic INR  INR today mildly improved to 3 69  · Continue to hold Coumadin and recheck INR in the AM  · Needs to improve prior to discharge as patient is a fall risk at home, refusing STR          VTE Pharmacologic Prophylaxis:   Pharmacologic: Warfarin (Coumadin) on hold due to supratherapeutic INR  Mechanical VTE Prophylaxis in Place: Yes    Patient Centered Rounds: I have evaluated patient without nursing staff present due to speaking to nurse outside patient's room    Discussions with Specialists or Other Care Team Provider: Discussed with orthopedics, RN, CM and reviewed previous notes  Education and Discussions with Family / Patient: Discussed with patient at bedside, when asked to update friends or family members patient politely declined    Time Spent for Care: 20 minutes  More than 50% of total time spent on counseling and coordination of care as described above  Current Length of Stay: 7 day(s)    Current Patient Status: Inpatient   Certification Statement:  The patient will continue to require additional inpatient hospital stay due to Awaiting therapeutic INR, patient is still supratherapeutic and a fall risk at home    Discharge Plan: Pt refusing STR, awaiting for INR to be stabilized  Per orthopedic surgery recs  Anticipate next 24 -48 hours  Code Status: Prior      Subjective:   Patient reports that she feels fine today  She reports that she is frustrated that she cannot go home yet due to the hospital messing up her coumadin  She is wondering if she can be given any vit  K to increase the process  She currently denies any lightheadedness, dizziness, chest pain, shortness of breath, abdominal pain, nausea, vomiting  She denies any pain in her leg right now  Objective:     Vitals:   Temp (24hrs), Av 9 °F (36 6 °C), Min:97 8 °F (36 6 °C), Max:98 °F (36 7 °C)    HR:  [89-98] 89  Resp:  [18] 18  BP: ()/(64-81) 134/81  SpO2:  [95 %-96 %] 95 %  Body mass index is 31 48 kg/m²  Input and Output Summary (last 24 hours): Intake/Output Summary (Last 24 hours) at 18 1354  Last data filed at 18 0730   Gross per 24 hour   Intake              880 ml   Output                0 ml   Net              880 ml       Physical Exam:     Physical Exam   Constitutional: No distress  Patient is in no acute distress sitting in her hospital chair resting comfortably  PICC in place  RLE in boot  Neurovascularly intact   HENT:   Head: Normocephalic and atraumatic  Eyes: Conjunctivae are normal  No scleral icterus  Cardiovascular: Normal rate and intact distal pulses  Irregular rhythm    Pulmonary/Chest: Effort normal and breath sounds normal  No respiratory distress  She has no wheezes  She has no rales  Abdominal: Soft  Bowel sounds are normal  She exhibits no distension  There is no tenderness  There is no rebound  Musculoskeletal: She exhibits no edema  Neurological: She is alert  Skin: Skin is warm and dry  She is not diaphoretic   No erythema  Psychiatric: She has a normal mood and affect  Vitals reviewed  Additional Data:     Labs:      Results from last 7 days  Lab Units 08/06/18  0944   WBC Thousand/uL 6 09   HEMOGLOBIN g/dL 12 3   HEMATOCRIT % 38 4   PLATELETS Thousands/uL 326   NEUTROS PCT % 69   LYMPHS PCT % 14   MONOS PCT % 12   EOS PCT % 3       Results from last 7 days  Lab Units 08/06/18  0944 08/03/18  0458   SODIUM mmol/L 135* 135*   POTASSIUM mmol/L 4 1 4 3   CHLORIDE mmol/L 100 102   CO2 mmol/L 31 29   BUN mg/dL 15 14   CREATININE mg/dL 0 82 0 89   CALCIUM mg/dL 8 6 8 5   TOTAL PROTEIN g/dL  --  6 0*   BILIRUBIN TOTAL mg/dL  --  0 30   ALK PHOS U/L  --  60   ALT U/L  --  15   AST U/L  --  16   GLUCOSE RANDOM mg/dL 127 93       Results from last 7 days  Lab Units 08/09/18  0521   INR  3 69*       * I Have Reviewed All Lab Data Listed Above  * Additional Pertinent Lab Tests Reviewed:  All Labs Within Last 24 Hours Reviewed    Imaging:    Imaging Reports Reviewed Today Include: Xray chest PICC  Imaging Personally Reviewed by Myself Includes:  None    Recent Cultures (last 7 days):           Last 24 Hours Medication List:     Current Facility-Administered Medications:  acetaminophen 325 mg Oral Q6H PRN Sheila Valentine MD    amLODIPine 5 mg Oral Daily Sheila Valentine MD    aspirin 81 mg Oral Daily Sheila Valentine MD    atorvastatin 10 mg Oral Daily With Meseret Dunaway MD    cefazolin 2,000 mg Intravenous Q8H Anton Rizo MD Last Rate: 2,000 mg (08/09/18 1030)   furosemide 20 mg Oral Daily Sheila Valentine MD    HYDROmorphone 2 mg Oral Q4H PRN Sheila Valentine MD    levothyroxine 88 mcg Oral Early Morning Sheila Valentine MD    lisinopril 20 mg Oral Daily Sheila Valentine MD    metoprolol succinate 100 mg Oral Daily Sheila Valentine MD    multivitamin-minerals 1 tablet Oral Daily Sheila Valentine MD         Today, Patient Was Seen By: Riaz Junior PA-C    ** Please Note: Dictation voice to text software may have been used in the creation of this document   **

## 2018-08-09 NOTE — PROGRESS NOTES
Teaching and education done with patient regarding administration of IV antibiotic  Taught patient how to prime and prepare the antibiotic  Patient was attentive and was educated twice on administering the IV antibiotic on her own  Second time patient did about 90% of the administration and prep of all materials and PICC line  Patient has anxiety about forgetting the steps of giving antibiotic  The patient will receive a dose at 0300 and at 1100  Continue to allow patient to do all the work in preparing the IV just set her up with the supplies

## 2018-08-09 NOTE — ASSESSMENT & PLAN NOTE
· On a/c with Coumadin  · Patient is on a 1mg, 6 days a week, 2 mg 1 day a week schedule, was receiving a few days worth of 2 5 mg here     · Consulted for supratherapeutic INR    INR today mildly improved to 3 69 today   · Continue to hold Coumadin and recheck INR in the AM  · Needs to improve prior to discharge as patient is a fall risk at home, refusing STR

## 2018-08-10 ENCOUNTER — HOSPITAL ENCOUNTER (EMERGENCY)
Facility: HOSPITAL | Age: 83
Discharge: HOME | End: 2018-08-11
Attending: EMERGENCY MEDICINE | Admitting: EMERGENCY MEDICINE
Payer: MEDICARE

## 2018-08-10 VITALS
WEIGHT: 183.42 LBS | SYSTOLIC BLOOD PRESSURE: 132 MMHG | DIASTOLIC BLOOD PRESSURE: 82 MMHG | TEMPERATURE: 98.4 F | HEIGHT: 64 IN | BODY MASS INDEX: 31.31 KG/M2 | OXYGEN SATURATION: 93 % | RESPIRATION RATE: 18 BRPM | HEART RATE: 107 BPM

## 2018-08-10 DIAGNOSIS — T82.898A OCCLUDED PICC LINE, INITIAL ENCOUNTER (HCC): Primary | ICD-10-CM

## 2018-08-10 LAB
INR PPP: 2.99 (ref 0.86–1.17)
PROTHROMBIN TIME: 30.6 SECONDS (ref 11.8–14.2)

## 2018-08-10 PROCEDURE — 85610 PROTHROMBIN TIME: CPT | Performed by: PHYSICIAN ASSISTANT

## 2018-08-10 PROCEDURE — 99232 SBSQ HOSP IP/OBS MODERATE 35: CPT | Performed by: INTERNAL MEDICINE

## 2018-08-10 PROCEDURE — 99232 SBSQ HOSP IP/OBS MODERATE 35: CPT | Performed by: NURSE PRACTITIONER

## 2018-08-10 PROCEDURE — 99024 POSTOP FOLLOW-UP VISIT: CPT | Performed by: PHYSICIAN ASSISTANT

## 2018-08-10 RX ADMIN — FUROSEMIDE 20 MG: 20 TABLET ORAL at 10:29

## 2018-08-10 RX ADMIN — ASPIRIN 81 MG 81 MG: 81 TABLET ORAL at 10:28

## 2018-08-10 RX ADMIN — LISINOPRIL 20 MG: 20 TABLET ORAL at 10:28

## 2018-08-10 RX ADMIN — CEFAZOLIN SODIUM 2000 MG: 2 SOLUTION INTRAVENOUS at 14:12

## 2018-08-10 RX ADMIN — Medication 1 TABLET: at 14:06

## 2018-08-10 RX ADMIN — AMLODIPINE BESYLATE 5 MG: 5 TABLET ORAL at 10:28

## 2018-08-10 RX ADMIN — CEFAZOLIN SODIUM 2000 MG: 2 SOLUTION INTRAVENOUS at 03:36

## 2018-08-10 RX ADMIN — METOPROLOL SUCCINATE 100 MG: 100 TABLET, EXTENDED RELEASE ORAL at 10:29

## 2018-08-10 RX ADMIN — LEVOTHYROXINE SODIUM 88 MCG: 88 TABLET ORAL at 05:13

## 2018-08-10 NOTE — PROGRESS NOTES
Orthopedics   Ignacia Purvis [de-identified] y o  female MRN: [id number]  Unit/Bed#: -01    Subjective:  [de-identified] y  o female post operative day 8 status post right ankle removal hardware distal fibula and I&D  Pt doing well  Pain controlled  Patient has no complaints of numbness  States she has no complaints of pain  Patient here to be discharged to home    Patient has PICC line and receiving antibiotics per Infectious Disease    Labs:    0  Lab Value Date/Time   HCT 38 4 08/06/2018 0944   HCT 41 3 07/30/2018 1719   HCT 33 1 (L) 03/26/2018 0501   HGB 12 3 08/06/2018 0944   HGB 13 6 07/30/2018 1719   HGB 10 3 (L) 03/26/2018 0501   INR 2 99 (H) 08/10/2018 0513   WBC 6 09 08/06/2018 0944   WBC 11 27 (H) 07/30/2018 1719   WBC 7 62 03/26/2018 0501   ESR 21 (H) 07/30/2018 1719   CRP >90 0 (H) 07/30/2018 1719       Meds:    Current Facility-Administered Medications:     acetaminophen (TYLENOL) tablet 325 mg, 325 mg, Oral, Q6H PRN, Halina Cool MD, 325 mg at 08/07/18 1739    amLODIPine (NORVASC) tablet 5 mg, 5 mg, Oral, Daily, Halina Cool MD, 5 mg at 08/10/18 1028    aspirin chewable tablet 81 mg, 81 mg, Oral, Daily, Halina Cool MD, 81 mg at 08/10/18 1028    atorvastatin (LIPITOR) tablet 10 mg, 10 mg, Oral, Daily With Nikki Armstrong MD, 10 mg at 08/09/18 1601    ceFAZolin (ANCEF) IVPB (premix) 2,000 mg, 2,000 mg, Intravenous, Q8H, ORLIN Mart    furosemide (LASIX) tablet 20 mg, 20 mg, Oral, Daily, Halina Cool MD, 20 mg at 08/10/18 1029    HYDROmorphone (DILAUDID) tablet 2 mg, 2 mg, Oral, Q4H PRN, Halina Cool MD, 2 mg at 08/06/18 2024    levothyroxine tablet 88 mcg, 88 mcg, Oral, Early Morning, Chucho Caputo MD, 88 mcg at 08/10/18 0513    lisinopril (ZESTRIL) tablet 20 mg, 20 mg, Oral, Daily, Halina Cool MD, 20 mg at 08/10/18 1028    metoprolol succinate (TOPROL-XL) 24 hr tablet 100 mg, 100 mg, Oral, Daily, Halina Cool MD, 100 mg at 08/10/18 1029   multivitamin-minerals (CENTRUM) tablet 1 tablet, 1 tablet, Oral, Daily, Jason Rasmussen MD    Blood Culture:   No results found for: BLOODCX    Wound Culture:   No results found for: WOUNDCULT    Ins and Outs:  I/O last 24 hours: In: 840 [P O :840]  Out: 450 [Urine:450]          Physical:  Vitals:    08/10/18 1100   BP: 132/82   Pulse: (!) 107   Resp: 18   Temp: 98 4 °F (36 9 °C)   SpO2: 93%     right lower extremityORIF  · Dressings clean Dry Intact, incision looks clean dry and intact with sutures with no erythema no signs of infection  · Patient able to flex and extend right knee, flex and extend ankle and able to flex and extend all toes  · Sensation intact L1-S1  · +2 DP Pulse    _*_*_*_*_*_*_*_*_*_*_*_*_*_*_*_*_*_*_*_*_*_*_*_*_*_*_*_*_*_*_*_*_*_*_*_*_*_*_*_*_*    Assessment:  Patient is status post removal distal fibula hardware and I and D right ankle  culture did return with 3+ growth of Staphylococcus aureus  Infectious Disease suspects right ankle osteomyelitis and recommends long-term IV antibiotic treatment  Plan:  · Partial weight-bearing through the heel right lower extremity   · Up out of bed  · Ice, Elevation to affected extremity  · Analgesics  · Appreciate Infectious Disease recommendations  Patient will be discharged with PICC line to continue IV antibiotics  Patient has already been taught how to do this on her own    · Dispo: Dinora Valentine for discharge from ortho perspective  · Will continue to assess for acute blood loss anemia   · Will follow up in the office 10 days with Dr Jason Leigh PA-C

## 2018-08-10 NOTE — CASE MANAGEMENT
Continued Stay Review    Date: 8/10    Vital Signs: /82 (BP Location: Left arm)   Pulse (!) 107   Temp 98 4 °F (36 9 °C) (Oral)   Resp 18   Ht 5' 4" (1 626 m)   Wt 83 2 kg (183 lb 6 8 oz)   SpO2 93%   BMI 31 48 kg/m²     Medications:   Scheduled Meds:   Current Facility-Administered Medications:  acetaminophen 325 mg Oral Q6H PRN Sheila Valentine MD   amLODIPine 5 mg Oral Daily Sheila Valentine MD   aspirin 81 mg Oral Daily Sheila Valentine MD   atorvastatin 10 mg Oral Daily With Meseret Blocker, MD   cefazolin 2,000 mg Intravenous Q8H ORLIN Mart   furosemide 20 mg Oral Daily Sheila Valentine MD   HYDROmorphone 2 mg Oral Q4H PRN Sheila Valentine MD   levothyroxine 88 mcg Oral Early Morning Sheila Valentine MD   lisinopril 20 mg Oral Daily Sheila Valentine MD   metoprolol succinate 100 mg Oral Daily Sheila Valentine MD   multivitamin-minerals 1 tablet Oral Daily Sheila Valentine MD     Continuous Infusions:    PRN Meds:   acetaminophen    HYDROmorphone    Abnormal Labs/Diagnostic Results: PT NR   30 6  2 99    Age/Sex: [de-identified] y o  female     Assessment/Plan:   Note from  8/9  Status post ORIF of fracture of ankle   Assessment & Plan     · Concern for right ankle osteomyelitis from hardware infection   · Patient underwent hardware removal on 8/2  · ID following,  ? PICC line placed for IV antibiotics  · Continue IV cefazolin for a total treatment of 4 weeks total, through 9/3  · Orthopedics primary,  ? Continue dressing changes daily  ? PT recommending home therapy vs  Family support  ? Pt to follow up with dr Migue Quezada in 12 days           Atrial fibrillation [facility])   Assessment & Plan     · On a/c with Coumadin  · Patient is on a 1mg, 6 days a week, 2 mg 1 day a week schedule, was receiving a few days worth of 2 5 mg here  Reasoning unknown  · Consulted for supratherapeutic INR    INR today mildly improved to 3 69  · Continue to hold Coumadin and recheck INR in the AM  · Needs to improve prior to discharge as patient is a fall risk at home, refusing STR             VTE Pharmacologic Prophylaxis:   Pharmacologic: Warfarin (Coumadin) on hold due to supratherapeutic INR  Mechanical VTE Prophylaxis in Place: Yes     Patient Centered Rounds: I have evaluated patient without nursing staff present due to speaking to nurse outside patient's room     Discussions with Specialists or Other Care Team Provider: Discussed with orthopedics, RN, CM and reviewed previous notes       Education and Discussions with Family / Patient: Discussed with patient at bedside, when asked to update friends or family members patient politely declined     Time Spent for Care: 20 minutes  More than 50% of total time spent on counseling and coordination of care as described above      Current Length of Stay: 7 day(s)     Current Patient Status: Inpatient   Certification Statement: The patient will continue to require additional inpatient hospital stay due to Awaiting therapeutic INR, patient is still supratherapeutic and a fall risk at home     Discharge Plan: Pt refusing STR, awaiting for INR to be stabilized  Per orthopedic surgery recs   Anticipate next 24 -48 hours       Code Status: Prior   Discharge Plan: TBD     Per PT note  - DC home today 8/10

## 2018-08-10 NOTE — ASSESSMENT & PLAN NOTE
· On a/c with Coumadin  · Patient is on a 1mg, 6 days a week, 2 mg 1 day a week schedule, was receiving a few days worth of 2 5 mg here     · Consulted for supratherapeutic INR    INR 2 99 today  · Patient instructed to take 1 mg of Coumadin Friday, Saturday, Sunday evening and follow up Monday a m  at 8:10 a m  with her usual anticoagulation clinic

## 2018-08-10 NOTE — PHYSICAL THERAPY NOTE
Physical Therapy Cancellation Note    Chart review performed  Cleared by Lor Desouza for PT session   At this time, PT treatment session cancelled per pt request, pt reporting "I'm going home today, I don't want to do any therapy now "     Lorraine Calvo, PT, DPT

## 2018-08-10 NOTE — PROGRESS NOTES
Spoke to MD regarding a time change for IVABT to 0600, 1400, and 2200 so pt doesn't have to administer in the middle of the night

## 2018-08-10 NOTE — PROGRESS NOTES
Progress Note - Infectious Disease   Lulu Jordan [de-identified] y o  female MRN: [id number]  Unit/Bed#: -01 Encounter: [id number]      Impression/Recommendations: 1    Suspect right ankle osteomyelitis   Although no obvious osteomyelitis was seen at surgery/hardware extraction, probability of osteomyelitis is extremely high, due to infected hardware   Will treat patient with long-term IV antibiotic  Continue high-dose IV cefazolin  Treat x4 weeks total, another 23 days, through 9/3  Check weekly CBCD/BMP while on IV antibiotic      2  Right ankle hardware infection   Patient is now status post hardware extraction   She still needs long-term IV antibiotic, as in above   However, with no further hardware, she will not need antibiotic suppression afterwards  Antibiotic plan as in above      3   History of right ankle fracture with ORIF in 2018   Apparently, fracture has healed      Discussed with patient in detail regarding above plan  Okay for discharge from ID viewpoint  Follow up with us 2 weeks after discharge        Antibiotics:  Cefazolin # 5     Subjective:  Right ankle without pain  Temperature stays down   No chills  Patient is tolerating antibiotic well   No nausea, vomiting or diarrhea  Objective:  Vitals:  HR:  [] 107  Resp:  [18] 18  BP: (104-138)/(56-85) 132/82  SpO2:  [91 %-94 %] 93 %  Temp (24hrs), Av 2 °F (36 8 °C), Min:97 7 °F (36 5 °C), Max:98 4 °F (36 9 °C)  Current: Temperature: 98 4 °F (36 9 °C)    Physical Exam:     General: Awake, alert, cooperative, no distress  Neck:  Supple  No mass  No lymphadenopathy  Lungs: Expansion symmetric, no rales, no wheezing, respirations unlabored  Heart:  Tachycardic with regular rhythm, S1 and S2 normal, no murmur  Abdomen: Soft, nondistended, non-tender, bowel sounds active all four quadrants,        no masses, no organomegaly  Extremities: Right ankle with dressing in place  Dressing is dry  No tenderness  No erythema  Minimal edema  Skin:  No rash  Neuro: Moves all extremities  Invasive Devices     Peripherally Inserted Central Catheter Line            PICC Line 08/07/18 Right Brachial 2 days                Labs studies:   I have personally reviewed pertinent labs  Results from last 7 days  Lab Units 08/06/18  0944   SODIUM mmol/L 135*   POTASSIUM mmol/L 4 1   CHLORIDE mmol/L 100   CO2 mmol/L 31   ANION GAP mmol/L 4   BUN mg/dL 15   CREATININE mg/dL 0 82   EGFR ml/min/1 73sq m 68   GLUCOSE RANDOM mg/dL 127   CALCIUM mg/dL 8 6       Results from last 7 days  Lab Units 08/06/18  0944   WBC Thousand/uL 6 09   HEMOGLOBIN g/dL 12 3   PLATELETS Thousands/uL 326           Imaging Studies:   I have personally reviewed pertinent imaging study reports and images in PACS  EKG, Pathology, and Other Studies:   I have personally reviewed pertinent reports

## 2018-08-10 NOTE — DISCHARGE INSTRUCTIONS
Discharge Instructions - Orthopedics  Manny Yanez [de-identified] y o  female MRN: [id number]  Unit/Bed#: -01    Weight Bearing Status:                                           Partial weight-bearing through heel right lower extremity    DVT prophylaxis:  Patient will resume her Coumadin as prescribed    Pain:  Continue analgesics as directed    Dressing Instructions:   Keep dressing clean, dry and intact until follow up appointment  Appt Instructions: If you do not have your appointment, please call the clinic at [phone] to f/u with Dr Geno Canas the office sooner if you experience any increased numbness/tingling in the extremities

## 2018-08-10 NOTE — PROGRESS NOTES
[facility]  Progress Note - Bartolo Agapito 1935, [de-identified] y o  female MRN: [id number]    Unit/Bed#: -[facility] Encounter: [id number]    Primary Care Provider: Jair Salas DO   Date and time admitted to hospital: 8/2/2018 11:12 AM        Atrial fibrillation ([address] )   Assessment & Plan    · On a/c with Coumadin  · Patient is on a 1mg, 6 days a week, 2 mg 1 day a week schedule, was receiving a few days worth of 2 5 mg here     · Consulted for supratherapeutic INR  INR 2 99 today  · Patient instructed to take 1 mg of Coumadin Friday, Saturday, Sunday evening and follow up Monday a m  at 8:10 a m  with her usual anticoagulation clinic        * Status post ORIF of fracture of ankle   Assessment & Plan    · Concern for right ankle osteomyelitis from hardware infection   · Patient underwent hardware removal on 8/2  · ID following,  · PICC line placed for IV antibiotics  · Continue IV cefazolin for a total treatment of 4 weeks total, through 9/3  · Orthopedics primary,  · Continue dressing changes daily  · PT recommending home therapy vs  Family support  · Pt to follow up with dr Minnie Workman in 12 days              VTE Pharmacologic Prophylaxis:   Pharmacologic: Warfarin (Coumadin)  Mechanical VTE Prophylaxis in Place: Yes    Patient Centered Rounds: I have performed bedside rounds with nursing staff today  Discussions with Specialists or Other Care Team Provider:  Discussed with primary orthopedic team, discussed with primary RN, discussed with case management    Education and Discussions with Family / Patient:  Patient is on the phone with her sister at this time    Time Spent for Care: 20 minutes  More than 50% of total time spent on counseling and coordination of care as described above      Current Length of Stay: 8 day(s)    Current Patient Status: Inpatient   Certification Statement: The patient will continue to require additional inpatient hospital stay due to Patient completing her to o'clock antibiotics and then returning home    Discharge Plan / Estimated Discharge Date:  Today      Code Status: Prior      Subjective:   Patient she states feels much better, agreeable and understanding her Coumadin planned when to follow up with her anticoagulation clinic  Patient is comfortable administering IV antibiotics to herself  She standing to receive her 2 o'clock IV dose and then she will go home  She understands that the home nurse will be out to see her this afternoon teacher how to give her antibiotics at home and then knows that she needs to administer her next dose at 10:00 p m  Home start is delivering her IV antibiotics to her hospital room at 12:30 p m  Objective:     Vitals:   Temp (24hrs), Av 2 °F (36 8 °C), Min:97 7 °F (36 5 °C), Max:98 4 °F (36 9 °C)    HR:  [] 107  Resp:  [18] 18  BP: (104-138)/(56-85) 132/82  SpO2:  [91 %-94 %] 93 %  Body mass index is 31 48 kg/m²  Input and Output Summary (last 24 hours): Intake/Output Summary (Last 24 hours) at 08/10/18 1223  Last data filed at 08/10/18 0716   Gross per 24 hour   Intake              160 ml   Output              250 ml   Net              -90 ml       Physical Exam:     Physical Exam   Constitutional: She is oriented to person, place, and time  She appears well-developed and well-nourished  HENT:   Head: Normocephalic  Neck: Normal range of motion  Cardiovascular: Normal rate  Pulmonary/Chest: Effort normal and breath sounds normal    Abdominal: Soft  Bowel sounds are normal    Musculoskeletal: She exhibits tenderness  Neurological: She is alert and oriented to person, place, and time  Skin: Skin is warm and dry  Psychiatric: She has a normal mood and affect   Her behavior is normal  Thought content normal        Additional Data:     Labs:      Results from last 7 days  Lab Units 18  0944   WBC Thousand/uL 6 09   HEMOGLOBIN g/dL 12 3   HEMATOCRIT % 38 4   PLATELETS Thousands/uL 326   NEUTROS PCT % 69   LYMPHS PCT % 14   MONOS PCT % 12   EOS PCT % 3       Results from last 7 days  Lab Units 08/06/18  0944   SODIUM mmol/L 135*   POTASSIUM mmol/L 4 1   CHLORIDE mmol/L 100   CO2 mmol/L 31   BUN mg/dL 15   CREATININE mg/dL 0 82   CALCIUM mg/dL 8 6   GLUCOSE RANDOM mg/dL 127       Results from last 7 days  Lab Units 08/10/18  0513   INR  2 99*       * I Have Reviewed All Lab Data Listed Above  * Additional Pertinent Lab Tests Reviewed: [facility]      Recent Cultures (last 7 days):           Last 24 Hours Medication List:     Current Facility-Administered Medications:  acetaminophen 325 mg Oral Q6H PRN Sofi Jonas MD   amLODIPine 5 mg Oral Daily Sofi Jonas MD   aspirin 81 mg Oral Daily Sofi Jonas MD   atorvastatin 10 mg Oral Daily With April Reyes MD   cefazolin 2,000 mg Intravenous Q8H ORLIN Mart   furosemide 20 mg Oral Daily Sofi Jonas MD   HYDROmorphone 2 mg Oral Q4H PRN Sofi Jonas MD   levothyroxine 88 mcg Oral Early Morning Sofi Jonas MD   lisinopril 20 mg Oral Daily Sofi Jonas MD   metoprolol succinate 100 mg Oral Daily Sofi Jonas MD   multivitamin-minerals 1 tablet Oral Daily Sofi Jonas MD        Today, Patient Was Seen By: ORLIN Patel    ** Please Note: Dragon 360 Dictation voice to text software may have been used in the creation of this document   **

## 2018-08-10 NOTE — ASSESSMENT & PLAN NOTE
· Concern for right ankle osteomyelitis from hardware infection   · Patient underwent hardware removal on 8/2  · ID following,  · PICC line placed for IV antibiotics  · Continue IV cefazolin for a total treatment of 4 weeks total, through 9/3  · Orthopedics primary,  · Continue dressing changes daily  · PT recommending home therapy vs  Family support  · Pt to follow up with dr Kapil John in 12 days

## 2018-08-10 NOTE — PROGRESS NOTES
Contacted MD for update of AVS  Orders updated for discharge to Warfarin 1 mg daily at bedtime and reviewed with pt  [facility] contacted via telephone and updated of medication change

## 2018-08-11 VITALS
BODY MASS INDEX: 31.43 KG/M2 | WEIGHT: 184.08 LBS | OXYGEN SATURATION: 96 % | TEMPERATURE: 98.7 F | RESPIRATION RATE: 18 BRPM | HEART RATE: 104 BPM | SYSTOLIC BLOOD PRESSURE: 158 MMHG | HEIGHT: 64 IN | DIASTOLIC BLOOD PRESSURE: 105 MMHG

## 2018-08-11 PROCEDURE — 96365 THER/PROPH/DIAG IV INF INIT: CPT

## 2018-08-11 PROCEDURE — 99283 EMERGENCY DEPT VISIT LOW MDM: CPT

## 2018-08-11 RX ADMIN — CEFAZOLIN SODIUM 2000 MG: 2 SOLUTION INTRAVENOUS at 01:58

## 2018-08-11 RX ADMIN — HEPARIN 300 UNITS: 100 SYRINGE at 03:09

## 2018-08-12 NOTE — ED PROVIDER NOTES
History  Chief Complaint   Patient presents with    Vascular Access Problem     Pt  states she was trying to run an antibiotic tonight and noticed her picc line was blocked, per her doctors office they told her to come get checked out     63-year-old female patient presents emergency department for evaluation of PICC line malfunction  The patient is on home antibiotics for a post surgical infection of her ankle which had plates removed  Currently, the patient states no other symptoms will evaluate patient's PICC line  The patient had the PICC line flushed, dose of antibiotics she had missed earlier in the day was given to her here  The patient still was asymptomatic at the time of discharge discharged home to continue her IV antibiotics  I did discuss with her some strategies as far as keeping the PICC line patch and patent and I advised her that if the PICC line fails to maintain its patency she could come to the emergency department for IV establishment, dose of antibiotics, and we could have her set up again for PICC line placement as an outpatient  History provided by:  Patient   used: No    Medical Problem   Location:  PICC line issue, left arm  Severity:  Mild  Onset quality:  Sudden  Timing:  Constant  Progression:  Unchanged  Chronicity:  New  Associated symptoms: no chest pain, no congestion, no diarrhea, no ear pain, no fever, no headaches, no loss of consciousness, no rhinorrhea and no sore throat        Prior to Admission Medications   Prescriptions Last Dose Informant Patient Reported? Taking?    Multiple Vitamins-Minerals (CERTAVITE SENIOR/ANTIOXIDANT PO)  Self Yes Yes   Sig: Take by mouth   acetaminophen (TYLENOL) 325 mg tablet  Self Yes Yes   Sig: Take 325 mg by mouth every 6 (six) hours as needed for mild pain   amLODIPine (NORVASC) 5 mg tablet  Self Yes Yes   Sig: Take 5 mg by mouth   aspirin 81 MG tablet  Self Yes Yes   Sig: Take by mouth   atorvastatin (LIPITOR) 10 mg tablet  Self Yes Yes   Sig: Take 10 mg by mouth   benazepril (LOTENSIN) 20 mg tablet  Self Yes Yes   Sig: Take 20 mg by mouth daily   ceFAZolin (ANCEF) 2000 mg IVPB   No Yes   Sig: Infuse 2,000 mg into a venous catheter every 8 (eight) hours for 26 days   furosemide (LASIX) 20 mg tablet  Self Yes Yes   Sig: Take 20 mg by mouth daily   levothyroxine 88 mcg tablet  Self Yes Yes   Sig: Take 88 mcg by mouth   metoprolol succinate (TOPROL-XL) 100 mg 24 hr tablet  Self Yes Yes   Sig: Take 100 mg by mouth   warfarin (COUMADIN) 2 5 mg tablet  Self No Yes   Sig: Take 2 5 mg daily at dinner until changed by your physician adjust your dose   Patient taking differently: Take 1 mg by mouth daily Take 1 mg daily at dinner until changed by your physician adjust your dose       Facility-Administered Medications: None       Past Medical History:   Diagnosis Date    Atrial fibrillation ([address] )     Disease of thyroid gland     Hyperlipidemia     Hypertension        Past Surgical History:   Procedure Laterality Date    ORIF TIBIA & FIBULA FRACTURES Right 3/12/2018    Procedure: OPEN REDUCTION W/ INTERNAL FIXATION (ORIF) ANKLE  Open reduction internal fixation lateral malleolus with possible fixation of medial malleolus;  Surgeon: Ramses Barnes MD;  Location: [facility];  Service: Orthopedics    AL I&D SOFT TISSUE ABSCESS SUBFASCIAL Right 8/2/2018    Procedure: Right ankle incision and drainage;  Surgeon: Ramses Barnes MD;  Location: [facility];  Service: Orthopedics    AL REMOVAL DEEP IMPLANT Right 8/2/2018    Procedure: REMOVAL HARDWARE ANKLE;  Surgeon: Ramses Barnes MD;  [facility]: [facility];  Service: Orthopedics    TOTAL THYROIDECTOMY         Family History   Problem Relation Age of Onset    Heart disease Father      I have reviewed and agree with the history as documented      Social History   Substance Use Topics    Smoking status: Never Smoker    Smokeless tobacco: Never Used    Alcohol use No Review of Systems   Constitutional: Negative for fever  HENT: Negative for congestion, ear pain, rhinorrhea and sore throat  Cardiovascular: Negative for chest pain  Gastrointestinal: Negative for diarrhea  Neurological: Negative for loss of consciousness and headaches  All other systems reviewed and are negative  Physical Exam  Physical Exam   Constitutional: She is oriented to person, place, and time  She appears well-developed and well-nourished  HENT:   Head: Normocephalic and atraumatic  Right Ear: External ear normal    Left Ear: External ear normal    Eyes: Conjunctivae and EOM are normal    Neck: No JVD present  No tracheal deviation present  No thyromegaly present  Cardiovascular: Normal rate  Pulmonary/Chest: Effort normal and breath sounds normal  No stridor  Abdominal: Soft  She exhibits no distension and no mass  There is no tenderness  There is no guarding  No hernia  Musculoskeletal: Normal range of motion  She exhibits no edema, tenderness or deformity  Lymphadenopathy:     She has no cervical adenopathy  Neurological: She is alert and oriented to person, place, and time  Skin: Skin is warm  No rash noted  No erythema  No pallor  Psychiatric: She has a normal mood and affect  Her behavior is normal    Nursing note and vitals reviewed        Vital Signs  ED Triage Vitals   Temperature Pulse Respirations Blood Pressure SpO2   08/11/18 0008 08/11/18 0003 08/11/18 0003 08/11/18 0003 08/11/18 0003   98 7 °F (37 1 °C) 104 18 (!) 158/105 96 %      Temp Source Heart Rate Source Patient Position - Orthostatic VS BP Location FiO2 (%)   08/11/18 0003 08/11/18 0003 08/11/18 0003 08/11/18 0003 --   Oral Monitor Sitting Left arm       Pain Score       --                  Vitals:    08/11/18 0003   BP: (!) 158/105   Pulse: 104   Patient Position - Orthostatic VS: Sitting       Visual Acuity      ED Medications  Medications   ceFAZolin (ANCEF) IVPB (premix) 2,000 mg (0 mg Intravenous Stopped 8/11/18 0222)   heparin lock flush 100 units/mL injection 300 Units (300 Units Intracatheter Given 8/11/18 0309)       Diagnostic Studies  Results Reviewed     None                 No orders to display              Procedures  Procedures       Phone Contacts  ED Phone Contact    ED Course           Identification of Seniors at Risk      Most Recent Value   (ISAR) Identification of Seniors at Risk   Before the illness or injury that brought you to the Emergency, did you need someone to help you on a regular basis? 1 Filed at: 08/11/2018 0006   In the last 24 hours, have you needed more help than usual?  0 Filed at: 08/11/2018 0006   Have you been hospitalized for one or more nights during the past 6 months? 1 Filed at: 08/11/2018 0006   In general, do you see well?  0 Filed at: 08/11/2018 0006   In general, do you have serious problems with your memory? 0 Filed at: 08/11/2018 0006   Do you take more than three different medications every day? 1 Filed at: 08/11/2018 0006   ISAR Score  3 Filed at: 08/11/2018 0006                          MDM  Number of Diagnoses or Management Options  Occluded PICC line, initial encounter [facility]): new and requires workup     Amount and/or Complexity of Data Reviewed  Decide to obtain previous medical records or to obtain history from someone other than the patient: yes  Review and summarize past medical records: yes    Patient Progress  Patient progress: stable    CritCare Time    Disposition  Final diagnoses:   Occluded PICC line, initial encounter [facility])     Time reflects when diagnosis was documented in both MDM as applicable and the Disposition within this note     Time User Action Codes Description Comment    8/11/2018  3:02 AM Brandon Fitzpatrick Add [L41 041P] Occluded PICC line, initial encounter [facility])       ED Disposition     ED Disposition Condition Comment    Discharge  [place] discharge to home/self care      Condition at discharge: Stable Follow-up Information     Follow up With Specialties Details [facility],  Family Medicine   [address] [address] [facility] [address]  [phone]            Discharge Medication List as of 8/11/2018  3:03 AM      CONTINUE these medications which have NOT CHANGED    Details   acetaminophen (TYLENOL) 325 mg tablet Take 325 mg by mouth every 6 (six) hours as needed for mild pain, Historical Med      amLODIPine (NORVASC) 5 mg tablet Take 5 mg by mouth, Starting Fri 5/19/2017, Historical Med      aspirin 81 MG tablet Take by mouth, Starting Tue 7/13/2004, Historical Med      atorvastatin (LIPITOR) 10 mg tablet Take 10 mg by mouth, Starting Fri 5/19/2017, Historical Med      benazepril (LOTENSIN) 20 mg tablet Take 20 mg by mouth daily, Historical Med      ceFAZolin (ANCEF) 2000 mg IVPB Infuse 2,000 mg into a venous catheter every 8 (eight) hours for 26 days, Starting Wed 8/8/2018, Until Mon 9/3/2018, Print      furosemide (LASIX) 20 mg tablet Take 20 mg by mouth daily, Historical Med      levothyroxine 88 mcg tablet Take 88 mcg by mouth, Starting Fri 5/19/2017, Historical Med      metoprolol succinate (TOPROL-XL) 100 mg 24 hr tablet Take 100 mg by mouth, Starting Fri 5/19/2017, Historical Med      Multiple Vitamins-Minerals (CERTAVITE SENIOR/ANTIOXIDANT PO) Take by mouth, Historical Med      warfarin (COUMADIN) 2 5 mg tablet Take 2 5 mg daily at dinner until changed by your physician adjust your dose, Print           No discharge procedures on file      ED Provider  Electronically Signed by           Elciia Anderson DO  08/11/18 7922

## 2018-08-13 ENCOUNTER — TELEPHONE (OUTPATIENT)
Dept: INFECTIOUS DISEASES | Facility: CLINIC | Age: 83
End: 2018-08-13

## 2018-08-13 NOTE — TELEPHONE ENCOUNTER
Spoke to [facility], and sent orders over to them for weekly cbcd and bmp   Fax #[phone]

## 2018-08-29 DIAGNOSIS — Z87.81 STATUS POST ORIF OF FRACTURE OF ANKLE: Primary | ICD-10-CM

## 2018-08-29 DIAGNOSIS — Z98.890 STATUS POST ORIF OF FRACTURE OF ANKLE: Primary | ICD-10-CM

## 2018-08-31 ENCOUNTER — APPOINTMENT (OUTPATIENT)
Dept: RADIOLOGY | Facility: CLINIC | Age: 83
End: 2018-08-31
Payer: MEDICARE

## 2018-08-31 ENCOUNTER — OFFICE VISIT (OUTPATIENT)
Dept: OBGYN CLINIC | Facility: CLINIC | Age: 83
End: 2018-08-31

## 2018-08-31 VITALS — WEIGHT: 182 LBS | BODY MASS INDEX: 31.07 KG/M2 | HEIGHT: 64 IN

## 2018-08-31 DIAGNOSIS — Z98.890 STATUS POST INCISION AND DRAINAGE: Primary | ICD-10-CM

## 2018-08-31 DIAGNOSIS — Z87.81 STATUS POST ORIF OF FRACTURE OF ANKLE: ICD-10-CM

## 2018-08-31 DIAGNOSIS — T84.7XXD INFECTED HARDWARE IN RIGHT LOWER EXTREMITY, SUBSEQUENT ENCOUNTER: ICD-10-CM

## 2018-08-31 DIAGNOSIS — Z98.890 STATUS POST ORIF OF FRACTURE OF ANKLE: ICD-10-CM

## 2018-08-31 DIAGNOSIS — Z48.89 AFTERCARE FOLLOWING SURGERY: ICD-10-CM

## 2018-08-31 PROCEDURE — 99024 POSTOP FOLLOW-UP VISIT: CPT | Performed by: ORTHOPAEDIC SURGERY

## 2018-08-31 PROCEDURE — 73610 X-RAY EXAM OF ANKLE: CPT

## 2018-08-31 RX ORDER — WARFARIN SODIUM 2 MG/1
TABLET ORAL
COMMUNITY

## 2018-08-31 RX ORDER — WARFARIN SODIUM 1 MG/1
1 TABLET ORAL DAILY
COMMUNITY

## 2018-08-31 NOTE — PROGRESS NOTES
CHIEF COMPLAINT:   Chief Complaint   Patient presents with    Right Ankle - Follow-up         PROCEDURE: S/P Right ankle removal fibula hardware and I and D 8/2/2018      SUBJECTIVE: Debara Harada is a [de-identified] y o  female is here for follow up  She has been in ace wrap and been partial weight bearing  Patient still has PICC line placed and receiving antibiotics until next week  No complaints of pain today  Denies any numbness or tingling lower extremity  ROS:   General: no fever, no chills  Respiratory:  No coughing, shortness of breath or wheezing  Cardiovascular:  No chest pain, no palpitations  Neurological:  No headaches, no confusion  Review of all other systems is negative    MEDS:   Current Outpatient Prescriptions   Medication Sig Dispense Refill    acetaminophen (TYLENOL) 325 mg tablet Take 325 mg by mouth every 6 (six) hours as needed for mild pain      amLODIPine (NORVASC) 5 mg tablet Take 5 mg by mouth      aspirin 81 MG tablet Take by mouth      atorvastatin (LIPITOR) 10 mg tablet Take 10 mg by mouth      benazepril (LOTENSIN) 20 mg tablet Take 20 mg by mouth daily      ceFAZolin (ANCEF) 2000 mg IVPB Infuse into a venous catheter      furosemide (LASIX) 20 mg tablet Take 20 mg by mouth daily      levothyroxine 88 mcg tablet Take 88 mcg by mouth      metoprolol succinate (TOPROL-XL) 100 mg 24 hr tablet Take 100 mg by mouth      Multiple Vitamins-Minerals (CERTAVITE SENIOR/ANTIOXIDANT PO) Take by mouth      warfarin (COUMADIN) 1 mg tablet Take 1 mg by mouth daily      warfarin (COUMADIN) 2 mg tablet Take by mouth daily       No current facility-administered medications for this visit  ALLERGIES: Patient has no known allergies        Vitals:    08/31/18 0905   Weight: 82 6 kg (182 lb)   Height: 5' 4" (1 626 m)       PHYSICAL EXAM:    General Appearance:  Alert, cooperative, no distress, appears stated age   Lungs:   respirations unlabored   Chest Wall:  No tenderness or deformity Heart:  Normal Heart rate noted   Extremities: Extremities normal, atraumatic, no cyanosis or edema   Pulses: 2+ and symmetric   Neurologic: Normal     ORTHO EXAM:  Examination right ankle shows well healed incision on medial aspect ankle  Lateral aspect shows incision that is clean dry and intact with sutures  No active drainage  No erythema  The superior sutures were removed leaving the last three inferior sutures  Eschar noted over the distal aspect of the wound  Sensation intact to light touch L1-S1 distributions  IMAGING: X-rays 8/31/2018 right ankle shows orthopedic hardware noted medial aspect in acceptable position with no evidence of loosening or fracture  ASSESSMENT/PLAN:   S/p 4 weeks above procedure  Clarisse Haddad was seen today for follow-up  Diagnoses and all orders for this visit:    Status post incision and drainage    Infected hardware in right lower extremity, subsequent encounter    Status post ORIF of fracture of ankle  -     XR ankle 3+ vw right; Future        There are no Patient Instructions on file for this visit  DISCUSSION SUMMARY:  Patient is S/P 4 weeks removal hardware lateral ankle and incision and drainage  Culture in the hospital did grow 3+ growth Staphylococcus aureus; Infectious Disease did place PICC line while in the hospital    Her IV antibiotics will be completed next week  She will continue to follow up with infectious disease  She will now be weight bearing as tolerated  Removed the superior sutures leaving the last 3 distal sutures in place  Keep incision clean and dry   Will follow up in 2 weeks for wound check and possible removal of remaining sutures

## 2018-09-14 ENCOUNTER — OFFICE VISIT (OUTPATIENT)
Dept: OBGYN CLINIC | Facility: CLINIC | Age: 83
End: 2018-09-14

## 2018-09-14 VITALS
HEART RATE: 92 BPM | HEIGHT: 64 IN | SYSTOLIC BLOOD PRESSURE: 118 MMHG | DIASTOLIC BLOOD PRESSURE: 72 MMHG | WEIGHT: 182 LBS | BODY MASS INDEX: 31.07 KG/M2

## 2018-09-14 DIAGNOSIS — Z87.81 STATUS POST ORIF OF FRACTURE OF ANKLE: Primary | ICD-10-CM

## 2018-09-14 DIAGNOSIS — Z98.890 STATUS POST ORIF OF FRACTURE OF ANKLE: Primary | ICD-10-CM

## 2018-09-14 PROCEDURE — 99024 POSTOP FOLLOW-UP VISIT: CPT | Performed by: ORTHOPAEDIC SURGERY

## 2018-09-14 NOTE — PROGRESS NOTES
SUBJECTIVE:  Zaid Pina is a [de-identified]y o  year old female who presents for follow up after surgery for removal of right ankle fibula hardware done on  8/2/2018 by Dr Gabi Segura  Today patient has no pain  She completed IV antibiotics through her PICC line about a week ago and has been released from ID  The patient has been weight bearing as tolerated  Denies numbness/tingling  Cultures in the hospital grew 3+ Staph aureus  She is here today for a wound check and to remove the last few sutures  She denies any signs of infection  No discharge  No erythema  No fever or chills  She uses a cane for ambulation  The patient has some intermittent right knee pain  The patient does not want an xray of the ankle at this time  VITALS:  Vitals:    09/14/18 0842   BP: 118/72   Pulse: 92       PHYSICAL EXAMINATION:  General: well developed and well nourished, alert, oriented times 3 and appears comfortable  Psychiatric: Normal    MUSCULOSKELETAL EXAMINATION:  Right ankle  Incision: healed  No signs of infection  No drainage  No erythema  Remaining sutures removed  2 small scabs present  Range of Motion: Good ankle dorsiflexion and plantar flexion  Neurovascular status: Neuro intact, good cap refill  Sensation intact in L1-S1 distribution  Right knee: Positive varus deformity  Patient uses a cane for ambulation and demonstrated ambulating in the office today without issue  STUDIES REVIEWED:  No studies reviewed  PROCEDURES PERFORMED:  Procedures  No Procedures performed today      ASSESSMENT/PLAN:    S/P removal hardware from right ankle due to infection treated with IV antibiotics  * Remaining sutures removed  * The patient has no ankle pain or signs of infection  She has been doing well WBAT on the right leg  She should f/u with our office if she develops ANY ankle pain or has ANY signs of infection  Right knee varus deformity  * Patient has 2 knee braces at home    She uses a cane for ambulation  The patient is not interested in knee surgery at this time for a knee replacement  Continue using the cane and knee braces for support  Topical heat may be helpful  FOLLOW UP:  Return if symptoms worsen or fail to improve        TO DO AT NEXT VISIT:  Re-evaluation of current issue and X-rays of the  right  ankle (if the patient follows up)

## 2018-09-20 ENCOUNTER — TELEPHONE (OUTPATIENT)
Dept: OBGYN CLINIC | Facility: HOSPITAL | Age: 83
End: 2018-09-20

## 2018-09-20 NOTE — TELEPHONE ENCOUNTER
Lazara Speak  [phone]    Dr Liat Dukes    Patient called stating she has small drainage from incision she wishes to discuss

## 2018-09-20 NOTE — TELEPHONE ENCOUNTER
Spoke to pt  Who stated she needed to come in and get her right ankle checked  Pt  Stated she is having a small amount of drainage the size of a pin head, and insist on seeing Dr Mercy Whitley tomorrow   I spoke with Cecile Dao who stated Dr Mercy Whitley will see her at 9 am on 09/21/2018  Pt  Was notified and accepted appt

## 2018-09-21 ENCOUNTER — OFFICE VISIT (OUTPATIENT)
Dept: OBGYN CLINIC | Facility: CLINIC | Age: 83
End: 2018-09-21

## 2018-09-21 VITALS
SYSTOLIC BLOOD PRESSURE: 115 MMHG | HEART RATE: 83 BPM | HEIGHT: 64 IN | WEIGHT: 172 LBS | BODY MASS INDEX: 29.37 KG/M2 | DIASTOLIC BLOOD PRESSURE: 78 MMHG

## 2018-09-21 DIAGNOSIS — M25.571 PAIN, JOINT, ANKLE AND FOOT, RIGHT: Primary | ICD-10-CM

## 2018-09-21 DIAGNOSIS — T84.7XXD INFECTED HARDWARE IN RIGHT LOWER EXTREMITY, SUBSEQUENT ENCOUNTER: ICD-10-CM

## 2018-09-21 PROCEDURE — 99024 POSTOP FOLLOW-UP VISIT: CPT | Performed by: ORTHOPAEDIC SURGERY

## 2018-09-21 RX ORDER — SULFAMETHOXAZOLE AND TRIMETHOPRIM 800; 160 MG/1; MG/1
1 TABLET ORAL EVERY 12 HOURS SCHEDULED
Qty: 14 TABLET | Refills: 0 | Status: SHIPPED | OUTPATIENT
Start: 2018-09-21 | End: 2018-09-28

## 2018-09-21 NOTE — PROGRESS NOTES
CHIEF COMPLAINT:   Chief Complaint   Patient presents with    Right Ankle - Swelling, Post-op         PROCEDURE: S/P Right ankle removal fibula hardware and I and D 8/2/2018      SUBJECTIVE:   Patient enters today for wound check  Patient has no complaints of pain  She tells me she noticed a small scab fell off and noticed slight drainage from a pin point area mid incision lateral aspect the right ankle  She had slight drainage on the bandage that she had placed  This morning she states bandage was dry  She denies any fevers or pain in the ankle  Patient had PICC line up into about couple weeks ago    ROS:   General: no fever, no chills  Respiratory:  No coughing, shortness of breath or wheezing  Cardiovascular:  No chest pain, no palpitations  Neurological:  No headaches, no confusion  Review of all other systems is negative    MEDS:   Current Outpatient Prescriptions   Medication Sig Dispense Refill    acetaminophen (TYLENOL) 325 mg tablet Take 325 mg by mouth every 6 (six) hours as needed for mild pain      amLODIPine (NORVASC) 5 mg tablet Take 5 mg by mouth      aspirin 81 MG tablet Take by mouth      atorvastatin (LIPITOR) 10 mg tablet Take 10 mg by mouth      benazepril (LOTENSIN) 20 mg tablet Take 20 mg by mouth daily      furosemide (LASIX) 20 mg tablet Take 20 mg by mouth daily      levothyroxine 88 mcg tablet Take 88 mcg by mouth      metoprolol succinate (TOPROL-XL) 100 mg 24 hr tablet Take 100 mg by mouth      Multiple Vitamins-Minerals (CERTAVITE SENIOR/ANTIOXIDANT PO) Take by mouth      warfarin (COUMADIN) 1 mg tablet Take 1 mg by mouth daily      warfarin (COUMADIN) 2 mg tablet Take by mouth daily       No current facility-administered medications for this visit  ALLERGIES: Patient has no known allergies        Vitals:    09/21/18 0900   BP: 115/78   Pulse: 83   Weight: 78 kg (172 lb)   Height: 5' 4" (1 626 m)       PHYSICAL EXAM:    General Appearance:  Alert, cooperative, no distress, appears stated age   Lungs:   respirations unlabored   Chest Wall:  No tenderness or deformity   Heart:  Normal Heart rate noted   Extremities: Extremities normal, atraumatic, no cyanosis or edema   Pulses: 2+ and symmetric   Neurologic: Normal     ORTHO EXAM:  Examination right ankle shows well healed incision on medial aspect ankle  Lateral aspect well-healed incision with a small pinpoint area mid incision with slight drainage with palpation  No erythema  Patient has full pain-free range of motion  Sensation intact to light touch L1-S1 distributions  IMAGING: X-rays 8/31/2018 right ankle shows orthopedic hardware noted medial aspect in acceptable position with no evidence of loosening or fracture  ASSESSMENT/PLAN:   There are no diagnoses linked to this encounter  There are no Patient Instructions on file for this visit  DISCUSSION SUMMARY:  Patient is s/p removal hardware lateral ankle and incision and drainage  Patient with small slight area of drainage lateral aspect  No erythema noted  No fever  We will place patient on Bactrim prophylactically  She will keep area clean and dry, instructed not to put any lotions or creams over incision  She will keep it covered while at work and home  She will follow up in 10 days for reexamination

## 2018-09-28 ENCOUNTER — OFFICE VISIT (OUTPATIENT)
Dept: OBGYN CLINIC | Facility: CLINIC | Age: 83
End: 2018-09-28

## 2018-09-28 VITALS
WEIGHT: 172 LBS | BODY MASS INDEX: 29.37 KG/M2 | SYSTOLIC BLOOD PRESSURE: 123 MMHG | HEIGHT: 64 IN | DIASTOLIC BLOOD PRESSURE: 66 MMHG | HEART RATE: 89 BPM

## 2018-09-28 DIAGNOSIS — Z51.89 VISIT FOR WOUND CHECK: Primary | ICD-10-CM

## 2018-09-28 PROCEDURE — 99024 POSTOP FOLLOW-UP VISIT: CPT | Performed by: PHYSICIAN ASSISTANT

## 2018-09-28 NOTE — PROGRESS NOTES
CHIEF COMPLAINT:   Chief Complaint   Patient presents with    Right Ankle - Follow-up         PROCEDURE: S/P Right ankle removal fibula hardware and I and D 8/2/2018      SUBJECTIVE:   Patient enters today for wound check  Patient has completed her antibiotics given to her last visit  She sees no more drainage from the incision  States the wound has been dry since last visit  Denies any numbness or tingling  ROS:   General: no fever, no chills  Respiratory:  No coughing, shortness of breath or wheezing  Cardiovascular:  No chest pain, no palpitations  Neurological:  No headaches, no confusion  Review of all other systems is negative    MEDS:   Current Outpatient Prescriptions   Medication Sig Dispense Refill    acetaminophen (TYLENOL) 325 mg tablet Take 325 mg by mouth every 6 (six) hours as needed for mild pain      amLODIPine (NORVASC) 5 mg tablet Take 5 mg by mouth      aspirin 81 MG tablet Take by mouth      atorvastatin (LIPITOR) 10 mg tablet Take 10 mg by mouth      benazepril (LOTENSIN) 20 mg tablet Take 20 mg by mouth daily      furosemide (LASIX) 20 mg tablet Take 20 mg by mouth daily      levothyroxine 88 mcg tablet Take 88 mcg by mouth      metoprolol succinate (TOPROL-XL) 100 mg 24 hr tablet Take 100 mg by mouth      Multiple Vitamins-Minerals (CERTAVITE SENIOR/ANTIOXIDANT PO) Take by mouth      warfarin (COUMADIN) 1 mg tablet Take 1 mg by mouth daily      warfarin (COUMADIN) 2 mg tablet Take by mouth daily       No current facility-administered medications for this visit  ALLERGIES: Patient has no known allergies        Vitals:    09/28/18 0952   BP: 123/66   Pulse: 89   Weight: 78 kg (172 lb)   Height: 5' 4" (1 626 m)       PHYSICAL EXAM:    General Appearance:  Alert, cooperative, no distress, appears stated age   Lungs:   respirations unlabored   Chest Wall:  No tenderness or deformity   Heart:  Normal Heart rate noted   Extremities: Extremities normal, atraumatic, no cyanosis or edema   Pulses: 2+ and symmetric   Neurologic: Normal     ORTHO EXAM:  Right ankle exam shows well-healed incision on the medial and lateral aspects of the ankle  There is no erythema noted  No active drainage noted from the lateral incision  Patient has full pain-free range of motion noted of the right ankle  Sensation is intact light touch L1-S1 distributions  ASSESSMENT/PLAN:   [facility] was seen today for follow-up  Diagnoses and all orders for this visit:    Visit for wound check        There are no Patient Instructions on file for this visit  DISCUSSION SUMMARY:  Patient is s/p removal hardware lateral ankle and incision and drainage  Patient has completed her antibiotics  No active drainage from the wound  Patient is weight-bearing as tolerated and will continue with her activities with no restrictions  Will follow up one last time in four weeks

## 2018-10-26 ENCOUNTER — OFFICE VISIT (OUTPATIENT)
Dept: OBGYN CLINIC | Facility: CLINIC | Age: 83
End: 2018-10-26

## 2018-10-26 VITALS
HEART RATE: 109 BPM | BODY MASS INDEX: 29.37 KG/M2 | DIASTOLIC BLOOD PRESSURE: 89 MMHG | SYSTOLIC BLOOD PRESSURE: 149 MMHG | WEIGHT: 172 LBS | HEIGHT: 64 IN

## 2018-10-26 DIAGNOSIS — T84.7XXD INFECTED HARDWARE IN RIGHT LOWER EXTREMITY, SUBSEQUENT ENCOUNTER: Primary | ICD-10-CM

## 2018-10-26 DIAGNOSIS — Z87.81 STATUS POST ORIF OF FRACTURE OF ANKLE: ICD-10-CM

## 2018-10-26 DIAGNOSIS — Z98.890 STATUS POST INCISION AND DRAINAGE: ICD-10-CM

## 2018-10-26 DIAGNOSIS — Z98.890 STATUS POST ORIF OF FRACTURE OF ANKLE: ICD-10-CM

## 2018-10-26 PROCEDURE — 99024 POSTOP FOLLOW-UP VISIT: CPT | Performed by: ORTHOPAEDIC SURGERY

## 2018-10-26 NOTE — PROGRESS NOTES
CHIEF COMPLAINT:   Chief Complaint   Patient presents with    Right Ankle - Follow-up         PROCEDURE: S/P Right ankle removal fibula hardware and I and D 8/2/2018  Original ORIF right ankle was March 12, 2018      SUBJECTIVE:   Patient here for follow-up right ankle  Patient has no complaints today  Drainage has completely resolved  Patient has been off antibiotics now since last visit  Denies any numbness or tingling lower extremity    ROS:   General: no fever, no chills  Respiratory:  No coughing, shortness of breath or wheezing  Cardiovascular:  No chest pain, no palpitations  Neurological:  No headaches, no confusion  Review of all other systems is negative    MEDS:   Current Outpatient Prescriptions   Medication Sig Dispense Refill    acetaminophen (TYLENOL) 325 mg tablet Take 325 mg by mouth every 6 (six) hours as needed for mild pain      amLODIPine (NORVASC) 5 mg tablet Take 5 mg by mouth      aspirin 81 MG tablet Take by mouth      atorvastatin (LIPITOR) 10 mg tablet Take 10 mg by mouth      benazepril (LOTENSIN) 20 mg tablet Take 20 mg by mouth daily      furosemide (LASIX) 20 mg tablet Take 20 mg by mouth daily      levothyroxine 88 mcg tablet Take 88 mcg by mouth      metoprolol succinate (TOPROL-XL) 100 mg 24 hr tablet Take 100 mg by mouth      Multiple Vitamins-Minerals (CERTAVITE SENIOR/ANTIOXIDANT PO) Take by mouth      warfarin (COUMADIN) 1 mg tablet Take 1 mg by mouth daily      warfarin (COUMADIN) 2 mg tablet Take by mouth daily       No current facility-administered medications for this visit  ALLERGIES: Patient has no known allergies        Vitals:    10/26/18 0848   BP: 149/89   Pulse: (!) 109   Weight: 78 kg (172 lb)   Height: 5' 4" (1 626 m)       PHYSICAL EXAM:    General Appearance:  Alert, cooperative, no distress, appears stated age   Lungs:   respirations unlabored   Chest Wall:  No tenderness or deformity   Heart:  Normal Heart rate noted   Extremities: Extremities normal, atraumatic, no cyanosis or edema   Pulses: 2+ and symmetric   Neurologic: Normal     ORTHO EXAM:  Examination of the right ankle shows well-healed incisions in the medial and lateral aspects of the ankle  There is no active drainage noted today  No erythema or ecchymosis noted around incisions  Pain-free active range of motion noted of the ankle  Sensation is intact light touch L1-S1 distributions      ASSESSMENT/PLAN:   There are no diagnoses linked to this encounter  There are no Patient Instructions on file for this visit  DISCUSSION SUMMARY:  Patient is s/p 3 months  removal hardware lateral ankle and incision and drainage  Patient is currently asymptomatic  Has no complaints of pain and able to do all activities with no restrictions    She will continue her home exercise program   Will follow up with us only as needed

## 2022-10-11 NOTE — DISCHARGE SUMMARY
Discharge Summary - Orthopedics   Elspeth Stage [de-identified] y o  female MRN: [id number]  Unit/Bed#: -01    Attending Physician: DR Desiree Wade    Admitting diagnosis:  Infected hardware right ankle    Discharge diagnosis:  Infected hardware right ankle    Date of admission: 8/2/2018    Date of discharge: 08/10/18    Procedure:  S/p removal infected hardware right distal fibula and incision and drainage    HPI: [de-identified] y o  female with the regional open reduction internal fixation right ankle on March 12, 2018  Patient had drainage from the a lateral aspect of the ankle which became worse over time  Patient was noted to have an infected hardware distal fibula right ankle  Treatment options were discussed and patient agreed to have elective removal of hardware and incision and drainage  Hospital course: Pt was taken to the OR on 8/2/2018  Surgery went without complications and pt was discharged to the PACU in a stable condition and was transferred to the floor  The hardware and tissue samples were sent for culture which ended up growing 3+ Staphylococcus aureus  Infectious Disease was consulted who suspected osteomyelitis and recommended IV antibiotics long term, for four weeks  It was recommended to patient be on IV cefazolin  Interventional Radiology was then consulted for PICC line placement so patient could receive IV antibiotics at home  Patient was then taught how to properly administer medication at home  Internal Medicine was also consulted for supratherapeutic INR which was ventrally resolved  On discharge date pt was cleared by PT and the medicine team and determined to be safe for discharge  Daily discussion was had with the patient, nursing staff, orthopaedic team, and family members if present  All questions were answered to the patients satisifaction          0  Lab Value Date/Time   HGB 12 3 08/06/2018 0944   HGB 13 6 07/30/2018 1719   HGB 10 3 (L) 03/26/2018 0501   HGB 9 1 (L) 03/19/2018 0618   HGB 10 3 (L) 03/15/2018 0551   HGB 10 3 (L) 03/14/2018 0522   HGB 12 0 03/09/2018 0539   HGB 13 1 03/08/2018 1006       Patient did not have greater than 2 gram decrease in Hb qualifies for diagnosis of acute blood loss anemia  Vital signs remained stable and pt was resuscitated with IVF as needed  Discharge Instructions:   · See discharge instructions for complete list  · Keep dressings clean and dry at all times   Discharge Medications: For the complete list of discharge medications, please refer to the patient's medication reconciliation  Xenograft Text: The defect edges were debeveled with a #15 scalpel blade.  Given the location of the defect, shape of the defect and the proximity to free margins a xenograft was deemed most appropriate.  The graft was then trimmed to fit the size of the defect.  The graft was then placed in the primary defect and oriented appropriately.

## (undated) DEVICE — TUBING SUCTION 5MM X 12 FT

## (undated) DEVICE — DRAPE C-ARM X-RAY

## (undated) DEVICE — BETHLEHEM UNIVERSAL OUTPATIENT: Brand: CARDINAL HEALTH

## (undated) DEVICE — CHLORAPREP HI-LITE 26ML ORANGE

## (undated) DEVICE — KERLIX BANDAGE ROLL: Brand: KERLIX

## (undated) DEVICE — OCCLUSIVE GAUZE STRIP,3% BISMUTH TRIBROMOPHENATE IN PETROLATUM BLEND: Brand: XEROFORM

## (undated) DEVICE — ACE WRAP 4 IN UNSTERILE

## (undated) DEVICE — DRAPE C-ARMOUR

## (undated) DEVICE — SCD SEQUENTIAL COMPRESSION COMFORT SLEEVE MEDIUM KNEE LENGTH: Brand: KENDALL SCD

## (undated) DEVICE — GLOVE INDICATOR PI UNDERGLOVE SZ 9 BLUE

## (undated) DEVICE — 2.5MM DRILL BIT/QC/GOLD/110MM

## (undated) DEVICE — GLOVE SRG BIOGEL 9

## (undated) DEVICE — 3M™ STERI-STRIP™ REINFORCED ADHESIVE SKIN CLOSURES, R1547, 1/2 IN X 4 IN (12 MM X 100 MM), 6 STRIPS/ENVELOPE: Brand: 3M™ STERI-STRIP™

## (undated) DEVICE — SUT VICRYL 0 CT-1 27 IN J260H

## (undated) DEVICE — ABDOMINAL PAD: Brand: DERMACEA

## (undated) DEVICE — DRAPE EQUIPMENT RF WAND

## (undated) DEVICE — TIBURON SPLIT SHEET: Brand: CONVERTORS

## (undated) DEVICE — REM POLYHESIVE ADULT PATIENT RETURN ELECTRODE: Brand: VALLEYLAB

## (undated) DEVICE — SUT ETHILON 3-0 PS-1 18 IN 1663H

## (undated) DEVICE — INTENDED FOR TISSUE SEPARATION, AND OTHER PROCEDURES THAT REQUIRE A SHARP SURGICAL BLADE TO PUNCTURE OR CUT.: Brand: BARD-PARKER SAFETY BLADES SIZE 10, STERILE

## (undated) DEVICE — IMPERVIOUS STOCKINETTE: Brand: DEROYAL

## (undated) DEVICE — INTENDED FOR TISSUE SEPARATION, AND OTHER PROCEDURES THAT REQUIRE A SHARP SURGICAL BLADE TO PUNCTURE OR CUT.: Brand: BARD-PARKER SAFETY BLADES SIZE 15, STERILE

## (undated) DEVICE — ASTOUND SURGICAL GOWN, XXX LARGE, X-LONG: Brand: CONVERTORS

## (undated) DEVICE — 3M™ STERI-DRAPE™ U-DRAPE 1015: Brand: STERI-DRAPE™

## (undated) DEVICE — 2.7MM CANNULATED DRILL BIT/QC 160MM

## (undated) DEVICE — COBAN 4 IN STERILE

## (undated) DEVICE — ALL PURPOSE SPONGES,NONWOVEN, 4 PLY: Brand: CURITY

## (undated) DEVICE — SUT VICRYL 2-0 CT-1 27 IN J259H

## (undated) DEVICE — ACE WRAP 4 IN STERILE

## (undated) DEVICE — MEDI-VAC YANKAUER SUCTION HANDLE W/STRAIGHT TIP & CONTROL VENT: Brand: CARDINAL HEALTH

## (undated) DEVICE — LIGHT HANDLE COVER SLEEVE DISP BLUE STELLAR

## (undated) DEVICE — SUT ETHILON 3-0 PS-1 18 IN 1663G

## (undated) DEVICE — ACE WRAP 6 IN UNSTERILE

## (undated) DEVICE — SUPER SPONGES,MEDIUM: Brand: DERMACEA

## (undated) DEVICE — PADDING CAST 4 IN  COTTON STRL

## (undated) DEVICE — UNIVERSAL  MINOR EXTREMITY PK: Brand: CARDINAL HEALTH

## (undated) DEVICE — 2.8MM DRILL BIT/QC/165MM

## (undated) DEVICE — U-DRAPE: Brand: CONVERTORS

## (undated) DEVICE — SUT VICRYL 1 CT-1 27 IN J261H

## (undated) DEVICE — WEBRIL 4IN X 4YDS